# Patient Record
Sex: FEMALE | Race: WHITE | Employment: FULL TIME | URBAN - METROPOLITAN AREA
[De-identification: names, ages, dates, MRNs, and addresses within clinical notes are randomized per-mention and may not be internally consistent; named-entity substitution may affect disease eponyms.]

---

## 2019-03-27 ENCOUNTER — HOSPITAL ENCOUNTER (EMERGENCY)
Age: 54
Discharge: HOME | End: 2019-03-27
Attending: EMERGENCY MEDICINE
Payer: COMMERCIAL

## 2019-03-27 VITALS
OXYGEN SATURATION: 98 % | SYSTOLIC BLOOD PRESSURE: 130 MMHG | TEMPERATURE: 98 F | DIASTOLIC BLOOD PRESSURE: 84 MMHG | RESPIRATION RATE: 16 BRPM | HEART RATE: 92 BPM

## 2019-03-27 DIAGNOSIS — T15.92XA ACUTE FOREIGN BODY OF LEFT EYE, INITIAL ENCOUNTER: Primary | ICD-10-CM

## 2019-03-27 PROCEDURE — 6370000000 HC RX 637 (ALT 250 FOR IP)

## 2019-03-27 PROCEDURE — 99283 EMERGENCY DEPT VISIT LOW MDM: CPT

## 2019-03-27 PROCEDURE — 65205 REMOVE FOREIGN BODY FROM EYE: CPT

## 2019-03-27 RX ORDER — TETRACAINE HYDROCHLORIDE 5 MG/ML
SOLUTION OPHTHALMIC
Status: COMPLETED
Start: 2019-03-27 | End: 2019-03-27

## 2019-03-27 RX ADMIN — FLUORESCEIN SODIUM 1 MG: 1 STRIP OPHTHALMIC at 10:46

## 2019-03-27 RX ADMIN — TETRACAINE HYDROCHLORIDE 2 DROP: 5 SOLUTION OPHTHALMIC at 10:47

## 2021-04-08 ENCOUNTER — HOSPITAL ENCOUNTER (EMERGENCY)
Age: 56
Discharge: HOME | End: 2021-04-08
Payer: COMMERCIAL

## 2021-04-08 VITALS
DIASTOLIC BLOOD PRESSURE: 78 MMHG | SYSTOLIC BLOOD PRESSURE: 133 MMHG | TEMPERATURE: 98.4 F | RESPIRATION RATE: 20 BRPM | HEIGHT: 63 IN | BODY MASS INDEX: 35.44 KG/M2 | OXYGEN SATURATION: 95 % | HEART RATE: 88 BPM | WEIGHT: 200 LBS

## 2021-04-08 DIAGNOSIS — L25.9 CONTACT DERMATITIS, UNSPECIFIED CONTACT DERMATITIS TYPE, UNSPECIFIED TRIGGER: ICD-10-CM

## 2021-04-08 DIAGNOSIS — R21 RASH: Primary | ICD-10-CM

## 2021-04-08 PROCEDURE — 99212 OFFICE O/P EST SF 10 MIN: CPT

## 2021-04-08 RX ORDER — PREDNISONE 10 MG/1
TABLET ORAL
Qty: 20 TABLET | Refills: 0 | Status: SHIPPED | OUTPATIENT
Start: 2021-04-08 | End: 2021-12-06

## 2021-04-08 RX ORDER — CETIRIZINE HYDROCHLORIDE 10 MG/1
10 TABLET ORAL DAILY PRN
Qty: 12 TABLET | Refills: 0 | Status: SHIPPED | OUTPATIENT
Start: 2021-04-08

## 2021-04-08 NOTE — ED PROVIDER NOTES
[address] [facility]  Department of Emergency Medicine   Encounter Note  21   1:06 PM EDT      NAME: Debra Mcduffie  :  1965  MRN:  [id number]    Chief Complaint: Rash (started  2 days ago red rash on right leg neck  back   went to  yesterday  was given shot  zyrtec and cream  not any better)      This is a 57-year-old female the presents to urgent care complaining of a red blotchy itchy type rash to the right leg and upper extremities and also on her trunk as well. She states this started 2 days ago. But denies any difficulty breathing or swallowing. She was given a steroid injection yesterday at another urgent care. She did take a Benadryl last night however she states the symptoms have continued and has been using steroid cream that was given to her as well. On first contact patient she appears to be in no acute distress. Review of Systems  Pertinent positives and negatives are stated within HPI, all other systems reviewed and are negative. Physical Exam  Vitals signs and nursing note reviewed. Constitutional:       Appearance: She is well-developed. HENT:      Head: Normocephalic and atraumatic. Right Ear: Hearing and external ear normal.      Left Ear: Hearing and external ear normal.      Nose: Nose normal.      Mouth/Throat:      Pharynx: Uvula midline. Eyes:      General: Lids are normal.      Conjunctiva/sclera: Conjunctivae normal.      Pupils: Pupils are equal, round, and reactive to light. Neck:      Musculoskeletal: Normal range of motion and neck supple. Cardiovascular:      Rate and Rhythm: Normal rate and regular rhythm. Heart sounds: Normal heart sounds. No murmur. Pulmonary:      Effort: Pulmonary effort is normal.      Breath sounds: Normal breath sounds. Abdominal:      General: Bowel sounds are normal.      Palpations: Abdomen is soft. Abdomen is not rigid. Tenderness: There is no abdominal tenderness.  There is no guarding or rebound. Skin:     General: Skin is warm and dry. Findings: Rash present. No abrasion. Rash is macular. Rash is not purpuric or vesicular. Comments: Blanchable type rash to the arms and legs and trunk. Neurological:      Mental Status: She is alert and oriented to person, place, and time. GCS: GCS eye subscore is 4. GCS verbal subscore is 5. GCS motor subscore is 6. Cranial Nerves: No cranial nerve deficit. Sensory: No sensory deficit. Coordination: Coordination normal.      Gait: Gait normal.         Procedures    MDM  Number of Diagnoses or Management Options  Contact dermatitis, unspecified contact dermatitis type, unspecified trigger  Rash  Diagnosis management comments: Placed on an oral steroid have and given Zyrtec so she can take that during the day. Continue the steroid cream or use calamine lotion to help with the itching. Return if symptoms worsen. --------------------------------------------- PAST HISTORY ---------------------------------------------  Past Medical History:  has a past medical history of Arthritis, Asthma, and Thyroid disease. Past Surgical History:  has a past surgical history that includes  section and bladder suspension. Social History:  reports that she has been smoking. She has never used smokeless tobacco. She reports that she does not drink alcohol or use drugs. Family History: family history is not on file. The patients home medications have been reviewed. Allergies: Eggs [egg white]    -------------------------------------------------- RESULTS -------------------------------------------------  No results found for this visit on 21. No orders to display       ------------------------- NURSING NOTES AND VITALS REVIEWED ---------------------------   The nursing notes within the ED encounter and vital signs as below have been reviewed.    /78   Pulse 88   Temp 98.4 °F (36.9 °C)

## 2021-12-06 ENCOUNTER — HOSPITAL ENCOUNTER (EMERGENCY)
Age: 56
Discharge: HOME | End: 2021-12-06
Payer: COMMERCIAL

## 2021-12-06 ENCOUNTER — APPOINTMENT (OUTPATIENT)
Dept: GENERAL RADIOLOGY | Age: 56
End: 2021-12-06
Payer: COMMERCIAL

## 2021-12-06 VITALS
DIASTOLIC BLOOD PRESSURE: 85 MMHG | TEMPERATURE: 97.5 F | HEIGHT: 63 IN | HEART RATE: 86 BPM | OXYGEN SATURATION: 96 % | WEIGHT: 200 LBS | BODY MASS INDEX: 35.44 KG/M2 | SYSTOLIC BLOOD PRESSURE: 145 MMHG | RESPIRATION RATE: 20 BRPM

## 2021-12-06 DIAGNOSIS — J06.9 ACUTE UPPER RESPIRATORY INFECTION: ICD-10-CM

## 2021-12-06 DIAGNOSIS — J20.9 ACUTE BRONCHITIS, UNSPECIFIED ORGANISM: Primary | ICD-10-CM

## 2021-12-06 PROCEDURE — 71046 X-RAY EXAM CHEST 2 VIEWS: CPT

## 2021-12-06 PROCEDURE — 99211 OFF/OP EST MAY X REQ PHY/QHP: CPT

## 2021-12-06 RX ORDER — BROMPHENIRAMINE MALEATE, PSEUDOEPHEDRINE HYDROCHLORIDE, AND DEXTROMETHORPHAN HYDROBROMIDE 2; 30; 10 MG/5ML; MG/5ML; MG/5ML
10 SYRUP ORAL 3 TIMES DAILY PRN
Qty: 120 ML | Refills: 0 | Status: SHIPPED | OUTPATIENT
Start: 2021-12-06

## 2021-12-06 RX ORDER — PREDNISONE 20 MG/1
TABLET ORAL
Qty: 8 TABLET | Refills: 0 | Status: SHIPPED | OUTPATIENT
Start: 2021-12-06

## 2021-12-06 RX ORDER — ALBUTEROL SULFATE 90 UG/1
2 AEROSOL, METERED RESPIRATORY (INHALATION) 4 TIMES DAILY PRN
Qty: 1 EACH | Refills: 0 | Status: SHIPPED | OUTPATIENT
Start: 2021-12-06

## 2021-12-06 NOTE — ED PROVIDER NOTES
[address] [facility]  Department of Emergency Medicine  UC Encounter Note  21   2:46 PM EST      NAME: Radha Sexton  :  1965  MRN:  [id number]    Chief Complaint: Sinusitis (started  week ago with sinus pressure  chest congestion  went to dr  was on  z pac) and Chest Congestion      This is a 57-year-old female the presents to urgent care complaining of chest congestion and a cough and some sinus pain and pressure and congestion and runny nose for the past week. She states about 6 days ago she was placed on Zithromax and she had taken the 5-day course of the antibiotic and felt a little bit better but still has symptoms. She has not take any other medications to help with the symptoms specifically. On first contact patient she is in no acute distress. Patient is a smoker. Review of Systems  Pertinent positives and negatives are stated within HPI, all other systems reviewed and are negative. Physical Exam  Vitals and nursing note reviewed. Constitutional:       Appearance: She is well-developed. HENT:      Head: Normocephalic and atraumatic. Jaw: There is normal jaw occlusion. Right Ear: Hearing, ear canal and external ear normal. Tympanic membrane is bulging. Left Ear: Hearing, ear canal and external ear normal. Tympanic membrane is bulging. Nose: Congestion and rhinorrhea present. Right Sinus: Frontal sinus tenderness present. Left Sinus: Frontal sinus tenderness present. Mouth/Throat:      Lips: Pink. Mouth: Mucous membranes are moist. No angioedema. Pharynx: Oropharynx is clear. Uvula midline. Comments: Oropharynx is patent. Eyes:      General: Lids are normal.      Conjunctiva/sclera: Conjunctivae normal.      Pupils: Pupils are equal, round, and reactive to light. Cardiovascular:      Rate and Rhythm: Normal rate and regular rhythm. Heart sounds: Normal heart sounds.  No murmur heard.      Pulmonary:      Effort: Pulmonary effort is normal. No respiratory distress. Breath sounds: Normal breath sounds. No wheezing or rales. Abdominal:      General: Bowel sounds are normal.      Palpations: Abdomen is soft. Abdomen is not rigid. Tenderness: There is no abdominal tenderness. There is no guarding or rebound. Musculoskeletal:      Cervical back: Normal range of motion and neck supple. Skin:     General: Skin is warm and dry. Findings: No abrasion or rash. Neurological:      General: No focal deficit present. Mental Status: She is alert and oriented to person, place, and time. GCS: GCS eye subscore is 4. GCS verbal subscore is 5. GCS motor subscore is 6. Cranial Nerves: No cranial nerve deficit. Sensory: No sensory deficit. Coordination: Coordination normal.      Gait: Gait normal.         Procedures    MDM  Number of Diagnoses or Management Options  Acute bronchitis, unspecified organism  Acute upper respiratory infection  Diagnosis management comments: Patient is in no acute distress here. X-ray was reviewed. Patient had just recently finished Zithromax. Recommend she be placed on medications to help with her symptoms. Note for work given.           --------------------------------------------- PAST HISTORY ---------------------------------------------  Past Medical History:  has a past medical history of Arthritis, Asthma, and Thyroid disease. Past Surgical History:  has a past surgical history that includes  section and bladder suspension. Social History:  reports that she has been smoking. She has never used smokeless tobacco. She reports that she does not drink alcohol and does not use drugs. Family History: family history is not on file. The patients home medications have been reviewed.     Allergies: Eggs [egg white]    -------------------------------------------------- RESULTS -------------------------------------------------  No results found for this visit on 12/06/21. XR CHEST (2 VW)   Final Result   No acute process. ------------------------- NURSING NOTES AND VITALS REVIEWED ---------------------------   The nursing notes within the ED encounter and vital signs as below have been reviewed. BP (!) 145/85   Pulse 86   Temp 97.5 °F (36.4 °C) (Infrared)   Resp 20   Ht 5' 3\" (1.6 m)   Wt 200 lb (90.7 kg)   SpO2 96%   BMI 35.43 kg/m²   Oxygen Saturation Interpretation: Normal      ------------------------------------------ PROGRESS NOTES ------------------------------------------   I have spoken with the patient and discussed todays results, in addition to providing specific details for the plan of care and counseling regarding the diagnosis and prognosis. Their questions are answered at this time and they are agreeable with the plan.      --------------------------------- ADDITIONAL PROVIDER NOTES ---------------------------------     This patient is stable for discharge. I have shared the specific conditions for return, as well as the importance of follow-up. * NOTE: This report was transcribed using voice recognition software. Every effort was made to ensure accuracy; however, inadvertent computerized transcription errors may be present.    --------------------------------- IMPRESSION AND DISPOSITION ---------------------------------    IMPRESSION  1. Acute bronchitis, unspecified organism    2.  Acute upper respiratory infection        DISPOSITION  Disposition: Discharge to home  Patient condition is good       Cassandra Raymond, PA-C  12/06/21 8874

## 2023-10-24 ENCOUNTER — APPOINTMENT (OUTPATIENT)
Dept: CT IMAGING | Age: 58
End: 2023-10-24
Payer: COMMERCIAL

## 2023-10-24 ENCOUNTER — HOSPITAL ENCOUNTER (EMERGENCY)
Age: 58
Discharge: HOME | End: 2023-10-24
Attending: EMERGENCY MEDICINE
Payer: COMMERCIAL

## 2023-10-24 VITALS
TEMPERATURE: 97.9 F | WEIGHT: 227 LBS | HEART RATE: 92 BPM | DIASTOLIC BLOOD PRESSURE: 97 MMHG | BODY MASS INDEX: 40.22 KG/M2 | SYSTOLIC BLOOD PRESSURE: 154 MMHG | HEIGHT: 63 IN | RESPIRATION RATE: 14 BRPM | OXYGEN SATURATION: 95 %

## 2023-10-24 DIAGNOSIS — R40.4 TRANSIENT ALTERATION OF AWARENESS: Primary | ICD-10-CM

## 2023-10-24 DIAGNOSIS — R73.9 HYPERGLYCEMIA: ICD-10-CM

## 2023-10-24 DIAGNOSIS — R42 DIZZINESS: ICD-10-CM

## 2023-10-24 LAB
ALBUMIN SERPL-MCNC: 4.1 G/DL (ref 3.5–5.2)
ALP SERPL-CCNC: 107 U/L (ref 35–104)
ALT SERPL-CCNC: 31 U/L (ref 0–32)
AMPHET UR QL SCN: POSITIVE
ANION GAP SERPL CALCULATED.3IONS-SCNC: 9 MMOL/L (ref 7–16)
APAP SERPL-MCNC: <5 UG/ML (ref 10–30)
AST SERPL-CCNC: 17 U/L (ref 0–31)
BARBITURATES UR QL SCN: NEGATIVE
BASOPHILS # BLD: 0.07 K/UL (ref 0–0.2)
BASOPHILS NFR BLD: 1 % (ref 0–2)
BENZODIAZ UR QL: NEGATIVE
BILIRUB DIRECT SERPL-MCNC: <0.2 MG/DL (ref 0–0.3)
BILIRUB INDIRECT SERPL-MCNC: ABNORMAL MG/DL (ref 0–1)
BILIRUB SERPL-MCNC: 0.3 MG/DL (ref 0–1.2)
BILIRUB UR QL STRIP: NEGATIVE
BUN SERPL-MCNC: 17 MG/DL (ref 6–20)
BUPRENORPHINE UR QL: NEGATIVE
CALCIUM SERPL-MCNC: 8.9 MG/DL (ref 8.6–10.2)
CANNABINOIDS UR QL SCN: NEGATIVE
CHLORIDE SERPL-SCNC: 100 MMOL/L (ref 98–107)
CLARITY UR: CLEAR
CO2 SERPL-SCNC: 24 MMOL/L (ref 22–29)
COCAINE UR QL SCN: NEGATIVE
COLOR UR: YELLOW
COMMENT: ABNORMAL
CREAT SERPL-MCNC: 0.8 MG/DL (ref 0.5–1)
EOSINOPHIL # BLD: 0.33 K/UL (ref 0.05–0.5)
EOSINOPHILS RELATIVE PERCENT: 3 % (ref 0–6)
ERYTHROCYTE [DISTWIDTH] IN BLOOD BY AUTOMATED COUNT: 15 % (ref 11.5–15)
ETHANOLAMINE SERPL-MCNC: <10 MG/DL
FENTANYL UR QL: NEGATIVE
GFR SERPL CREATININE-BSD FRML MDRD: >60 ML/MIN/1.73M2
GLUCOSE SERPL-MCNC: 327 MG/DL (ref 74–99)
GLUCOSE UR STRIP-MCNC: >=1000 MG/DL
HCT VFR BLD AUTO: 39.8 % (ref 34–48)
HGB BLD-MCNC: 12.7 G/DL (ref 11.5–15.5)
HGB UR QL STRIP.AUTO: NEGATIVE
IMM GRANULOCYTES # BLD AUTO: 0.04 K/UL (ref 0–0.58)
IMM GRANULOCYTES NFR BLD: 0 % (ref 0–5)
KETONES UR STRIP-MCNC: NEGATIVE MG/DL
LEUKOCYTE ESTERASE UR QL STRIP: NEGATIVE
LYMPHOCYTES NFR BLD: 3.59 K/UL (ref 1.5–4)
LYMPHOCYTES RELATIVE PERCENT: 32 % (ref 20–42)
MAGNESIUM SERPL-MCNC: 2.1 MG/DL (ref 1.6–2.6)
MCH RBC QN AUTO: 27.9 PG (ref 26–35)
MCHC RBC AUTO-ENTMCNC: 31.9 G/DL (ref 32–34.5)
MCV RBC AUTO: 87.3 FL (ref 80–99.9)
METHADONE UR QL: NEGATIVE
MONOCYTES NFR BLD: 0.61 K/UL (ref 0.1–0.95)
MONOCYTES NFR BLD: 6 % (ref 2–12)
NEUTROPHILS NFR BLD: 58 % (ref 43–80)
NEUTS SEG NFR BLD: 6.48 K/UL (ref 1.8–7.3)
NITRITE UR QL STRIP: NEGATIVE
OPIATES UR QL SCN: NEGATIVE
OXYCODONE UR QL SCN: NEGATIVE
PCP UR QL SCN: NEGATIVE
PH UR STRIP: 5.5 [PH] (ref 5–9)
PLATELET # BLD AUTO: 369 K/UL (ref 130–450)
PMV BLD AUTO: 9.1 FL (ref 7–12)
POTASSIUM SERPL-SCNC: 4.2 MMOL/L (ref 3.5–5)
PROT SERPL-MCNC: 6.3 G/DL (ref 6.4–8.3)
PROT UR STRIP-MCNC: NEGATIVE MG/DL
RBC # BLD AUTO: 4.56 M/UL (ref 3.5–5.5)
SALICYLATES SERPL-MCNC: <0.3 MG/DL (ref 0–30)
SODIUM SERPL-SCNC: 133 MMOL/L (ref 132–146)
SP GR UR STRIP: 1.01 (ref 1–1.03)
TEST INFORMATION: ABNORMAL
TOXIC TRICYCLIC SC,BLOOD: NEGATIVE
TROPONIN I SERPL HS-MCNC: 10 NG/L (ref 0–9)
TROPONIN I SERPL HS-MCNC: 10 NG/L (ref 0–9)
TSH SERPL DL<=0.05 MIU/L-ACNC: 16.41 UIU/ML (ref 0.27–4.2)
UROBILINOGEN UR STRIP-ACNC: 0.2 EU/DL (ref 0–1)
WBC OTHER # BLD: 11.1 K/UL (ref 4.5–11.5)

## 2023-10-24 PROCEDURE — 85025 COMPLETE CBC W/AUTO DIFF WBC: CPT

## 2023-10-24 PROCEDURE — 6360000004 HC RX CONTRAST MEDICATION: Performed by: RADIOLOGY

## 2023-10-24 PROCEDURE — 80179 DRUG ASSAY SALICYLATE: CPT

## 2023-10-24 PROCEDURE — 96360 HYDRATION IV INFUSION INIT: CPT

## 2023-10-24 PROCEDURE — 2580000003 HC RX 258: Performed by: EMERGENCY MEDICINE

## 2023-10-24 PROCEDURE — 80053 COMPREHEN METABOLIC PANEL: CPT

## 2023-10-24 PROCEDURE — 82248 BILIRUBIN DIRECT: CPT

## 2023-10-24 PROCEDURE — 81003 URINALYSIS AUTO W/O SCOPE: CPT

## 2023-10-24 PROCEDURE — 99285 EMERGENCY DEPT VISIT HI MDM: CPT

## 2023-10-24 PROCEDURE — 93005 ELECTROCARDIOGRAM TRACING: CPT | Performed by: EMERGENCY MEDICINE

## 2023-10-24 PROCEDURE — 83735 ASSAY OF MAGNESIUM: CPT

## 2023-10-24 PROCEDURE — 80143 DRUG ASSAY ACETAMINOPHEN: CPT

## 2023-10-24 PROCEDURE — G0480 DRUG TEST DEF 1-7 CLASSES: HCPCS

## 2023-10-24 PROCEDURE — 96361 HYDRATE IV INFUSION ADD-ON: CPT

## 2023-10-24 PROCEDURE — 70496 CT ANGIOGRAPHY HEAD: CPT

## 2023-10-24 PROCEDURE — 84484 ASSAY OF TROPONIN QUANT: CPT

## 2023-10-24 PROCEDURE — 84443 ASSAY THYROID STIM HORMONE: CPT

## 2023-10-24 PROCEDURE — 80307 DRUG TEST PRSMV CHEM ANLYZR: CPT

## 2023-10-24 PROCEDURE — 70498 CT ANGIOGRAPHY NECK: CPT

## 2023-10-24 RX ORDER — 0.9 % SODIUM CHLORIDE 0.9 %
1000 INTRAVENOUS SOLUTION INTRAVENOUS ONCE
Status: COMPLETED | OUTPATIENT
Start: 2023-10-24 | End: 2023-10-24

## 2023-10-24 RX ADMIN — SODIUM CHLORIDE 1000 ML: 9 INJECTION, SOLUTION INTRAVENOUS at 14:33

## 2023-10-24 RX ADMIN — IOPAMIDOL 75 ML: 755 INJECTION, SOLUTION INTRAVENOUS at 14:46

## 2023-10-24 ASSESSMENT — ENCOUNTER SYMPTOMS
ABDOMINAL PAIN: 0
BACK PAIN: 0
COLOR CHANGE: 0
CHOKING: 0
SHORTNESS OF BREATH: 0

## 2023-10-24 ASSESSMENT — LIFESTYLE VARIABLES
HOW MANY STANDARD DRINKS CONTAINING ALCOHOL DO YOU HAVE ON A TYPICAL DAY: 1 OR 2
HOW OFTEN DO YOU HAVE A DRINK CONTAINING ALCOHOL: MONTHLY OR LESS

## 2023-10-24 NOTE — ED NOTES
Orthostatic blood pressures documented.  Patient stated she felt \"a little dizzy\"     Bhanu Hartmann, [address]  10/24/23 6806

## 2023-10-26 LAB
EKG ATRIAL RATE: 87 BPM
EKG P AXIS: 60 DEGREES
EKG P-R INTERVAL: 164 MS
EKG Q-T INTERVAL: 370 MS
EKG QRS DURATION: 88 MS
EKG QTC CALCULATION (BAZETT): 445 MS
EKG R AXIS: -30 DEGREES
EKG T AXIS: 50 DEGREES
EKG VENTRICULAR RATE: 87 BPM

## 2023-10-26 PROCEDURE — 93010 ELECTROCARDIOGRAM REPORT: CPT | Performed by: INTERNAL MEDICINE

## 2024-07-12 ENCOUNTER — HOSPITAL ENCOUNTER (OUTPATIENT)
Age: 59
Discharge: HOME | End: 2024-07-12
Payer: COMMERCIAL

## 2024-07-12 LAB
ALBUMIN SERPL-MCNC: 4.2 G/DL (ref 3.5–5.2)
ALP SERPL-CCNC: 97 U/L (ref 35–104)
ALT SERPL-CCNC: 17 U/L (ref 0–32)
AST SERPL-CCNC: 13 U/L (ref 0–31)
BASOPHILS # BLD: 0.08 K/UL (ref 0–0.2)
BASOPHILS NFR BLD: 1 % (ref 0–2)
BILIRUB DIRECT SERPL-MCNC: <0.2 MG/DL (ref 0–0.3)
BILIRUB INDIRECT SERPL-MCNC: NORMAL MG/DL (ref 0–1)
BILIRUB SERPL-MCNC: 0.2 MG/DL (ref 0–1.2)
EOSINOPHIL # BLD: 0.08 K/UL (ref 0.05–0.5)
EOSINOPHILS RELATIVE PERCENT: 1 % (ref 0–6)
ERYTHROCYTE [DISTWIDTH] IN BLOOD BY AUTOMATED COUNT: 17.8 % (ref 11.5–15)
GGT SERPL-CCNC: 24 U/L (ref 6–42)
HCT VFR BLD AUTO: 43.5 % (ref 34–48)
HGB BLD-MCNC: 13.4 G/DL (ref 11.5–15.5)
IMM GRANULOCYTES # BLD AUTO: 0.06 K/UL (ref 0–0.58)
IMM GRANULOCYTES NFR BLD: 1 % (ref 0–5)
LYMPHOCYTES NFR BLD: 1.87 K/UL (ref 1.5–4)
LYMPHOCYTES RELATIVE PERCENT: 15 % (ref 20–42)
MCH RBC QN AUTO: 26.7 PG (ref 26–35)
MCHC RBC AUTO-ENTMCNC: 30.8 G/DL (ref 32–34.5)
MCV RBC AUTO: 86.7 FL (ref 80–99.9)
MONOCYTES NFR BLD: 0.58 K/UL (ref 0.1–0.95)
MONOCYTES NFR BLD: 5 % (ref 2–12)
NEUTROPHILS NFR BLD: 79 % (ref 43–80)
NEUTS SEG NFR BLD: 10.07 K/UL (ref 1.8–7.3)
PLATELET # BLD AUTO: 373 K/UL (ref 130–450)
PMV BLD AUTO: 8.9 FL (ref 7–12)
PROT SERPL-MCNC: 7 G/DL (ref 6.4–8.3)
RBC # BLD AUTO: 5.02 M/UL (ref 3.5–5.5)
WBC OTHER # BLD: 12.7 K/UL (ref 4.5–11.5)

## 2024-07-12 PROCEDURE — 85025 COMPLETE CBC W/AUTO DIFF WBC: CPT

## 2024-07-12 PROCEDURE — 86481 TB AG RESPONSE T-CELL SUSP: CPT

## 2024-07-12 PROCEDURE — 80076 HEPATIC FUNCTION PANEL: CPT

## 2024-07-12 PROCEDURE — 82977 ASSAY OF GGT: CPT

## 2024-07-12 PROCEDURE — 36415 COLL VENOUS BLD VENIPUNCTURE: CPT

## 2024-07-15 LAB — T SPOT TB TEST: NORMAL

## 2024-07-24 ENCOUNTER — HOSPITAL ENCOUNTER (OUTPATIENT)
Age: 59
Discharge: HOME | End: 2024-07-24
Payer: COMMERCIAL

## 2024-07-24 LAB
HAV IGM SERPL QL IA: NONREACTIVE
HBV CORE IGM SERPL QL IA: NONREACTIVE
HBV SURFACE AG SERPL QL IA: NONREACTIVE
HCV AB SERPL QL IA: NONREACTIVE

## 2024-07-24 PROCEDURE — 80074 ACUTE HEPATITIS PANEL: CPT

## 2024-07-24 PROCEDURE — 36415 COLL VENOUS BLD VENIPUNCTURE: CPT

## 2024-08-31 ENCOUNTER — HOSPITAL ENCOUNTER (INPATIENT)
Age: 59
LOS: 3 days | Discharge: HOME HEALTH | DRG: 244 | End: 2024-09-04
Attending: EMERGENCY MEDICINE | Admitting: INTERNAL MEDICINE
Payer: COMMERCIAL

## 2024-08-31 ENCOUNTER — APPOINTMENT (OUTPATIENT)
Dept: CT IMAGING | Age: 59
DRG: 244 | End: 2024-08-31
Payer: COMMERCIAL

## 2024-08-31 ENCOUNTER — APPOINTMENT (OUTPATIENT)
Dept: GENERAL RADIOLOGY | Age: 59
DRG: 244 | End: 2024-08-31
Payer: COMMERCIAL

## 2024-08-31 DIAGNOSIS — K57.20 COLONIC DIVERTICULAR ABSCESS: ICD-10-CM

## 2024-08-31 DIAGNOSIS — L02.91 ABSCESS: ICD-10-CM

## 2024-08-31 DIAGNOSIS — K57.32 DIVERTICULITIS OF COLON: Primary | ICD-10-CM

## 2024-08-31 DIAGNOSIS — R10.31 RIGHT LOWER QUADRANT ABDOMINAL PAIN: ICD-10-CM

## 2024-08-31 LAB
ALBUMIN SERPL-MCNC: 3.1 G/DL (ref 3.5–5.2)
ALP SERPL-CCNC: 90 U/L (ref 35–104)
ALT SERPL-CCNC: 19 U/L (ref 0–32)
AMYLASE SERPL-CCNC: 17 U/L (ref 20–100)
ANION GAP SERPL CALCULATED.3IONS-SCNC: 13 MMOL/L (ref 7–16)
AST SERPL-CCNC: 16 U/L (ref 0–31)
BILIRUB SERPL-MCNC: 0.3 MG/DL (ref 0–1.2)
BUN SERPL-MCNC: 10 MG/DL (ref 6–23)
CALCIUM SERPL-MCNC: 9.1 MG/DL (ref 8.6–10.2)
CHLORIDE SERPL-SCNC: 100 MMOL/L (ref 98–107)
CO2 SERPL-SCNC: 23 MMOL/L (ref 22–29)
CREAT SERPL-MCNC: 0.8 MG/DL (ref 0.5–1)
ERYTHROCYTE [DISTWIDTH] IN BLOOD BY AUTOMATED COUNT: 16.5 % (ref 11.5–15)
GFR, ESTIMATED: 81 ML/MIN/1.73M2
GLUCOSE SERPL-MCNC: 152 MG/DL (ref 74–99)
HCT VFR BLD AUTO: 40.4 % (ref 34–48)
HGB BLD-MCNC: 12.8 G/DL (ref 11.5–15.5)
LACTATE BLDV-SCNC: 1.2 MMOL/L (ref 0.5–2.2)
LIPASE SERPL-CCNC: 14 U/L (ref 13–60)
MAGNESIUM SERPL-MCNC: 2.4 MG/DL (ref 1.6–2.6)
MCH RBC QN AUTO: 27.6 PG (ref 26–35)
MCHC RBC AUTO-ENTMCNC: 31.7 G/DL (ref 32–34.5)
MCV RBC AUTO: 87.1 FL (ref 80–99.9)
PLATELET # BLD AUTO: 305 K/UL (ref 130–450)
PMV BLD AUTO: 8.9 FL (ref 7–12)
POTASSIUM SERPL-SCNC: 3.8 MMOL/L (ref 3.5–5)
PROT SERPL-MCNC: 6.5 G/DL (ref 6.4–8.3)
RBC # BLD AUTO: 4.64 M/UL (ref 3.5–5.5)
SODIUM SERPL-SCNC: 136 MMOL/L (ref 132–146)
WBC OTHER # BLD: 7.8 K/UL (ref 4.5–11.5)

## 2024-08-31 PROCEDURE — 81001 URINALYSIS AUTO W/SCOPE: CPT

## 2024-08-31 PROCEDURE — 2580000003 HC RX 258: Performed by: EMERGENCY MEDICINE

## 2024-08-31 PROCEDURE — 83735 ASSAY OF MAGNESIUM: CPT

## 2024-08-31 PROCEDURE — 96365 THER/PROPH/DIAG IV INF INIT: CPT

## 2024-08-31 PROCEDURE — 80053 COMPREHEN METABOLIC PANEL: CPT

## 2024-08-31 PROCEDURE — 85027 COMPLETE CBC AUTOMATED: CPT

## 2024-08-31 PROCEDURE — 74176 CT ABD & PELVIS W/O CONTRAST: CPT

## 2024-08-31 PROCEDURE — 99285 EMERGENCY DEPT VISIT HI MDM: CPT

## 2024-08-31 PROCEDURE — 71045 X-RAY EXAM CHEST 1 VIEW: CPT

## 2024-08-31 PROCEDURE — 96375 TX/PRO/DX INJ NEW DRUG ADDON: CPT

## 2024-08-31 PROCEDURE — 83605 ASSAY OF LACTIC ACID: CPT

## 2024-08-31 PROCEDURE — 6360000002 HC RX W HCPCS: Performed by: EMERGENCY MEDICINE

## 2024-08-31 PROCEDURE — 83690 ASSAY OF LIPASE: CPT

## 2024-08-31 PROCEDURE — 82150 ASSAY OF AMYLASE: CPT

## 2024-08-31 RX ORDER — FLUTICASONE PROPIONATE AND SALMETEROL XINAFOATE 45; 21 UG/1; UG/1
2 AEROSOL, METERED RESPIRATORY (INHALATION) 2 TIMES DAILY
COMMUNITY
End: 2024-09-01

## 2024-08-31 RX ORDER — KETOROLAC TROMETHAMINE 30 MG/ML
15 INJECTION, SOLUTION INTRAMUSCULAR; INTRAVENOUS ONCE
Status: COMPLETED | OUTPATIENT
Start: 2024-08-31 | End: 2024-08-31

## 2024-08-31 RX ORDER — ONDANSETRON 2 MG/ML
4 INJECTION INTRAMUSCULAR; INTRAVENOUS ONCE
Status: COMPLETED | OUTPATIENT
Start: 2024-08-31 | End: 2024-08-31

## 2024-08-31 RX ORDER — MORPHINE SULFATE 2 MG/ML
2 INJECTION, SOLUTION INTRAMUSCULAR; INTRAVENOUS ONCE
Status: COMPLETED | OUTPATIENT
Start: 2024-08-31 | End: 2024-08-31

## 2024-08-31 RX ORDER — 0.9 % SODIUM CHLORIDE 0.9 %
500 INTRAVENOUS SOLUTION INTRAVENOUS ONCE
Status: COMPLETED | OUTPATIENT
Start: 2024-08-31 | End: 2024-08-31

## 2024-08-31 RX ADMIN — PIPERACILLIN AND TAZOBACTAM 3375 MG: 3; .375 INJECTION, POWDER, LYOPHILIZED, FOR SOLUTION INTRAVENOUS at 23:42

## 2024-08-31 RX ADMIN — ONDANSETRON 4 MG: 2 INJECTION INTRAMUSCULAR; INTRAVENOUS at 22:44

## 2024-08-31 RX ADMIN — KETOROLAC TROMETHAMINE 15 MG: 30 INJECTION, SOLUTION INTRAMUSCULAR at 23:39

## 2024-08-31 RX ADMIN — HYDROMORPHONE HYDROCHLORIDE 0.5 MG: 1 INJECTION, SOLUTION INTRAMUSCULAR; INTRAVENOUS; SUBCUTANEOUS at 23:40

## 2024-08-31 RX ADMIN — MORPHINE SULFATE 2 MG: 2 INJECTION, SOLUTION INTRAMUSCULAR; INTRAVENOUS at 23:01

## 2024-08-31 RX ADMIN — SODIUM CHLORIDE 500 ML: 9 INJECTION, SOLUTION INTRAVENOUS at 22:11

## 2024-08-31 ASSESSMENT — LIFESTYLE VARIABLES
HOW MANY STANDARD DRINKS CONTAINING ALCOHOL DO YOU HAVE ON A TYPICAL DAY: PATIENT DOES NOT DRINK
HOW OFTEN DO YOU HAVE A DRINK CONTAINING ALCOHOL: NEVER

## 2024-09-01 ENCOUNTER — APPOINTMENT (OUTPATIENT)
Dept: CT IMAGING | Age: 59
DRG: 244 | End: 2024-09-01
Payer: COMMERCIAL

## 2024-09-01 PROBLEM — K57.20 COLONIC DIVERTICULAR ABSCESS: Status: ACTIVE | Noted: 2024-09-01

## 2024-09-01 LAB
BACTERIA URNS QL MICRO: ABNORMAL
BILIRUB UR QL STRIP: NEGATIVE
CLARITY UR: CLEAR
COLOR UR: YELLOW
EPI CELLS #/AREA URNS HPF: ABNORMAL /HPF
GLUCOSE BLD-MCNC: 113 MG/DL (ref 74–99)
GLUCOSE BLD-MCNC: 122 MG/DL (ref 74–99)
GLUCOSE UR STRIP-MCNC: >=1000 MG/DL
HGB UR QL STRIP.AUTO: NEGATIVE
INR PPP: 1.3
KETONES UR STRIP-MCNC: NEGATIVE MG/DL
LEUKOCYTE ESTERASE UR QL STRIP: NEGATIVE
NITRITE UR QL STRIP: NEGATIVE
PH UR STRIP: 6.5 [PH] (ref 5–9)
PROT UR STRIP-MCNC: NEGATIVE MG/DL
PROTHROMBIN TIME: 14.7 SEC (ref 9.3–12.4)
RBC #/AREA URNS HPF: ABNORMAL /HPF
SP GR UR STRIP: 1.02 (ref 1–1.03)
UROBILINOGEN UR STRIP-ACNC: 0.2 EU/DL (ref 0–1)
WBC #/AREA URNS HPF: ABNORMAL /HPF

## 2024-09-01 PROCEDURE — 85610 PROTHROMBIN TIME: CPT

## 2024-09-01 PROCEDURE — 2700000000 HC OXYGEN THERAPY PER DAY

## 2024-09-01 PROCEDURE — 99222 1ST HOSP IP/OBS MODERATE 55: CPT | Performed by: STUDENT IN AN ORGANIZED HEALTH CARE EDUCATION/TRAINING PROGRAM

## 2024-09-01 PROCEDURE — 2580000003 HC RX 258: Performed by: STUDENT IN AN ORGANIZED HEALTH CARE EDUCATION/TRAINING PROGRAM

## 2024-09-01 PROCEDURE — 94664 DEMO&/EVAL PT USE INHALER: CPT

## 2024-09-01 PROCEDURE — 36415 COLL VENOUS BLD VENIPUNCTURE: CPT

## 2024-09-01 PROCEDURE — C1729 CATH, DRAINAGE: HCPCS

## 2024-09-01 PROCEDURE — 2580000003 HC RX 258: Performed by: INTERNAL MEDICINE

## 2024-09-01 PROCEDURE — 6370000000 HC RX 637 (ALT 250 FOR IP): Performed by: INTERNAL MEDICINE

## 2024-09-01 PROCEDURE — 87070 CULTURE OTHR SPECIMN AEROBIC: CPT

## 2024-09-01 PROCEDURE — 82962 GLUCOSE BLOOD TEST: CPT

## 2024-09-01 PROCEDURE — 96375 TX/PRO/DX INJ NEW DRUG ADDON: CPT

## 2024-09-01 PROCEDURE — 87077 CULTURE AEROBIC IDENTIFY: CPT

## 2024-09-01 PROCEDURE — 94640 AIRWAY INHALATION TREATMENT: CPT

## 2024-09-01 PROCEDURE — 87102 FUNGUS ISOLATION CULTURE: CPT

## 2024-09-01 PROCEDURE — 6360000002 HC RX W HCPCS: Performed by: EMERGENCY MEDICINE

## 2024-09-01 PROCEDURE — 6360000002 HC RX W HCPCS: Performed by: INTERNAL MEDICINE

## 2024-09-01 PROCEDURE — 6360000002 HC RX W HCPCS: Performed by: STUDENT IN AN ORGANIZED HEALTH CARE EDUCATION/TRAINING PROGRAM

## 2024-09-01 PROCEDURE — 2580000003 HC RX 258: Performed by: RADIOLOGY

## 2024-09-01 PROCEDURE — 87075 CULTR BACTERIA EXCEPT BLOOD: CPT

## 2024-09-01 PROCEDURE — 75989 ABSCESS DRAINAGE UNDER X-RAY: CPT

## 2024-09-01 PROCEDURE — 0W9J30Z DRAINAGE OF PELVIC CAVITY WITH DRAINAGE DEVICE, PERCUTANEOUS APPROACH: ICD-10-PCS | Performed by: STUDENT IN AN ORGANIZED HEALTH CARE EDUCATION/TRAINING PROGRAM

## 2024-09-01 PROCEDURE — 2060000000 HC ICU INTERMEDIATE R&B

## 2024-09-01 PROCEDURE — 2580000003 HC RX 258: Performed by: EMERGENCY MEDICINE

## 2024-09-01 PROCEDURE — 87040 BLOOD CULTURE FOR BACTERIA: CPT

## 2024-09-01 PROCEDURE — 87205 SMEAR GRAM STAIN: CPT

## 2024-09-01 PROCEDURE — 6360000002 HC RX W HCPCS: Performed by: RADIOLOGY

## 2024-09-01 RX ORDER — ACETAMINOPHEN 650 MG/1
650 SUPPOSITORY RECTAL EVERY 6 HOURS PRN
Status: DISCONTINUED | OUTPATIENT
Start: 2024-09-01 | End: 2024-09-04 | Stop reason: HOSPADM

## 2024-09-01 RX ORDER — INSULIN LISPRO 100 [IU]/ML
0-4 INJECTION, SOLUTION INTRAVENOUS; SUBCUTANEOUS NIGHTLY
Status: DISCONTINUED | OUTPATIENT
Start: 2024-09-01 | End: 2024-09-04 | Stop reason: HOSPADM

## 2024-09-01 RX ORDER — DULOXETIN HYDROCHLORIDE 30 MG/1
60 CAPSULE, DELAYED RELEASE ORAL DAILY
Status: DISCONTINUED | OUTPATIENT
Start: 2024-09-01 | End: 2024-09-04 | Stop reason: HOSPADM

## 2024-09-01 RX ORDER — SODIUM CHLORIDE 0.9 % (FLUSH) 0.9 %
5-40 SYRINGE (ML) INJECTION PRN
Status: DISCONTINUED | OUTPATIENT
Start: 2024-09-01 | End: 2024-09-04 | Stop reason: HOSPADM

## 2024-09-01 RX ORDER — ONDANSETRON 2 MG/ML
4 INJECTION INTRAMUSCULAR; INTRAVENOUS EVERY 6 HOURS PRN
Status: DISCONTINUED | OUTPATIENT
Start: 2024-09-01 | End: 2024-09-04 | Stop reason: HOSPADM

## 2024-09-01 RX ORDER — INSULIN LISPRO 100 [IU]/ML
0-8 INJECTION, SOLUTION INTRAVENOUS; SUBCUTANEOUS
Status: DISCONTINUED | OUTPATIENT
Start: 2024-09-01 | End: 2024-09-04 | Stop reason: HOSPADM

## 2024-09-01 RX ORDER — ONDANSETRON 4 MG/1
4 TABLET, ORALLY DISINTEGRATING ORAL EVERY 8 HOURS PRN
Status: DISCONTINUED | OUTPATIENT
Start: 2024-09-01 | End: 2024-09-04 | Stop reason: HOSPADM

## 2024-09-01 RX ORDER — SODIUM CHLORIDE 0.9 % (FLUSH) 0.9 %
5-40 SYRINGE (ML) INJECTION EVERY 12 HOURS SCHEDULED
Status: DISCONTINUED | OUTPATIENT
Start: 2024-09-01 | End: 2024-09-04 | Stop reason: HOSPADM

## 2024-09-01 RX ORDER — ACETAMINOPHEN 325 MG/1
650 TABLET ORAL EVERY 6 HOURS PRN
Status: DISCONTINUED | OUTPATIENT
Start: 2024-09-01 | End: 2024-09-04 | Stop reason: HOSPADM

## 2024-09-01 RX ORDER — LEVOTHYROXINE SODIUM 88 UG/1
88 TABLET ORAL DAILY
Status: DISCONTINUED | OUTPATIENT
Start: 2024-09-02 | End: 2024-09-04 | Stop reason: HOSPADM

## 2024-09-01 RX ORDER — SODIUM CHLORIDE 9 MG/ML
INJECTION, SOLUTION INTRAVENOUS PRN
Status: DISCONTINUED | OUTPATIENT
Start: 2024-09-01 | End: 2024-09-04 | Stop reason: HOSPADM

## 2024-09-01 RX ORDER — CLINDAMYCIN PHOSPHATE 10 UG/ML
LOTION TOPICAL 2 TIMES DAILY
COMMUNITY

## 2024-09-01 RX ORDER — PREDNISONE 20 MG/1
40 TABLET ORAL DAILY PRN
Status: ON HOLD | COMMUNITY
End: 2024-09-03 | Stop reason: HOSPADM

## 2024-09-01 RX ORDER — MONTELUKAST SODIUM 10 MG/1
10 TABLET ORAL NIGHTLY
Status: DISCONTINUED | OUTPATIENT
Start: 2024-09-01 | End: 2024-09-04 | Stop reason: HOSPADM

## 2024-09-01 RX ORDER — FENTANYL CITRATE 50 UG/ML
INJECTION, SOLUTION INTRAMUSCULAR; INTRAVENOUS PRN
Status: COMPLETED | OUTPATIENT
Start: 2024-09-01 | End: 2024-09-01

## 2024-09-01 RX ORDER — DIPHENHYDRAMINE HYDROCHLORIDE 50 MG/ML
INJECTION INTRAMUSCULAR; INTRAVENOUS PRN
Status: COMPLETED | OUTPATIENT
Start: 2024-09-01 | End: 2024-09-01

## 2024-09-01 RX ORDER — MORPHINE SULFATE 2 MG/ML
2 INJECTION, SOLUTION INTRAMUSCULAR; INTRAVENOUS EVERY 4 HOURS PRN
Status: DISCONTINUED | OUTPATIENT
Start: 2024-09-01 | End: 2024-09-04 | Stop reason: HOSPADM

## 2024-09-01 RX ORDER — FENTANYL CITRATE 50 UG/ML
25 INJECTION, SOLUTION INTRAMUSCULAR; INTRAVENOUS ONCE
Status: COMPLETED | OUTPATIENT
Start: 2024-09-01 | End: 2024-09-01

## 2024-09-01 RX ORDER — OMEGA-3 FATTY ACIDS/FISH OIL 300-1000MG
1 CAPSULE ORAL 2 TIMES DAILY
COMMUNITY

## 2024-09-01 RX ORDER — FLUOCINONIDE 0.5 MG/G
CREAM TOPICAL 2 TIMES DAILY PRN
COMMUNITY

## 2024-09-01 RX ORDER — DESONIDE 0.5 MG/G
CREAM TOPICAL 2 TIMES DAILY
COMMUNITY

## 2024-09-01 RX ORDER — SODIUM CHLORIDE 9 MG/ML
INJECTION, SOLUTION INTRAVENOUS CONTINUOUS
Status: ACTIVE | OUTPATIENT
Start: 2024-09-01 | End: 2024-09-03

## 2024-09-01 RX ORDER — ALBUTEROL SULFATE 0.83 MG/ML
2.5 SOLUTION RESPIRATORY (INHALATION) EVERY 6 HOURS PRN
Status: DISCONTINUED | OUTPATIENT
Start: 2024-09-01 | End: 2024-09-04 | Stop reason: HOSPADM

## 2024-09-01 RX ORDER — LORAZEPAM 1 MG/1
1 TABLET ORAL NIGHTLY PRN
COMMUNITY

## 2024-09-01 RX ORDER — 0.9 % SODIUM CHLORIDE 0.9 %
1000 INTRAVENOUS SOLUTION INTRAVENOUS ONCE
Status: COMPLETED | OUTPATIENT
Start: 2024-09-01 | End: 2024-09-01

## 2024-09-01 RX ORDER — MAGNESIUM SULFATE IN WATER 40 MG/ML
2000 INJECTION, SOLUTION INTRAVENOUS PRN
Status: DISCONTINUED | OUTPATIENT
Start: 2024-09-01 | End: 2024-09-04 | Stop reason: HOSPADM

## 2024-09-01 RX ORDER — POTASSIUM CHLORIDE 1500 MG/1
40 TABLET, EXTENDED RELEASE ORAL PRN
Status: DISCONTINUED | OUTPATIENT
Start: 2024-09-01 | End: 2024-09-04 | Stop reason: HOSPADM

## 2024-09-01 RX ORDER — POTASSIUM CHLORIDE 7.45 MG/ML
10 INJECTION INTRAVENOUS PRN
Status: DISCONTINUED | OUTPATIENT
Start: 2024-09-01 | End: 2024-09-04 | Stop reason: HOSPADM

## 2024-09-01 RX ORDER — MIDAZOLAM HYDROCHLORIDE 2 MG/2ML
INJECTION, SOLUTION INTRAMUSCULAR; INTRAVENOUS PRN
Status: COMPLETED | OUTPATIENT
Start: 2024-09-01 | End: 2024-09-01

## 2024-09-01 RX ORDER — FLUTICASONE PROPIONATE AND SALMETEROL 250; 50 UG/1; UG/1
1 POWDER RESPIRATORY (INHALATION) EVERY 12 HOURS
COMMUNITY

## 2024-09-01 RX ORDER — IBUPROFEN 800 MG/1
800 TABLET, FILM COATED ORAL EVERY 6 HOURS PRN
COMMUNITY

## 2024-09-01 RX ORDER — POLYETHYLENE GLYCOL 3350 17 G/17G
17 POWDER, FOR SOLUTION ORAL DAILY PRN
Status: DISCONTINUED | OUTPATIENT
Start: 2024-09-01 | End: 2024-09-02

## 2024-09-01 RX ADMIN — SODIUM CHLORIDE, PRESERVATIVE FREE 10 ML: 5 INJECTION INTRAVENOUS at 21:41

## 2024-09-01 RX ADMIN — DIPHENHYDRAMINE HYDROCHLORIDE 25 MG: 50 INJECTION, SOLUTION INTRAMUSCULAR; INTRAVENOUS at 14:46

## 2024-09-01 RX ADMIN — PIPERACILLIN AND TAZOBACTAM 3375 MG: 3; .375 INJECTION, POWDER, LYOPHILIZED, FOR SOLUTION INTRAVENOUS at 11:04

## 2024-09-01 RX ADMIN — ACETAMINOPHEN 650 MG: 325 TABLET ORAL at 16:05

## 2024-09-01 RX ADMIN — ARFORMOTEROL TARTRATE: 15 SOLUTION RESPIRATORY (INHALATION) at 10:23

## 2024-09-01 RX ADMIN — FENTANYL CITRATE 25 MCG: 50 INJECTION INTRAMUSCULAR; INTRAVENOUS at 01:33

## 2024-09-01 RX ADMIN — ARFORMOTEROL TARTRATE: 15 SOLUTION RESPIRATORY (INHALATION) at 19:03

## 2024-09-01 RX ADMIN — SODIUM CHLORIDE: 9 INJECTION, SOLUTION INTRAVENOUS at 11:04

## 2024-09-01 RX ADMIN — FENTANYL CITRATE 25 MCG: 50 INJECTION, SOLUTION INTRAMUSCULAR; INTRAVENOUS at 14:46

## 2024-09-01 RX ADMIN — MORPHINE SULFATE 2 MG: 2 INJECTION, SOLUTION INTRAMUSCULAR; INTRAVENOUS at 08:59

## 2024-09-01 RX ADMIN — MIDAZOLAM HYDROCHLORIDE 1 MG: 1 INJECTION, SOLUTION INTRAMUSCULAR; INTRAVENOUS at 14:46

## 2024-09-01 RX ADMIN — SODIUM CHLORIDE 1000 ML: 9 INJECTION, SOLUTION INTRAVENOUS at 01:37

## 2024-09-01 RX ADMIN — MORPHINE SULFATE 2 MG: 2 INJECTION, SOLUTION INTRAMUSCULAR; INTRAVENOUS at 23:21

## 2024-09-01 RX ADMIN — MONTELUKAST 10 MG: 10 TABLET, FILM COATED ORAL at 21:41

## 2024-09-01 RX ADMIN — MORPHINE SULFATE 2 MG: 2 INJECTION, SOLUTION INTRAMUSCULAR; INTRAVENOUS at 13:54

## 2024-09-01 RX ADMIN — PIPERACILLIN AND TAZOBACTAM 3375 MG: 3; .375 INJECTION, POWDER, LYOPHILIZED, FOR SOLUTION INTRAVENOUS at 17:34

## 2024-09-01 RX ADMIN — CEFAZOLIN 2000 MG: 2 INJECTION, POWDER, FOR SOLUTION INTRAMUSCULAR; INTRAVENOUS at 14:41

## 2024-09-01 RX ADMIN — DULOXETINE HYDROCHLORIDE 60 MG: 60 CAPSULE, DELAYED RELEASE ORAL at 11:07

## 2024-09-01 ASSESSMENT — PAIN DESCRIPTION - PAIN TYPE
TYPE: ACUTE PAIN;SURGICAL PAIN
TYPE: ACUTE PAIN

## 2024-09-01 ASSESSMENT — PAIN - FUNCTIONAL ASSESSMENT
PAIN_FUNCTIONAL_ASSESSMENT: 0-10
PAIN_FUNCTIONAL_ASSESSMENT: ACTIVITIES ARE NOT PREVENTED
PAIN_FUNCTIONAL_ASSESSMENT: 0-10

## 2024-09-01 ASSESSMENT — PAIN SCALES - GENERAL
PAINLEVEL_OUTOF10: 0
PAINLEVEL_OUTOF10: 3
PAINLEVEL_OUTOF10: 8
PAINLEVEL_OUTOF10: 7
PAINLEVEL_OUTOF10: 8
PAINLEVEL_OUTOF10: 10
PAINLEVEL_OUTOF10: 5
PAINLEVEL_OUTOF10: 5

## 2024-09-01 ASSESSMENT — PAIN DESCRIPTION - ORIENTATION
ORIENTATION: OTHER (COMMENT)
ORIENTATION: RIGHT
ORIENTATION: RIGHT;LOWER

## 2024-09-01 ASSESSMENT — PAIN DESCRIPTION - DESCRIPTORS
DESCRIPTORS: ACHING
DESCRIPTORS: ACHING;DISCOMFORT
DESCRIPTORS: CRAMPING;SHARP;SHOOTING

## 2024-09-01 ASSESSMENT — PAIN DESCRIPTION - LOCATION
LOCATION: ABDOMEN
LOCATION: BACK;BUTTOCKS
LOCATION: ABDOMEN

## 2024-09-01 ASSESSMENT — PAIN DESCRIPTION - FREQUENCY
FREQUENCY: CONTINUOUS
FREQUENCY: CONTINUOUS

## 2024-09-01 ASSESSMENT — PAIN DESCRIPTION - ONSET: ONSET: ON-GOING

## 2024-09-01 NOTE — PRE SEDATION
Sedation Pre-Procedure Note    Patient Name: Velma Mazariegos   YOB: 1965  Room/Bed: MILE/MILE  Medical Record Number: [id number]  Date: 2024   Time: 3:04 PM       Indication:  Deep pelvic abscess    Consent: I have discussed with the patient and/or the patient representative the indication, alternatives, and the possible risks and/or complications of the planned procedure and the anesthesia methods. The patient and/or patient representative appear to understand and agree to proceed.    Vital Signs:   Vitals:    24 1458   BP: 132/64   Pulse: (!) 139   Resp: 20   Temp:    SpO2: 98%       Past Medical History:   has a past medical history of Arthritis, Asthma, and Thyroid disease.    Past Surgical History:   has a past surgical history that includes  section and bladder suspension.    Medications:   Scheduled Meds:    DULoxetine  60 mg Oral Daily    arformoterol 15 mcg-budesonide 0.25 mg neb solution   Nebulization BID RT    [START ON 2024] levothyroxine  88 mcg Oral Daily    montelukast  10 mg Oral Nightly    sodium chloride flush  5-40 mL IntraVENous 2 times per day    insulin lispro  0-8 Units SubCUTAneous TID WC    insulin lispro  0-4 Units SubCUTAneous Nightly    piperacillin-tazobactam  3,375 mg IntraVENous Q8H     Continuous Infusions:    sodium chloride      sodium chloride 75 mL/hr at 24 1104     PRN Meds: albuterol, sodium chloride flush, sodium chloride, potassium chloride **OR** potassium alternative oral replacement **OR** potassium chloride, magnesium sulfate, ondansetron **OR** ondansetron, polyethylene glycol, acetaminophen **OR** acetaminophen, morphine, ceFAZolin, fentanNYL, midazolam, diphenhydrAMINE  Home Meds:   Prior to Admission medications    Medication Sig Start Date End Date Taking? Authorizing Provider   linagliptin (TRADJENTA) 5 MG tablet Take 1 tablet by mouth daily   Yes Provider, MD Chong   empagliflozin (JARDIANCE) 25 MG tablet Take 1 tablet

## 2024-09-01 NOTE — ED PROVIDER NOTES
HPI:  24,   Time: 11:12 PM EDT         Velma Mazariegos is a 58 y.o. female presenting to the ED for right sided flank pain and right lower quadrant abdominal pain, beginning days ago.  The complaint has been persistent, moderate in severity, and worsened by nothing.  Patient is a diabetic she is only had C-sections before she was found to have possible perforated diverticulitis and an abscess we started her on Zosyn her vital signs are stable I am giving her pain medication patient is a diabetic she is only had C-sections before    ROS:   Pertinent positives and negatives are stated within HPI, all other systems reviewed and are negative.  --------------------------------------------- PAST HISTORY ---------------------------------------------  Past Medical History:  has a past medical history of Arthritis, Asthma, and Thyroid disease.    Past Surgical History:  has a past surgical history that includes  section and bladder suspension.    Social History:  reports that she has been smoking. She has never used smokeless tobacco. She reports that she does not drink alcohol and does not use drugs.    Family History: family history is not on file.     The patient’s home medications have been reviewed.    Allergies: Eggs [egg white]    -------------------------------------------------- RESULTS -------------------------------------------------  All laboratory and radiology results have been personally reviewed by myself   LABS:  Results for orders placed or performed during the hospital encounter of 24   CBC   Result Value Ref Range    WBC 7.8 4.5 - 11.5 k/uL    RBC 4.64 3.50 - 5.50 m/uL    Hemoglobin 12.8 11.5 - 15.5 g/dL    Hematocrit 40.4 34.0 - 48.0 %    MCV 87.1 80.0 - 99.9 fL    MCH 27.6 26.0 - 35.0 pg    MCHC 31.7 (L) 32.0 - 34.5 g/dL    RDW 16.5 (H) 11.5 - 15.0 %    Platelets 305 130 - 450 k/uL    MPV 8.9 7.0 - 12.0 fL   Comprehensive Metabolic Panel   Result Value Ref Range    Sodium 136 132 - 
report was transcribed using voice recognition software. Every effort was made to ensure accuracy; however, inadvertent computerized transcription errors may be present          Seun Manzano MD  09/01/24 0246

## 2024-09-01 NOTE — H&P
[facility]              [address] [place] [address]                           HISTORY & PHYSICAL      PATIENT NAME: MARSHALL FIERRO            : 1965  MED REC NO: [id number]                        ROOM: 21  ACCOUNT NO: [id number]                       ADMIT DATE: 2024  PROVIDER: Chidi Granados DO      PRIMARY CARE PHYSICIAN:  Dr. Jose M Oconnor    CHIEF COMPLAINT:  Abdominal pain.    HISTORY OF PRESENT ILLNESS:  The patient is a 58-year-old  female who presented initially to [facility] emergency room for complaints of abdominal pain.  CT of her abdomen revealed findings concerning for perforated diverticular abscess.  She was transferred to [facility] for further evaluation and admission.    PAST MEDICAL HISTORY:  Eczema; hypothyroidism; asthma; osteoarthritis; type 2 diabetes mellitus; depression; obstructive sleep apnea, on CPAP.    MEDICATIONS:  Prior to admission; albuterol nebulizer p.r.n., albuterol inhaler, Cymbalta, Jardiance, Tradjenta, Advair, Synthroid, Mobic, Singulair.    PAST SURGICAL HISTORY:  , bladder suspension, left ankle surgery, wisdom teeth extraction.    SOCIAL HISTORY:  The patient smokes half-pack of cigarettes a day.  Rare alcohol.    REVIEW OF SYSTEMS:  Remarkable for above-stated chief complaint.    ALLERGIES:  TO EGGS.      PHYSICAL EXAMINATION:  GENERAL APPEARANCE:  Reveals a 58-year-old  female, who is alert and oriented x3, cooperative, and a good historian.  VITAL SIGNS:  On admission, temperature 98.4, pulse 99, respirations 18, blood pressure 112/60.  HEAD:  Normocephalic, atraumatic.  EYES:  Pupils equal and reactive to light.  Extraocular muscles intact.  Fundi not well visualized.  NOSE:  No obstruction, polyp, or discharge noted.  MOUTH:  Mucosa without lesion.  PHARYNX:  Noninjected without exudate.  NECK:  Supple.  No JVD.  No thyromegaly.  No carotid

## 2024-09-01 NOTE — DISCHARGE INSTRUCTIONS
Perineal Abscess: Care Instructions  Overview  A perineal abscess is an infection that causes a painful lump in the perineum. The perineum is the area between the scrotum and the anus in a man. In a woman, it's the area between the vulva and the anus. The area may look red and feel painful and be swollen. The abscess may form after surgery or after delivery of a baby. It can also be caused by an infection of the prostate gland. The prostate gland is an organ found inside a man's body, just below the bladder.  Your doctor may have done minor surgery to open and drain the abscess.  You may have had a sedative to help you relax. You may be unsteady after having sedation. It can take a few hours for the medicine's effects to wear off. Common side effects include nausea, vomiting, and feeling sleepy or tired.  The doctor has checked you carefully, but problems can develop later. If you notice any problems or new symptoms, get medical treatment right away.  Follow-up care is a key part of your treatment and safety. Be sure to make and go to all appointments, and call your doctor if you are having problems. It's also a good idea to know your test results and keep a list of the medicines you take.  How can you care for yourself at home?  If your doctor gave you a sedative:  For 24 hours, don't do anything that requires attention to detail. This includes going to work, making important decisions, or signing any legal documents. It takes time for the medicine's effects to completely wear off.  For your safety, do not drive or operate any machinery that could be dangerous. Wait until the medicine wears off. You need to be able to think clearly and react easily.  Be safe with medicines. Read and follow all instructions on the label.  If the doctor gave you a prescription medicine for pain, take it as prescribed.  If you are not taking a prescription pain medicine, ask your doctor if you can take an over-the-counter

## 2024-09-01 NOTE — PROCEDURES
PROCEDURE NOTE  Date: 9/1/2024   Name: Velma Mazariegos  YOB: 1965    Procedures: Abscess Drain  Report given to charge RN regarding procedures. All questions answered at this time. Patient placed in transport and specimen walked down to the lab.           Pt requesting refills of cymbalta, methotrexate and vitamin D.    PCP gone and on vacation for the week.  Wanted 90mg cymbalta instead of 60mg +30mg tabs.  Do not think there is a 90mg tab,so would just need 60mg tab as should have enough 30mg tabs until November.  Methotrexate last filled 6/5/17  Vitamin D last filled 8/19/16  Last seen 6/19/17 - preop.  Cannot fill without M.D. authorization.

## 2024-09-01 NOTE — BRIEF OP NOTE
Brief Postoperative Note      Patient: Velma Mazariegos  YOB: 1965  MRN: [id number]    Date of Procedure: 9/1/2024  Large Pelvic abscess    Post-Op Diagnosis: Same       CT guided pelvic abscess drainage and catheter placement    MAGDA Petty    Assistant:  * No surgical staff found *    Anesthesia: * No anesthesia type entered *    Estimated Blood Loss (mL): Minimal    Complications: None    Specimens:   Pus    Implants:  10 Fr Drain      Drains:   Closed/Suction Drain Right Buttock Bulb (Active)   Output (ml) 250 ml 09/01/24 1500       Findings:  Infection Present At Time Of Surgery (PATOS) (choose all levels that have infection present):  - Organ Space infection (below fascia) present as evidenced by pus  Other Findings: 80 ccs of pus was removed 10 Fr Drain placed    Electronically signed by Suraj Petty MD on 9/1/2024 at 3:05 PM

## 2024-09-01 NOTE — CONSULTS
GENERAL SURGERY  CONSULT NOTE    Patient's Name/Date of Birth: Velma Mazariegos / 1965    Date: 2024     PCP: Niall Oconnor MD     Chief Complaint:   Chief Complaint   Patient presents with    Flank Pain     Right side flank across mid ab pain; since . Perforated diverticulitis transfer from [facility]        Physician Consulted: Dr. Stephens  Reason for Consult: Perforated diverticulitis  Referring Physician: Dr. Natacha FRAZIER  Velma Mazariegos is a 58 y.o. female who presents for evaluation of right-sided, constant, sharp abdominal pain for 1 week, moderate in severity.  Decreased appetite.  Tolerates liquids only.  Reports chills and nausea with dry heaving but denies emesis.  Patient has had 3 prior episodes of similar abdominal pain.  Most recent episode before this one  was 6 months ago.  First episode of similar pain that patient remembers was in 2023.  Patient has known history of sigmoid colon diverticulitis, chronic constipation.  Labs are unremarkable, patient is vitally stable and afebrile, WBC count of 7.8, no electrolyte abnormalities. Pt received Zosyn in ED. Large 10 X 5cm walled off  diverticular abscess noted adjacent to middle of sigmoid colon.    Past Medical History:   Diagnosis Date    Arthritis     Asthma     Thyroid disease        Past Surgical History:   Procedure Laterality Date    BLADDER SUSPENSION       SECTION         Medications Prior to Admission:    Prior to Admission medications    Medication Sig Start Date End Date Taking? Authorizing Provider   Empagliflozin (JARDIANCE PO) Take by mouth   Yes ProviderChong MD   fluticasone-salmeterol (ADVAIR HFA) 45-21 MCG/ACT inhaler Inhale 2 puffs into the lungs 2 times daily   Yes ProviderChong MD   brompheniramine-pseudoephedrine-DM 2-30-10 MG/5ML syrup Take 10 mLs by mouth 3 times daily as needed for Congestion or Cough  Patient not taking: Reported on 2024   
cetirizine (ZYRTEC ALLERGY) 10 MG tablet Take 1 tablet by mouth daily as needed for Allergies 12 tablet 0    Fluticasone Furoate-Vilanterol (BREO ELLIPTA IN) Inhale into the lungs (Patient not taking: Reported on 2024)      nystatin-triamcinolone (MYCOLOG II) 829808-9.1 UNIT/GM-% cream Apply topically 4 times daily. (Patient not taking: Reported on 2024) 30 g 0    meloxicam (MOBIC) 15 MG tablet Take 15 mg by mouth daily.      duloxetine (CYMBALTA) 60 MG capsule Take 60 mg by mouth daily.        albuterol (PROVENTIL) (2.5 MG/3ML) 0.083% nebulizer solution Take 2.5 mg by nebulization 4 times daily.      levothyroxine (SYNTHROID) 150 MCG tablet Take 88 mcg by mouth daily       montelukast (SINGULAIR) 10 MG tablet Take 10 mg by mouth nightly.           Allergies:  Eggs [egg white]    Social History:    Social History     Socioeconomic History    Marital status:      Spouse name: Not on file    Number of children: Not on file    Years of education: Not on file    Highest education level: Not on file   Occupational History    Not on file   Tobacco Use    Smoking status: Light Smoker     Current packs/day: 0.00     Types: Cigarettes     Last attempt to quit: 2012     Years since quittin.5    Smokeless tobacco: Never   Substance and Sexual Activity    Alcohol use: No    Drug use: No    Sexual activity: Not on file   Other Topics Concern    Not on file   Social History Narrative    Not on file     Social Determinants of Health     Financial Resource Strain: Not on file   Food Insecurity: Not on file   Transportation Needs: Not on file   Physical Activity: Not on file   Stress: Not on file   Social Connections: Not on file   Intimate Partner Violence: Not on file   Housing Stability: Not on file         Family History:     History reviewed. No pertinent family history.    REVIEW OF SYSTEMS:    CONSTITUTIONAL: Chills   HEENT: denies blurring of vision or double vision, denies hearing

## 2024-09-02 LAB
ALBUMIN SERPL-MCNC: 2.6 G/DL (ref 3.5–5.2)
ALP SERPL-CCNC: 83 U/L (ref 35–104)
ALT SERPL-CCNC: 16 U/L (ref 0–32)
ANION GAP SERPL CALCULATED.3IONS-SCNC: 12 MMOL/L (ref 7–16)
AST SERPL-CCNC: 16 U/L (ref 0–31)
BILIRUB SERPL-MCNC: 0.3 MG/DL (ref 0–1.2)
BUN SERPL-MCNC: 10 MG/DL (ref 6–23)
CALCIUM SERPL-MCNC: 8.3 MG/DL (ref 8.6–10.2)
CHLORIDE SERPL-SCNC: 103 MMOL/L (ref 98–107)
CO2 SERPL-SCNC: 20 MMOL/L (ref 22–29)
CREAT SERPL-MCNC: 0.8 MG/DL (ref 0.5–1)
ERYTHROCYTE [DISTWIDTH] IN BLOOD BY AUTOMATED COUNT: 16.3 % (ref 11.5–15)
GFR, ESTIMATED: 83 ML/MIN/1.73M2
GLUCOSE BLD-MCNC: 112 MG/DL (ref 74–99)
GLUCOSE BLD-MCNC: 127 MG/DL (ref 74–99)
GLUCOSE BLD-MCNC: 130 MG/DL (ref 74–99)
GLUCOSE BLD-MCNC: 194 MG/DL (ref 74–99)
GLUCOSE SERPL-MCNC: 95 MG/DL (ref 74–99)
HCT VFR BLD AUTO: 34.3 % (ref 34–48)
HGB BLD-MCNC: 10.9 G/DL (ref 11.5–15.5)
MCH RBC QN AUTO: 29 PG (ref 26–35)
MCHC RBC AUTO-ENTMCNC: 31.8 G/DL (ref 32–34.5)
MCV RBC AUTO: 91.2 FL (ref 80–99.9)
PLATELET # BLD AUTO: 335 K/UL (ref 130–450)
PMV BLD AUTO: 9 FL (ref 7–12)
POTASSIUM SERPL-SCNC: 3.8 MMOL/L (ref 3.5–5)
PROT SERPL-MCNC: 5.6 G/DL (ref 6.4–8.3)
RBC # BLD AUTO: 3.76 M/UL (ref 3.5–5.5)
SODIUM SERPL-SCNC: 135 MMOL/L (ref 132–146)
WBC OTHER # BLD: 8.5 K/UL (ref 4.5–11.5)

## 2024-09-02 PROCEDURE — 6370000000 HC RX 637 (ALT 250 FOR IP): Performed by: INTERNAL MEDICINE

## 2024-09-02 PROCEDURE — 99232 SBSQ HOSP IP/OBS MODERATE 35: CPT | Performed by: SURGERY

## 2024-09-02 PROCEDURE — 82962 GLUCOSE BLOOD TEST: CPT

## 2024-09-02 PROCEDURE — 6370000000 HC RX 637 (ALT 250 FOR IP)

## 2024-09-02 PROCEDURE — 6360000002 HC RX W HCPCS: Performed by: INTERNAL MEDICINE

## 2024-09-02 PROCEDURE — 2060000000 HC ICU INTERMEDIATE R&B

## 2024-09-02 PROCEDURE — 6360000002 HC RX W HCPCS: Performed by: STUDENT IN AN ORGANIZED HEALTH CARE EDUCATION/TRAINING PROGRAM

## 2024-09-02 PROCEDURE — 80053 COMPREHEN METABOLIC PANEL: CPT

## 2024-09-02 PROCEDURE — 2580000003 HC RX 258: Performed by: INTERNAL MEDICINE

## 2024-09-02 PROCEDURE — 94640 AIRWAY INHALATION TREATMENT: CPT

## 2024-09-02 PROCEDURE — 36415 COLL VENOUS BLD VENIPUNCTURE: CPT

## 2024-09-02 PROCEDURE — 85027 COMPLETE CBC AUTOMATED: CPT

## 2024-09-02 PROCEDURE — 2580000003 HC RX 258: Performed by: STUDENT IN AN ORGANIZED HEALTH CARE EDUCATION/TRAINING PROGRAM

## 2024-09-02 RX ORDER — GLUCAGON 1 MG/ML
1 KIT INJECTION PRN
Status: DISCONTINUED | OUTPATIENT
Start: 2024-09-02 | End: 2024-09-04 | Stop reason: HOSPADM

## 2024-09-02 RX ORDER — DEXTROSE MONOHYDRATE 100 MG/ML
INJECTION, SOLUTION INTRAVENOUS CONTINUOUS PRN
Status: DISCONTINUED | OUTPATIENT
Start: 2024-09-02 | End: 2024-09-04 | Stop reason: HOSPADM

## 2024-09-02 RX ORDER — POLYETHYLENE GLYCOL 3350 17 G/17G
17 POWDER, FOR SOLUTION ORAL DAILY
Status: DISCONTINUED | OUTPATIENT
Start: 2024-09-02 | End: 2024-09-04 | Stop reason: HOSPADM

## 2024-09-02 RX ORDER — SODIUM CHLORIDE 0.9 % (FLUSH) 0.9 %
5-40 SYRINGE (ML) INJECTION EVERY 12 HOURS SCHEDULED
Status: DISCONTINUED | OUTPATIENT
Start: 2024-09-02 | End: 2024-09-04 | Stop reason: HOSPADM

## 2024-09-02 RX ORDER — SODIUM CHLORIDE 9 MG/ML
INJECTION, SOLUTION INTRAVENOUS PRN
Status: DISCONTINUED | OUTPATIENT
Start: 2024-09-02 | End: 2024-09-04 | Stop reason: HOSPADM

## 2024-09-02 RX ORDER — SODIUM CHLORIDE 0.9 % (FLUSH) 0.9 %
5-40 SYRINGE (ML) INJECTION PRN
Status: DISCONTINUED | OUTPATIENT
Start: 2024-09-02 | End: 2024-09-04 | Stop reason: HOSPADM

## 2024-09-02 RX ORDER — LIDOCAINE HYDROCHLORIDE 10 MG/ML
50 INJECTION, SOLUTION INFILTRATION; PERINEURAL ONCE
Status: DISCONTINUED | OUTPATIENT
Start: 2024-09-02 | End: 2024-09-04 | Stop reason: HOSPADM

## 2024-09-02 RX ADMIN — PIPERACILLIN AND TAZOBACTAM 3375 MG: 3; .375 INJECTION, POWDER, LYOPHILIZED, FOR SOLUTION INTRAVENOUS at 02:56

## 2024-09-02 RX ADMIN — ARFORMOTEROL TARTRATE: 15 SOLUTION RESPIRATORY (INHALATION) at 09:04

## 2024-09-02 RX ADMIN — SODIUM CHLORIDE, PRESERVATIVE FREE 10 ML: 5 INJECTION INTRAVENOUS at 21:15

## 2024-09-02 RX ADMIN — SODIUM CHLORIDE, PRESERVATIVE FREE 10 ML: 5 INJECTION INTRAVENOUS at 08:11

## 2024-09-02 RX ADMIN — MORPHINE SULFATE 2 MG: 2 INJECTION, SOLUTION INTRAMUSCULAR; INTRAVENOUS at 03:35

## 2024-09-02 RX ADMIN — MORPHINE SULFATE 2 MG: 2 INJECTION, SOLUTION INTRAMUSCULAR; INTRAVENOUS at 21:15

## 2024-09-02 RX ADMIN — LEVOTHYROXINE SODIUM 88 MCG: 0.09 TABLET ORAL at 08:11

## 2024-09-02 RX ADMIN — SODIUM CHLORIDE: 9 INJECTION, SOLUTION INTRAVENOUS at 18:24

## 2024-09-02 RX ADMIN — ARFORMOTEROL TARTRATE: 15 SOLUTION RESPIRATORY (INHALATION) at 18:51

## 2024-09-02 RX ADMIN — MORPHINE SULFATE 2 MG: 2 INJECTION, SOLUTION INTRAMUSCULAR; INTRAVENOUS at 15:14

## 2024-09-02 RX ADMIN — MORPHINE SULFATE 2 MG: 2 INJECTION, SOLUTION INTRAMUSCULAR; INTRAVENOUS at 08:10

## 2024-09-02 RX ADMIN — MONTELUKAST 10 MG: 10 TABLET, FILM COATED ORAL at 21:16

## 2024-09-02 RX ADMIN — SODIUM CHLORIDE: 9 INJECTION, SOLUTION INTRAVENOUS at 05:42

## 2024-09-02 RX ADMIN — DULOXETINE HYDROCHLORIDE 60 MG: 60 CAPSULE, DELAYED RELEASE ORAL at 08:11

## 2024-09-02 RX ADMIN — PIPERACILLIN AND TAZOBACTAM 3375 MG: 3; .375 INJECTION, POWDER, LYOPHILIZED, FOR SOLUTION INTRAVENOUS at 10:31

## 2024-09-02 RX ADMIN — PIPERACILLIN AND TAZOBACTAM 3375 MG: 3; .375 INJECTION, POWDER, LYOPHILIZED, FOR SOLUTION INTRAVENOUS at 18:21

## 2024-09-02 RX ADMIN — POLYETHYLENE GLYCOL 3350 17 G: 17 POWDER, FOR SOLUTION ORAL at 08:11

## 2024-09-02 ASSESSMENT — PAIN SCALES - GENERAL
PAINLEVEL_OUTOF10: 10
PAINLEVEL_OUTOF10: 3
PAINLEVEL_OUTOF10: 8
PAINLEVEL_OUTOF10: 0
PAINLEVEL_OUTOF10: 3
PAINLEVEL_OUTOF10: 8
PAINLEVEL_OUTOF10: 2
PAINLEVEL_OUTOF10: 0
PAINLEVEL_OUTOF10: 8

## 2024-09-02 ASSESSMENT — PAIN DESCRIPTION - DESCRIPTORS
DESCRIPTORS: ACHING
DESCRIPTORS: ACHING;DISCOMFORT;SORE
DESCRIPTORS: DISCOMFORT

## 2024-09-02 ASSESSMENT — PAIN DESCRIPTION - ORIENTATION
ORIENTATION: RIGHT

## 2024-09-02 ASSESSMENT — PAIN DESCRIPTION - LOCATION
LOCATION: BUTTOCKS;BACK
LOCATION: BUTTOCKS;BACK
LOCATION: BACK;BUTTOCKS
LOCATION: BUTTOCKS
LOCATION: BUTTOCKS;BACK

## 2024-09-02 ASSESSMENT — PAIN DESCRIPTION - PAIN TYPE
TYPE: ACUTE PAIN
TYPE: ACUTE PAIN

## 2024-09-02 ASSESSMENT — PAIN SCALES - WONG BAKER: WONGBAKER_NUMERICALRESPONSE: NO HURT

## 2024-09-02 NOTE — PLAN OF CARE
Problem: Discharge Planning  Goal: Discharge to home or other facility with appropriate resources  9/2/2024 1115 by Nimco Baugh RN  Outcome: Progressing  9/2/2024 0236 by Hilaria Willis RN  Outcome: Progressing  Flowsheets (Taken 9/1/2024 1600 by Jone Kenney RN)  Discharge to home or other facility with appropriate resources:   Identify barriers to discharge with patient and caregiver   Identify discharge learning needs (meds, wound care, etc)   Refer to discharge planning if patient needs post-hospital services based on physician order or complex needs related to functional status, cognitive ability or social support system   Arrange for needed discharge resources and transportation as appropriate   Arrange for interpreters to assist at discharge as needed     Problem: Pain  Goal: Verbalizes/displays adequate comfort level or baseline comfort level  9/2/2024 1115 by Nimco Baugh, RN  Outcome: Progressing  9/2/2024 0236 by Hilaria Willis, RN  Outcome: Progressing

## 2024-09-02 NOTE — PLAN OF CARE
Problem: Discharge Planning  Goal: Discharge to home or other facility with appropriate resources  Outcome: Progressing  Flowsheets (Taken 9/1/2024 1600 by Jone Kenney RN)  Discharge to home or other facility with appropriate resources:   Identify barriers to discharge with patient and caregiver   Identify discharge learning needs (meds, wound care, etc)   Refer to discharge planning if patient needs post-hospital services based on physician order or complex needs related to functional status, cognitive ability or social support system   Arrange for needed discharge resources and transportation as appropriate   Arrange for interpreters to assist at discharge as needed     Problem: Pain  Goal: Verbalizes/displays adequate comfort level or baseline comfort level  Outcome: Progressing     Problem: Safety - Adult  Goal: Free from fall injury  Outcome: Progressing

## 2024-09-03 LAB
ANION GAP SERPL CALCULATED.3IONS-SCNC: 11 MMOL/L (ref 7–16)
BASOPHILS # BLD: 0 K/UL (ref 0–0.2)
BASOPHILS NFR BLD: 0 % (ref 0–2)
BUN SERPL-MCNC: 10 MG/DL (ref 6–23)
CALCIUM SERPL-MCNC: 8.7 MG/DL (ref 8.6–10.2)
CHLORIDE SERPL-SCNC: 103 MMOL/L (ref 98–107)
CO2 SERPL-SCNC: 23 MMOL/L (ref 22–29)
CREAT SERPL-MCNC: 0.8 MG/DL (ref 0.5–1)
EOSINOPHIL # BLD: 0.23 K/UL (ref 0.05–0.5)
EOSINOPHILS RELATIVE PERCENT: 3 % (ref 0–6)
ERYTHROCYTE [DISTWIDTH] IN BLOOD BY AUTOMATED COUNT: 15.7 % (ref 11.5–15)
GFR, ESTIMATED: 84 ML/MIN/1.73M2
GLUCOSE BLD-MCNC: 115 MG/DL (ref 74–99)
GLUCOSE BLD-MCNC: 140 MG/DL (ref 74–99)
GLUCOSE BLD-MCNC: 155 MG/DL (ref 74–99)
GLUCOSE BLD-MCNC: 178 MG/DL (ref 74–99)
GLUCOSE SERPL-MCNC: 172 MG/DL (ref 74–99)
HCT VFR BLD AUTO: 35.7 % (ref 34–48)
HGB BLD-MCNC: 11.3 G/DL (ref 11.5–15.5)
LYMPHOCYTES NFR BLD: 1.38 K/UL (ref 1.5–4)
LYMPHOCYTES RELATIVE PERCENT: 16 % (ref 20–42)
MCH RBC QN AUTO: 28.8 PG (ref 26–35)
MCHC RBC AUTO-ENTMCNC: 31.7 G/DL (ref 32–34.5)
MCV RBC AUTO: 90.8 FL (ref 80–99.9)
MICROORGANISM SPEC CULT: NORMAL
MONOCYTES NFR BLD: 0.38 K/UL (ref 0.1–0.95)
MONOCYTES NFR BLD: 4 % (ref 2–12)
NEUTROPHILS NFR BLD: 77 % (ref 43–80)
NEUTS SEG NFR BLD: 6.81 K/UL (ref 1.8–7.3)
PLATELET # BLD AUTO: 398 K/UL (ref 130–450)
PMV BLD AUTO: 8.7 FL (ref 7–12)
POTASSIUM SERPL-SCNC: 4 MMOL/L (ref 3.5–5)
RBC # BLD AUTO: 3.93 M/UL (ref 3.5–5.5)
RBC # BLD: ABNORMAL 10*6/UL
SODIUM SERPL-SCNC: 137 MMOL/L (ref 132–146)
SPECIMEN DESCRIPTION: NORMAL
WBC OTHER # BLD: 8.8 K/UL (ref 4.5–11.5)

## 2024-09-03 PROCEDURE — 6360000002 HC RX W HCPCS: Performed by: INTERNAL MEDICINE

## 2024-09-03 PROCEDURE — 2580000003 HC RX 258: Performed by: INTERNAL MEDICINE

## 2024-09-03 PROCEDURE — 36569 INSJ PICC 5 YR+ W/O IMAGING: CPT

## 2024-09-03 PROCEDURE — 6360000002 HC RX W HCPCS: Performed by: STUDENT IN AN ORGANIZED HEALTH CARE EDUCATION/TRAINING PROGRAM

## 2024-09-03 PROCEDURE — C1751 CATH, INF, PER/CENT/MIDLINE: HCPCS

## 2024-09-03 PROCEDURE — 82962 GLUCOSE BLOOD TEST: CPT

## 2024-09-03 PROCEDURE — 94640 AIRWAY INHALATION TREATMENT: CPT

## 2024-09-03 PROCEDURE — 85025 COMPLETE CBC W/AUTO DIFF WBC: CPT

## 2024-09-03 PROCEDURE — 6370000000 HC RX 637 (ALT 250 FOR IP)

## 2024-09-03 PROCEDURE — 02HV33Z INSERTION OF INFUSION DEVICE INTO SUPERIOR VENA CAVA, PERCUTANEOUS APPROACH: ICD-10-PCS | Performed by: STUDENT IN AN ORGANIZED HEALTH CARE EDUCATION/TRAINING PROGRAM

## 2024-09-03 PROCEDURE — 2580000003 HC RX 258: Performed by: STUDENT IN AN ORGANIZED HEALTH CARE EDUCATION/TRAINING PROGRAM

## 2024-09-03 PROCEDURE — 76937 US GUIDE VASCULAR ACCESS: CPT

## 2024-09-03 PROCEDURE — 36415 COLL VENOUS BLD VENIPUNCTURE: CPT

## 2024-09-03 PROCEDURE — 80048 BASIC METABOLIC PNL TOTAL CA: CPT

## 2024-09-03 PROCEDURE — 2060000000 HC ICU INTERMEDIATE R&B

## 2024-09-03 PROCEDURE — 6370000000 HC RX 637 (ALT 250 FOR IP): Performed by: INTERNAL MEDICINE

## 2024-09-03 PROCEDURE — 99232 SBSQ HOSP IP/OBS MODERATE 35: CPT | Performed by: SURGERY

## 2024-09-03 RX ORDER — TAZOBACTAM SODIUM AND PIPERACILLIN SODIUM 375; 3 MG/50ML; G/50ML
3375 INJECTION, SOLUTION INTRAVENOUS EVERY 8 HOURS
Qty: 2100 ML | Refills: 0 | Status: SHIPPED | OUTPATIENT
Start: 2024-09-03 | End: 2024-09-17

## 2024-09-03 RX ORDER — TAZOBACTAM SODIUM AND PIPERACILLIN SODIUM 375; 3 MG/50ML; G/50ML
3375 INJECTION, SOLUTION INTRAVENOUS EVERY 8 HOURS
Qty: 2100 ML | Refills: 0 | Status: SHIPPED | OUTPATIENT
Start: 2024-09-03 | End: 2024-09-03

## 2024-09-03 RX ADMIN — MORPHINE SULFATE 2 MG: 2 INJECTION, SOLUTION INTRAMUSCULAR; INTRAVENOUS at 22:43

## 2024-09-03 RX ADMIN — PIPERACILLIN AND TAZOBACTAM 3375 MG: 3; .375 INJECTION, POWDER, LYOPHILIZED, FOR SOLUTION INTRAVENOUS at 18:07

## 2024-09-03 RX ADMIN — MORPHINE SULFATE 2 MG: 2 INJECTION, SOLUTION INTRAMUSCULAR; INTRAVENOUS at 08:06

## 2024-09-03 RX ADMIN — LEVOTHYROXINE SODIUM 88 MCG: 0.09 TABLET ORAL at 08:07

## 2024-09-03 RX ADMIN — ARFORMOTEROL TARTRATE: 15 SOLUTION RESPIRATORY (INHALATION) at 18:06

## 2024-09-03 RX ADMIN — POLYETHYLENE GLYCOL 3350 17 G: 17 POWDER, FOR SOLUTION ORAL at 08:06

## 2024-09-03 RX ADMIN — SODIUM CHLORIDE, PRESERVATIVE FREE 10 ML: 5 INJECTION INTRAVENOUS at 08:07

## 2024-09-03 RX ADMIN — ARFORMOTEROL TARTRATE: 15 SOLUTION RESPIRATORY (INHALATION) at 09:01

## 2024-09-03 RX ADMIN — MORPHINE SULFATE 2 MG: 2 INJECTION, SOLUTION INTRAMUSCULAR; INTRAVENOUS at 17:41

## 2024-09-03 RX ADMIN — DULOXETINE HYDROCHLORIDE 60 MG: 60 CAPSULE, DELAYED RELEASE ORAL at 08:07

## 2024-09-03 RX ADMIN — SODIUM CHLORIDE, PRESERVATIVE FREE 10 ML: 5 INJECTION INTRAVENOUS at 08:08

## 2024-09-03 RX ADMIN — MONTELUKAST 10 MG: 10 TABLET, FILM COATED ORAL at 19:54

## 2024-09-03 RX ADMIN — SODIUM CHLORIDE, PRESERVATIVE FREE 10 ML: 5 INJECTION INTRAVENOUS at 19:55

## 2024-09-03 RX ADMIN — MORPHINE SULFATE 2 MG: 2 INJECTION, SOLUTION INTRAMUSCULAR; INTRAVENOUS at 02:21

## 2024-09-03 RX ADMIN — PIPERACILLIN AND TAZOBACTAM 3375 MG: 3; .375 INJECTION, POWDER, LYOPHILIZED, FOR SOLUTION INTRAVENOUS at 10:25

## 2024-09-03 RX ADMIN — PIPERACILLIN AND TAZOBACTAM 3375 MG: 3; .375 INJECTION, POWDER, LYOPHILIZED, FOR SOLUTION INTRAVENOUS at 02:17

## 2024-09-03 ASSESSMENT — PAIN SCALES - WONG BAKER: WONGBAKER_NUMERICALRESPONSE: NO HURT

## 2024-09-03 ASSESSMENT — PAIN SCALES - GENERAL
PAINLEVEL_OUTOF10: 4
PAINLEVEL_OUTOF10: 3
PAINLEVEL_OUTOF10: 3
PAINLEVEL_OUTOF10: 8
PAINLEVEL_OUTOF10: 8
PAINLEVEL_OUTOF10: 0
PAINLEVEL_OUTOF10: 8
PAINLEVEL_OUTOF10: 2
PAINLEVEL_OUTOF10: 9
PAINLEVEL_OUTOF10: 0

## 2024-09-03 ASSESSMENT — PAIN DESCRIPTION - ORIENTATION
ORIENTATION: RIGHT

## 2024-09-03 ASSESSMENT — PULMONARY FUNCTION TESTS: PEFR_L/MIN: 16

## 2024-09-03 ASSESSMENT — PAIN DESCRIPTION - PAIN TYPE: TYPE: ACUTE PAIN

## 2024-09-03 ASSESSMENT — PAIN DESCRIPTION - LOCATION
LOCATION: BACK;BUTTOCKS
LOCATION: FLANK
LOCATION: ABDOMEN
LOCATION: BACK;FLANK
LOCATION: BACK;BUTTOCKS

## 2024-09-03 ASSESSMENT — PAIN DESCRIPTION - DESCRIPTORS
DESCRIPTORS: ACHING
DESCRIPTORS: ACHING;DISCOMFORT;SORE
DESCRIPTORS: ACHING;DISCOMFORT
DESCRIPTORS: ACHING;DISCOMFORT;SORE
DESCRIPTORS: ACHING;DISCOMFORT

## 2024-09-03 NOTE — PLAN OF CARE
Problem: Discharge Planning  Goal: Discharge to home or other facility with appropriate resources  9/3/2024 0047 by Tahmina Asher, RN  Outcome: Progressing  9/2/2024 1115 by Nimco Baugh RN  Outcome: Progressing  Flowsheets (Taken 9/2/2024 0808)  Discharge to home or other facility with appropriate resources: Identify barriers to discharge with patient and caregiver     Problem: Pain  Goal: Verbalizes/displays adequate comfort level or baseline comfort level  9/3/2024 0047 by Tahmina Asher RN  Outcome: Progressing  Flowsheets (Taken 9/2/2024 1513 by Nimco Baugh, RN)  Verbalizes/displays adequate comfort level or baseline comfort level: Encourage patient to monitor pain and request assistance  9/2/2024 1115 by Nimco Baugh RN  Outcome: Progressing     Problem: Safety - Adult  Goal: Free from fall injury  Outcome: Progressing

## 2024-09-03 NOTE — HOME CARE
MARSHALL PROVERBS MAY BE RESPONSIBLE FOR A $3 CO PAY PER DRUG DISPENSE.      Referral received for home IV infusion.  Spoke with patient's son, Everardo, demographics verified.  IV coverage verified. Explained IV benefit. He is agreeable to home care. Will need [facility] order, IV ATB script and pharmacy consent form faxed to [phone].  Kathy Soria LPN [facility].

## 2024-09-03 NOTE — PLAN OF CARE
Problem: Discharge Planning  Goal: Discharge to home or other facility with appropriate resources  9/3/2024 0958 by Nimco Baugh RN  Outcome: Progressing  9/3/2024 0047 by Tahmina Asher RN  Outcome: Progressing     Problem: Pain  Goal: Verbalizes/displays adequate comfort level or baseline comfort level  9/3/2024 0958 by Nimco Baugh RN  Outcome: Progressing  Flowsheets  Taken 9/3/2024 0805  Verbalizes/displays adequate comfort level or baseline comfort level: Encourage patient to monitor pain and request assistance  Taken 9/3/2024 0745  Verbalizes/displays adequate comfort level or baseline comfort level: Encourage patient to monitor pain and request assistance  9/3/2024 0047 by Tahmina Asher RN  Outcome: Progressing  Flowsheets (Taken 9/2/2024 1513 by Nimco Baugh RN)  Verbalizes/displays adequate comfort level or baseline comfort level: Encourage patient to monitor pain and request assistance     Problem: Safety - Adult  Goal: Free from fall injury  9/3/2024 0958 by Nimco Baugh RN  Outcome: Progressing  Flowsheets (Taken 9/3/2024 0805)  Free From Fall Injury: Instruct family/caregiver on patient safety  9/3/2024 0047 by Tahmina Asher RN  Outcome: Progressing

## 2024-09-03 NOTE — CARE COORDINATION
Pt lives at home alone and still works full time as a . She has 3 grown children with all local and work. Pt is independent and drives. No hhc pta. Pt has a fww but not using it. She also has a cpap machine that she uses every hs, and shower chair. Pt is diabetic but not on insulin pta and has a working glucometer. Her pcp is dr. Niall Oconnor whom she saw about 2 months ago. Pt has a 2 story home with 1 st floor bed and bath and uses the back door with 1 step in then 4 up to the main floor. Her daughter in law is a rn and we talked about need for iv abx's with a picc line to be placed after lunch today. Provided hhc list and she [facility] no preference[facility] and will see tonight for her 6:30 p.m. dose, they cannot take pt tomorrow. Pt is in agreement. Waiting for ID to round for iv abx script and dosing. Will need hhc orders. CÉSAR Nagy  9/3/2024  Pt has a $3 a dose copay. Notified pt and she is signing the [facility] iv abx agreement form. CÉSAR Nagy  ,9/3/2024  ID is ok with discharge. Script faxed to [facility] and that pt will go home today. Rn to get hhc orders from pcp. CÉSAR Nagy  9/3/2024  General surgery is holding pt due to bowel function. I called [facility] and they will plan on seeing pt tomorrow for her 6:30 p.m. dose. Hhc orders are in epic. .CÉSAR Nagy  9/3/2024        The Plan for Transition of Care is related to the following treatment goals: hhc   The Patient and/or patient representative  was provided with a choice of provider and agrees   with the discharge plan. [x] Yes [] No  Freedom of choice list was provided with basic dialogue that supports the patient's individualized plan of care/goals, treatment preferences and shares the quality data associated with the providers. [x] Yes [] No    Case Management Assessment  Initial Evaluation    Date/Time of Evaluation: 9/3/2024 12:23 PM  Assessment Completed by: Susy King

## 2024-09-04 VITALS
HEART RATE: 86 BPM | TEMPERATURE: 98.4 F | OXYGEN SATURATION: 99 % | RESPIRATION RATE: 17 BRPM | SYSTOLIC BLOOD PRESSURE: 128 MMHG | DIASTOLIC BLOOD PRESSURE: 75 MMHG

## 2024-09-04 LAB
ANION GAP SERPL CALCULATED.3IONS-SCNC: 13 MMOL/L (ref 7–16)
BASOPHILS # BLD: 0.05 K/UL (ref 0–0.2)
BASOPHILS NFR BLD: 1 % (ref 0–2)
BUN SERPL-MCNC: 7 MG/DL (ref 6–23)
CALCIUM SERPL-MCNC: 8.9 MG/DL (ref 8.6–10.2)
CHLORIDE SERPL-SCNC: 104 MMOL/L (ref 98–107)
CO2 SERPL-SCNC: 23 MMOL/L (ref 22–29)
CREAT SERPL-MCNC: 0.7 MG/DL (ref 0.5–1)
EKG ATRIAL RATE: 97 BPM
EKG P AXIS: 58 DEGREES
EKG P-R INTERVAL: 160 MS
EKG Q-T INTERVAL: 338 MS
EKG QRS DURATION: 84 MS
EKG QTC CALCULATION (BAZETT): 429 MS
EKG R AXIS: -26 DEGREES
EKG T AXIS: 49 DEGREES
EKG VENTRICULAR RATE: 97 BPM
EOSINOPHIL # BLD: 0.15 K/UL (ref 0.05–0.5)
EOSINOPHILS RELATIVE PERCENT: 2 % (ref 0–6)
ERYTHROCYTE [DISTWIDTH] IN BLOOD BY AUTOMATED COUNT: 15.8 % (ref 11.5–15)
GFR, ESTIMATED: >90 ML/MIN/1.73M2
GLUCOSE BLD-MCNC: 104 MG/DL (ref 74–99)
GLUCOSE BLD-MCNC: 126 MG/DL (ref 74–99)
GLUCOSE SERPL-MCNC: 103 MG/DL (ref 74–99)
HCT VFR BLD AUTO: 35.2 % (ref 34–48)
HGB BLD-MCNC: 11.1 G/DL (ref 11.5–15.5)
IMM GRANULOCYTES # BLD AUTO: 0.08 K/UL (ref 0–0.58)
IMM GRANULOCYTES NFR BLD: 1 % (ref 0–5)
LYMPHOCYTES NFR BLD: 1.76 K/UL (ref 1.5–4)
LYMPHOCYTES RELATIVE PERCENT: 24 % (ref 20–42)
MCH RBC QN AUTO: 28.5 PG (ref 26–35)
MCHC RBC AUTO-ENTMCNC: 31.5 G/DL (ref 32–34.5)
MCV RBC AUTO: 90.3 FL (ref 80–99.9)
MONOCYTES NFR BLD: 0.41 K/UL (ref 0.1–0.95)
MONOCYTES NFR BLD: 6 % (ref 2–12)
NEUTROPHILS NFR BLD: 66 % (ref 43–80)
NEUTS SEG NFR BLD: 4.78 K/UL (ref 1.8–7.3)
PLATELET # BLD AUTO: 401 K/UL (ref 130–450)
PMV BLD AUTO: 8.6 FL (ref 7–12)
POTASSIUM SERPL-SCNC: 3.8 MMOL/L (ref 3.5–5)
RBC # BLD AUTO: 3.9 M/UL (ref 3.5–5.5)
RBC # BLD: ABNORMAL 10*6/UL
SODIUM SERPL-SCNC: 140 MMOL/L (ref 132–146)
WBC OTHER # BLD: 7.2 K/UL (ref 4.5–11.5)

## 2024-09-04 PROCEDURE — 6360000002 HC RX W HCPCS: Performed by: INTERNAL MEDICINE

## 2024-09-04 PROCEDURE — 99232 SBSQ HOSP IP/OBS MODERATE 35: CPT | Performed by: SURGERY

## 2024-09-04 PROCEDURE — 2580000003 HC RX 258: Performed by: STUDENT IN AN ORGANIZED HEALTH CARE EDUCATION/TRAINING PROGRAM

## 2024-09-04 PROCEDURE — 2580000003 HC RX 258: Performed by: INTERNAL MEDICINE

## 2024-09-04 PROCEDURE — 85025 COMPLETE CBC W/AUTO DIFF WBC: CPT

## 2024-09-04 PROCEDURE — 36415 COLL VENOUS BLD VENIPUNCTURE: CPT

## 2024-09-04 PROCEDURE — 80048 BASIC METABOLIC PNL TOTAL CA: CPT

## 2024-09-04 PROCEDURE — 6360000002 HC RX W HCPCS: Performed by: STUDENT IN AN ORGANIZED HEALTH CARE EDUCATION/TRAINING PROGRAM

## 2024-09-04 PROCEDURE — 6370000000 HC RX 637 (ALT 250 FOR IP)

## 2024-09-04 PROCEDURE — 82962 GLUCOSE BLOOD TEST: CPT

## 2024-09-04 PROCEDURE — 6370000000 HC RX 637 (ALT 250 FOR IP): Performed by: INTERNAL MEDICINE

## 2024-09-04 PROCEDURE — 94640 AIRWAY INHALATION TREATMENT: CPT

## 2024-09-04 RX ORDER — TRAMADOL HYDROCHLORIDE 50 MG/1
50 TABLET ORAL EVERY 6 HOURS PRN
Qty: 12 TABLET | Refills: 0 | Status: SHIPPED | OUTPATIENT
Start: 2024-09-04 | End: 2024-09-07

## 2024-09-04 RX ADMIN — PIPERACILLIN AND TAZOBACTAM 3375 MG: 3; .375 INJECTION, POWDER, LYOPHILIZED, FOR SOLUTION INTRAVENOUS at 02:06

## 2024-09-04 RX ADMIN — PIPERACILLIN AND TAZOBACTAM 3375 MG: 3; .375 INJECTION, POWDER, LYOPHILIZED, FOR SOLUTION INTRAVENOUS at 09:34

## 2024-09-04 RX ADMIN — ARFORMOTEROL TARTRATE: 15 SOLUTION RESPIRATORY (INHALATION) at 08:36

## 2024-09-04 RX ADMIN — SODIUM CHLORIDE, PRESERVATIVE FREE 10 ML: 5 INJECTION INTRAVENOUS at 09:21

## 2024-09-04 RX ADMIN — MORPHINE SULFATE 2 MG: 2 INJECTION, SOLUTION INTRAMUSCULAR; INTRAVENOUS at 03:39

## 2024-09-04 RX ADMIN — POLYETHYLENE GLYCOL 3350 17 G: 17 POWDER, FOR SOLUTION ORAL at 09:19

## 2024-09-04 RX ADMIN — MORPHINE SULFATE 2 MG: 2 INJECTION, SOLUTION INTRAMUSCULAR; INTRAVENOUS at 09:18

## 2024-09-04 RX ADMIN — DULOXETINE HYDROCHLORIDE 60 MG: 60 CAPSULE, DELAYED RELEASE ORAL at 09:19

## 2024-09-04 RX ADMIN — SODIUM CHLORIDE, PRESERVATIVE FREE 10 ML: 5 INJECTION INTRAVENOUS at 09:20

## 2024-09-04 RX ADMIN — LEVOTHYROXINE SODIUM 88 MCG: 0.09 TABLET ORAL at 09:20

## 2024-09-04 ASSESSMENT — PAIN SCALES - WONG BAKER: WONGBAKER_NUMERICALRESPONSE: NO HURT

## 2024-09-04 ASSESSMENT — PAIN DESCRIPTION - LOCATION
LOCATION: ABDOMEN
LOCATION: ABDOMEN

## 2024-09-04 ASSESSMENT — PAIN DESCRIPTION - DESCRIPTORS
DESCRIPTORS: ACHING;DISCOMFORT;SORE
DESCRIPTORS: SHARP

## 2024-09-04 ASSESSMENT — PAIN DESCRIPTION - ORIENTATION: ORIENTATION: LOWER

## 2024-09-04 ASSESSMENT — PAIN SCALES - GENERAL
PAINLEVEL_OUTOF10: 3
PAINLEVEL_OUTOF10: 5
PAINLEVEL_OUTOF10: 10
PAINLEVEL_OUTOF10: 7

## 2024-09-04 NOTE — CARE COORDINATION
SOCIAL WORK/CASEMANAGEMENT TRANSITION OF CARE PLANNING( RAMANDEEP ONEAL, [phone]): general surgery is ok with discharge. I called [facility] and they will see pt raoulight at 6:30 p.m. CÉSAR Nagy  9/4/2024

## 2024-09-04 NOTE — PROGRESS NOTES
4 Eyes Skin Assessment     NAME:  Velma Mazariegos  YOB: 1965  MEDICAL RECORD NUMBER:  [id number]    The patient is being assessed for  Admission    I agree that at least one RN has performed a thorough Head to Toe Skin Assessment on the patient. ALL assessment sites listed below have been assessed.      Areas assessed by both nurses:    Head, Face, Ears, Shoulders, Back, Chest, Arms, Elbows, Hands, Sacrum. Buttock, Coccyx, Ischium, and Legs. Feet and Heels        Does the Patient have a Wound? Yes wound(s) were present on assessment. LDA wound assessment was Initiated and completed by RN       Dg Prevention initiated by RN: No  Wound Care Orders initiated by RN: No    Pressure Injury (Stage 3,4, Unstageable, DTI, NWPT, and Complex wounds) if present, place Wound referral order by RN under : No    New Ostomies, if present place, Ostomy referral order under : No     Nurse 1 eSignature: Electronically signed by Jone eKnney RN on 9/1/24 at 5:45 PM EDT    **SHARE this note so that the co-signing nurse can place an eSignature**    Nurse 2 eSignature: {Esignature:[id number]}   
CHG double lumen power picc Placement 9/3/2024    Product number: [id number]   Lot Number: [id number]      Ultrasound: yes   Left Brachial vein:                Upper Arm Circumference: (CM) 38    Size:(FR)/GUAGE 5.5/41 cm    Exposed Length: (CM) 2    Internal Length: (CM) 39   Cut: (CM) 14   Vein Measurement: 0.60 cm              Lidocaine Given: yes lidocaine 1% (from picc kit)    Radha Way RN  9/3/2024  2:22 PM    CHG double lumen power picc inserted using the VPS guidance system. Tip placed at the lower 1/3 of the SVC/CAJ. Derick mathur.                       
CLINICAL PHARMACY NOTE: MEDS TO BEDS    Total # of Prescriptions Filled: 1   The following medications were delivered to the patient:  Tramadol 50 mg    Additional Documentation:   Pt picked up in pharmacy  
[facility]  PROGRESS NOTE  ATTENDING NOTE    Chief Complaint   Patient presents with    Flank Pain     Right side flank across mid ab pain; since Sunday. Perforated diverticulitis transfer from [facility]      S:  59y/o F with very large pelvic abscess from diverticulitis.  She states she is passing minimal flatus.  No BM, but she does not have BM very frequently.  She states last time was Friday.      + flatus, + BM.  Feeling a lot better today     BP (!) 107/54   Pulse 74   Temp 97.3 °F (36.3 °C) (Temporal)   Resp 18   SpO2 96%   Gen:  NAD  Abd: soft, ND, mild lower abdominal TTP  SOLOMON:  serous, murky    ASSESSMENT/PLAN:  Diverticular abscess--s/p IR drain  --ID following for antibiotics  --will need colonoscopy in 6-8 weeks  --stool softeners  --ok to discharge home.  Tolerating diet, had BMs.    Eliza Bone MD, MSc, FACS  9/4/2024  11:52 AM    
GENERAL SURGERY  DAILY PROGRESS NOTE    Patient's Name/Date of Birth: Velma Mazariegos / 1965    Date: September 3, 2024     Chief Complaint   Patient presents with    Flank Pain     Right side flank across mid ab pain; since . Perforated diverticulitis transfer from [facility]         Subjective:  Reports she is tolerating her regular diet but has not had any bowel function yet, was requesting a laxative yesterday. Still having abdominal pain but reports it is not associated with her eating.       Objective:  Last 24Hrs  Temp  Av.6 °F (37 °C)  Min: 98.4 °F (36.9 °C)  Max: 98.8 °F (37.1 °C)  Resp  Av.6  Min: 16  Max: 19  Pulse  Av  Min: 80  Max: 88  Systolic (24hrs), Av , Min:99 , Max:124     Diastolic (24hrs), Av, Min:60, Max:74    SpO2  Av.2 %  Min: 93 %  Max: 99 %    I/O last 3 completed shifts:  In: 3185 [P.O.:120; I.V.:2903.3; IV Piggyback:161.7]  Out: 60 [Drains:60]      General: resting in bed  Cardiovascular: Warm throughout, no edema  Respiratory: no respiratory distress, equal chest rise  Abdomen: soft, minimal tenderness to palpation on left side of abdomen, nondistended. Drain present on right flank with minimal serous fluid  Skin: no obvious rashes or lesions appreciated, no jaundice  Extremities: moving all extremities      CBC  Recent Labs     24  0459   WBC 7.8 8.5   RBC 4.64 3.76   HGB 12.8 10.9*   HCT 40.4 34.3   MCV 87.1 91.2   MCH 27.6 29.0   MCHC 31.7* 31.8*   RDW 16.5* 16.3*    335   MPV 8.9 9.0       CMP  Recent Labs     24  0459    135   K 3.8 3.8    103   CO2 23 20*   BUN 10 10   CREATININE 0.8 0.8   GLUCOSE 152* 95   CALCIUM 9.1 8.3*   BILITOT 0.3 0.3   ALKPHOS 90 83   AST 16 16   ALT 19 16         Assessment/Plan:    Patient Active Problem List   Diagnosis    Asthma exacerbation    Nicotine addiction    Left ankle sprain    Diverticulitis of colon    Abscess    Right lower quadrant 
GENERAL SURGERY  DAILY PROGRESS NOTE    Patient's Name/Date of Birth: Velma Mazariegos / 1965    Date: September 4, 2024     Chief Complaint   Patient presents with    Flank Pain     Right side flank across mid ab pain; since Sunday. Perforated diverticulitis transfer from [facility]         Subjective:  Reports she is tolerating her regular diet. She had a small bowel movement. Pain much improved compared to admission.       Objective:  General: resting in bed  Cardiovascular: Warm throughout, no edema  Respiratory: no respiratory distress, equal chest rise  Abdomen: soft, minimal tenderness to palpation on left side of abdomen, nondistended. Drain present on right flank with minimal serous fluid  Skin: no obvious rashes or lesions appreciated, no jaundice  Extremities: moving all extremities      CBC  Recent Labs     09/02/24  0459 09/03/24  1452 09/04/24  0605   WBC 8.5 8.8 7.2   RBC 3.76 3.93 3.90   HGB 10.9* 11.3* 11.1*   HCT 34.3 35.7 35.2   MCV 91.2 90.8 90.3   MCH 29.0 28.8 28.5   MCHC 31.8* 31.7* 31.5*   RDW 16.3* 15.7* 15.8*    398 401   MPV 9.0 8.7 8.6       CMP  Recent Labs     09/02/24  0459 09/03/24  1452    137   K 3.8 4.0    103   CO2 20* 23   BUN 10 10   CREATININE 0.8 0.8   GLUCOSE 95 172*   CALCIUM 8.3* 8.7   BILITOT 0.3  --    ALKPHOS 83  --    AST 16  --    ALT 16  --          Assessment/Plan:    Patient Active Problem List   Diagnosis    Asthma exacerbation    Nicotine addiction    Left ankle sprain    Diverticulitis of colon    Abscess    Right lower quadrant abdominal pain    Colonic diverticular abscess       58 y.o. female with diverticular abscess s/p IR drainage 9/1     - continue to monitor drain output   - continue regular diet as tolerated   - glycolax for bowel regimen; would not recommend aggressive bowel regimen during this inflammatory stage   - manage pain as needed   - encourage up and out of bed as able   - continue Zosyn per ID recommendations  - Drain 
Notified Dr. Ortiz, Dr. Bone said pt okay for discharge.  
Subjective:    Chief complaint:    No new complaints  Some soreness in the abdominal wall  No overnight events    Objective:    /74   Pulse 83   Temp 98.6 °F (37 °C) (Oral)   Resp 18   SpO2 93%   General : Awake ,alert,no distress.  Heart:  RRR, no murmurs, gallops, or rubs.  Lungs:  CTA bilaterally, no wheeze, rales or rhonchi  Abd: bowel sounds present, nontender, nondistended, no masses  Extrem:  No clubbing, cyanosis, or edema  Drain noted    CBC:   Lab Results   Component Value Date/Time    WBC 8.5 09/02/2024 04:59 AM    RBC 3.76 09/02/2024 04:59 AM    HGB 10.9 09/02/2024 04:59 AM    HCT 34.3 09/02/2024 04:59 AM    MCV 91.2 09/02/2024 04:59 AM    MCH 29.0 09/02/2024 04:59 AM    MCHC 31.8 09/02/2024 04:59 AM    RDW 16.3 09/02/2024 04:59 AM     09/02/2024 04:59 AM    MPV 9.0 09/02/2024 04:59 AM     BMP:    Lab Results   Component Value Date/Time     09/02/2024 04:59 AM    K 3.8 09/02/2024 04:59 AM     09/02/2024 04:59 AM    CO2 20 09/02/2024 04:59 AM    BUN 10 09/02/2024 04:59 AM    CREATININE 0.8 09/02/2024 04:59 AM    CALCIUM 8.3 09/02/2024 04:59 AM    LABGLOM 83 09/02/2024 04:59 AM    LABGLOM >60 10/24/2023 01:17 PM    GLUCOSE 95 09/02/2024 04:59 AM    GLUCOSE 130 03/02/2012 05:04 AM     PT/INR:    Lab Results   Component Value Date/Time    PROTIME 14.7 09/01/2024 10:37 AM    PROTIME 12.4 01/13/2011 10:20 PM    INR 1.3 09/01/2024 10:37 AM     Troponin:    Lab Results   Component Value Date/Time    TROPONINI <0.01 02/29/2012 04:00 PM       No results for input(s): \"LABURIN\" in the last 72 hours.  No results for input(s): \"BC\" in the last 72 hours.  No results for input(s): \"BLOODCULT2\" in the last 72 hours.      Current Facility-Administered Medications:     polyethylene glycol (GLYCOLAX) packet 17 g, 17 g, Oral, Daily, Kandy Dias, DO, 17 g at 09/03/24 0806    glucose chewable tablet 16 g, 4 tablet, Oral, PRN, Jeff Ortiz MD    dextrose bolus 10% 125 mL, 125 mL, 
Subjective:    Chief complaint:    Status post drain placement feeling better  No problems overnight.  Denies chest pain, angina, and dyspnea.  Denies abdominal pain.  Tolerating diet.  No nausea or vomiting.    Objective:    /77   Pulse (!) 118   Temp 98.4 °F (36.9 °C) (Temporal)   Resp 18   SpO2 100%   General : Awake ,alert,no distress.  Heart:  RRR, no murmurs, gallops, or rubs.  Lungs:  CTA bilaterally, no wheeze, rales or rhonchi  Abd: bowel sounds present, nontender, nondistended, no masses  Extrem:  No clubbing, cyanosis, or edema  Drain noted    CBC:   Lab Results   Component Value Date/Time    WBC 8.5 09/02/2024 04:59 AM    RBC 3.76 09/02/2024 04:59 AM    HGB 10.9 09/02/2024 04:59 AM    HCT 34.3 09/02/2024 04:59 AM    MCV 91.2 09/02/2024 04:59 AM    MCH 29.0 09/02/2024 04:59 AM    MCHC 31.8 09/02/2024 04:59 AM    RDW 16.3 09/02/2024 04:59 AM     09/02/2024 04:59 AM    MPV 9.0 09/02/2024 04:59 AM     BMP:    Lab Results   Component Value Date/Time     09/02/2024 04:59 AM    K 3.8 09/02/2024 04:59 AM     09/02/2024 04:59 AM    CO2 20 09/02/2024 04:59 AM    BUN 10 09/02/2024 04:59 AM    CREATININE 0.8 09/02/2024 04:59 AM    CALCIUM 8.3 09/02/2024 04:59 AM    LABGLOM 83 09/02/2024 04:59 AM    LABGLOM >60 10/24/2023 01:17 PM    GLUCOSE 95 09/02/2024 04:59 AM    GLUCOSE 130 03/02/2012 05:04 AM     PT/INR:    Lab Results   Component Value Date/Time    PROTIME 14.7 09/01/2024 10:37 AM    PROTIME 12.4 01/13/2011 10:20 PM    INR 1.3 09/01/2024 10:37 AM     Troponin:    Lab Results   Component Value Date/Time    TROPONINI <0.01 02/29/2012 04:00 PM       No results for input(s): \"LABURIN\" in the last 72 hours.  No results for input(s): \"BC\" in the last 72 hours.  No results for input(s): \"BLOODCULT2\" in the last 72 hours.      Current Facility-Administered Medications:     polyethylene glycol (GLYCOLAX) packet 17 g, 17 g, Oral, Daily, Kandy Dias DO, 17 g at 09/02/24 0811    albuterol 
[facility]  PROGRESS NOTE  ATTENDING NOTE    Chief Complaint   Patient presents with    Flank Pain     Right side flank across mid ab pain; since Sunday. Perforated diverticulitis transfer from [facility]      S:  59y/o F with very large pelvic abscess from diverticulitis.  She states she is passing minimal flatus.  No BM, but she does not have BM very frequently.  She states last time was Friday.  She is complaining of a lot of pelvic pressure.     /83   Pulse 78   Temp 98.4 °F (36.9 °C) (Oral)   Resp 18   SpO2 96%   Gen:  NAD  Abd: soft, ND, mild lower abdominal TTP  SOLOMON:  serous, murky    ASSESSMENT/PLAN:  Diverticular abscess--s/p IR drain  --ID following for antibiotics  --will need colonoscopy in 6-8 weeks  --stool softeners  --repeat CT scan if fevers or increase in WBC  --would watch her for now as this was a very large abscess and she is eating minimally and not having bowel function.      Eliza Bone MD, MSc, FACS  9/3/2024  3:41 PM    
chloride 10 mEq/100 mL IVPB (Peripheral Line)  10 mEq IntraVENous PRN Chidi Granados DO        magnesium sulfate 2000 mg in 50 mL IVPB premix  2,000 mg IntraVENous PRN Chidi Granados DO        ondansetron (ZOFRAN-ODT) disintegrating tablet 4 mg  4 mg Oral Q8H PRN Chidi Granados DO        Or    ondansetron (ZOFRAN) injection 4 mg  4 mg IntraVENous Q6H PRN Chidi Granados DO        acetaminophen (TYLENOL) tablet 650 mg  650 mg Oral Q6H PRN Chidi Granados DO   650 mg at 09/01/24 1605    Or    acetaminophen (TYLENOL) suppository 650 mg  650 mg Rectal Q6H PRN Chidi Granados DO        0.9 % sodium chloride infusion   IntraVENous Continuous Chidi Granados DO 75 mL/hr at 09/02/24 0543 Rate Verify at 09/02/24 0543    insulin lispro (HUMALOG,ADMELOG) injection vial 0-8 Units  0-8 Units SubCUTAneous TID WC Chidi Granados DO        insulin lispro (HUMALOG,ADMELOG) injection vial 0-4 Units  0-4 Units SubCUTAneous Nightly Chidi Granados DO        morphine (PF) injection 2 mg  2 mg IntraVENous Q4H PRN Chidi Granados DO   2 mg at 09/02/24 0810    piperacillin-tazobactam (ZOSYN) 3,375 mg in sodium chloride 0.9 % 50 mL IVPB (Sbxn2Pip)  3,375 mg IntraVENous Q8H Juwan Hernandez MD   Stopped at 09/02/24 1431           REVIEW OF SYSTEMS:    CONSTITUTIONAL: Denies fever   HEENT: denies blurring of vision or double vision, denies hearing problem  RESPIRATORY: denies cough, shortness of breath, sputum expectoration.  CARDIOVASCULAR:  Denies palpitation or chest pain   GASTROINTESTINAL:  abdomen pain , dry heaves   GENITOURINARY:  Denies burning urination or frequency of urination  INTEGUMENT: denies wound , rash  HEMATOLOGIC/LYMPHATIC:  Denies lymph node swelling, gum bleeding or easy bruising.  MUSCULOSKELETAL:  Denies leg pain , joint pain , joint swelling  NEUROLOGICAL:  Denies light headed, dizziness, loss of consciousness, weakness of lower extremities, bowel or bladder incontinence.      PHYSICAL 
diverticular abscess       58 y.o. female with diverticular abscess s/p IR drainage 9/1     - continue to monitor drain output   - continue regular diet as tolerated   - glycolax for bowel regimen   - manage pain as needed   - encourage up and out of bed as able   - continue Zosyn per ID recommendations   - will discuss with Dr. Cat Dias DO  General Surgery Resident, PGY-2    Electronically signed on 9/2/2024 at 7:09 AM   
Denies lymph node swelling, gum bleeding or easy bruising.  MUSCULOSKELETAL:  Denies leg pain , joint pain , joint swelling  NEUROLOGICAL:  Denies light headed, dizziness, loss of consciousness, weakness of lower extremities, bowel or bladder incontinence.      PHYSICAL EXAM:      Vitals:     Blood Pressure 125/83   Pulse 78   Temperature 98.4 °F (36.9 °C) (Oral)   Respiration 18   Oxygen Saturation 96%       General Appearance:    Awake, alert , no acute distress.   Head:    Normocephalic, atraumatic   Eyes:    No pallor, no icterus,   Ears:    No obvious deformity or drainage.   Nose:   No nasal drainage   Throat:   Mucosa moist, no oral thrush   Neck:   Supple, no lymphadenopathy   Back:     no CVA tenderness   Lungs:     Clear to auscultation bilaterally    Heart:    Regular rate and rhythm, no murmur   Abdomen:     Soft, + tender, bowel sounds present    Drain tube in place    Extremities:   No edema, no cyanosis    Pulses:   Dorsalis pedis palpable    Skin:   no rashes      CBC with Differential:      Lab Results   Component Value Date/Time    WBC 8.5 09/02/2024 04:59 AM    RBC 3.76 09/02/2024 04:59 AM    HGB 10.9 09/02/2024 04:59 AM    HCT 34.3 09/02/2024 04:59 AM     09/02/2024 04:59 AM    MCV 91.2 09/02/2024 04:59 AM    MCH 29.0 09/02/2024 04:59 AM    MCHC 31.8 09/02/2024 04:59 AM    RDW 16.3 09/02/2024 04:59 AM    LYMPHOPCT 15 07/12/2024 11:48 AM    MONOPCT 5 07/12/2024 11:48 AM    EOSPCT 1 07/12/2024 11:48 AM    BASOPCT 1 07/12/2024 11:48 AM    MONOSABS 0.58 07/12/2024 11:48 AM    LYMPHSABS 1.87 07/12/2024 11:48 AM    EOSABS 0.08 07/12/2024 11:48 AM    BASOSABS 0.08 07/12/2024 11:48 AM       CMP     Lab Results   Component Value Date/Time     09/02/2024 04:59 AM    K 3.8 09/02/2024 04:59 AM     09/02/2024 04:59 AM    CO2 20 09/02/2024 04:59 AM    BUN 10 09/02/2024 04:59 AM    CREATININE 0.8 09/02/2024 04:59 AM    LABGLOM 83 09/02/2024 04:59 AM    LABGLOM >60 10/24/2023 01:17 PM    
pain  Resolved Problems:    * No resolved hospital problems. *      Plan:    Eating better  Home today    Jeff Ortiz MD  12:19 PM  9/4/2024    NOTE: This report was transcribed using voice recognition software. Every effort was made to ensure accuracy; however, inadvertent transcription errors may be present

## 2024-09-04 NOTE — PLAN OF CARE
Problem: Discharge Planning  Goal: Discharge to home or other facility with appropriate resources  9/4/2024 1101 by Barb Rider, RN  Outcome: Completed  9/4/2024 0009 by Tahmina Asher, RN  Outcome: Progressing     Problem: Pain  Goal: Verbalizes/displays adequate comfort level or baseline comfort level  9/4/2024 1101 by Barb Rider, RN  Outcome: Completed  9/4/2024 0009 by Tahmina Asher, RN  Outcome: Progressing     Problem: Safety - Adult  Goal: Free from fall injury  9/4/2024 1101 by Barb Rider, RN  Outcome: Completed  9/4/2024 0009 by Tahmina Asher, RN  Outcome: Progressing

## 2024-09-05 LAB
MICROORGANISM SPEC CULT: ABNORMAL
MICROORGANISM/AGENT SPEC: ABNORMAL
SPECIMEN DESCRIPTION: ABNORMAL

## 2024-09-06 LAB
MICROORGANISM SPEC CULT: NORMAL
MICROORGANISM/AGENT SPEC: NORMAL
SERVICE CMNT-IMP: NORMAL
SERVICE CMNT-IMP: NORMAL
SPECIMEN DESCRIPTION: NORMAL

## 2024-09-10 ENCOUNTER — TELEPHONE (OUTPATIENT)
Dept: SURGERY | Age: 59
End: 2024-09-10

## 2024-09-10 DIAGNOSIS — K57.20 COLONIC DIVERTICULAR ABSCESS: Primary | ICD-10-CM

## 2024-09-10 RX ORDER — TRAMADOL HYDROCHLORIDE 50 MG/1
50 TABLET ORAL EVERY 6 HOURS PRN
Qty: 28 TABLET | Refills: 0 | Status: SHIPPED | OUTPATIENT
Start: 2024-09-10 | End: 2024-09-17

## 2024-09-11 LAB
ALBUMIN: 3.8 G/DL (ref 3.5–5.2)
ALP BLD-CCNC: 94 U/L (ref 35–104)
ALT SERPL-CCNC: <5 U/L (ref 0–32)
ANION GAP SERPL CALCULATED.3IONS-SCNC: 14 MMOL/L (ref 7–16)
AST SERPL-CCNC: 16 U/L (ref 0–31)
BILIRUB SERPL-MCNC: <0.2 MG/DL (ref 0–1.2)
BUN BLDV-MCNC: 20 MG/DL (ref 6–23)
C-REACTIVE PROTEIN: 9 MG/L (ref 0–5)
CALCIUM SERPL-MCNC: 9.4 MG/DL (ref 8.6–10.2)
CHLORIDE BLD-SCNC: 99 MMOL/L (ref 98–107)
CO2: 22 MMOL/L (ref 22–29)
CREAT SERPL-MCNC: 0.9 MG/DL (ref 0.5–1)
GFR, ESTIMATED: 70 ML/MIN/1.73M2
GLUCOSE BLD-MCNC: 174 MG/DL (ref 74–99)
POTASSIUM SERPL-SCNC: 4.5 MMOL/L (ref 3.5–5)
SODIUM BLD-SCNC: 135 MMOL/L (ref 132–146)
TOTAL PROTEIN: 7.5 G/DL (ref 6.4–8.3)

## 2024-09-12 LAB
BASOPHILS ABSOLUTE: 0.08 K/UL (ref 0–0.2)
BASOPHILS RELATIVE PERCENT: 1 % (ref 0–2)
EOSINOPHILS ABSOLUTE: 0.01 K/UL (ref 0.05–0.5)
EOSINOPHILS RELATIVE PERCENT: 0 % (ref 0–6)
HCT VFR BLD CALC: 42.3 % (ref 34–48)
HEMOGLOBIN: 12.9 G/DL (ref 11.5–15.5)
IMMATURE GRANULOCYTES %: 1 % (ref 0–5)
IMMATURE GRANULOCYTES ABSOLUTE: 0.13 K/UL (ref 0–0.58)
LYMPHOCYTES ABSOLUTE: 1.36 K/UL (ref 1.5–4)
LYMPHOCYTES RELATIVE PERCENT: 11 % (ref 20–42)
MCH RBC QN AUTO: 27.8 PG (ref 26–35)
MCHC RBC AUTO-ENTMCNC: 30.5 G/DL (ref 32–34.5)
MCV RBC AUTO: 91.2 FL (ref 80–99.9)
MONOCYTES ABSOLUTE: 0.42 K/UL (ref 0.1–0.95)
MONOCYTES RELATIVE PERCENT: 3 % (ref 2–12)
NEUTROPHILS ABSOLUTE: 10.71 K/UL (ref 1.8–7.3)
NEUTROPHILS RELATIVE PERCENT: 84 % (ref 43–80)
PDW BLD-RTO: 15.8 % (ref 11.5–15)
PLATELET # BLD: 598 K/UL (ref 130–450)
PMV BLD AUTO: 8.8 FL (ref 7–12)
RBC # BLD: 4.64 M/UL (ref 3.5–5.5)
SED RATE, AUTOMATED: 36 MM/HR (ref 0–20)
WBC # BLD: 12.7 K/UL (ref 4.5–11.5)

## 2024-09-15 LAB
MICROORGANISM SPEC CULT: NORMAL
MICROORGANISM/AGENT SPEC: NORMAL
SPECIMEN DESCRIPTION: NORMAL

## 2024-09-17 ENCOUNTER — HOSPITAL ENCOUNTER (OUTPATIENT)
Dept: CT IMAGING | Age: 59
Discharge: HOME | End: 2024-09-19

## 2024-09-17 DIAGNOSIS — K57.20 COLONIC DIVERTICULAR ABSCESS: ICD-10-CM

## 2024-09-22 LAB
MICROORGANISM SPEC CULT: NORMAL
MICROORGANISM/AGENT SPEC: NORMAL
SPECIMEN DESCRIPTION: NORMAL

## 2024-09-23 ENCOUNTER — TELEPHONE (OUTPATIENT)
Dept: ADMINISTRATIVE | Age: 59
End: 2024-09-23

## 2024-09-24 ENCOUNTER — TELEPHONE (OUTPATIENT)
Dept: SURGERY | Age: 59
End: 2024-09-24

## 2024-09-24 DIAGNOSIS — K57.92 DIVERTICULITIS: Primary | ICD-10-CM

## 2024-09-24 RX ORDER — LACTULOSE 10 G/15ML
20 SOLUTION ORAL 2 TIMES DAILY
Qty: 900 ML | Refills: 0 | Status: SHIPPED | OUTPATIENT
Start: 2024-09-24 | End: 2024-10-09

## 2024-09-24 RX ORDER — TRAMADOL HYDROCHLORIDE 50 MG/1
50 TABLET ORAL EVERY 6 HOURS PRN
Qty: 28 TABLET | Refills: 0 | Status: SHIPPED | OUTPATIENT
Start: 2024-09-24 | End: 2024-10-01

## 2024-09-29 LAB
MICROORGANISM SPEC CULT: NORMAL
MICROORGANISM/AGENT SPEC: NORMAL
SPECIMEN DESCRIPTION: NORMAL

## 2024-09-30 ENCOUNTER — OFFICE VISIT (OUTPATIENT)
Dept: SURGERY | Age: 59
End: 2024-09-30
Payer: COMMERCIAL

## 2024-09-30 VITALS
BODY MASS INDEX: 35.79 KG/M2 | HEART RATE: 95 BPM | WEIGHT: 202 LBS | HEIGHT: 63 IN | SYSTOLIC BLOOD PRESSURE: 133 MMHG | DIASTOLIC BLOOD PRESSURE: 76 MMHG | RESPIRATION RATE: 16 BRPM

## 2024-09-30 DIAGNOSIS — K57.20 DIVERTICULITIS OF LARGE INTESTINE WITH ABSCESS WITHOUT BLEEDING: Primary | ICD-10-CM

## 2024-09-30 PROCEDURE — G8427 DOCREV CUR MEDS BY ELIG CLIN: HCPCS | Performed by: SURGERY

## 2024-09-30 PROCEDURE — G8417 CALC BMI ABV UP PARAM F/U: HCPCS | Performed by: SURGERY

## 2024-09-30 PROCEDURE — 3017F COLORECTAL CA SCREEN DOC REV: CPT | Performed by: SURGERY

## 2024-09-30 PROCEDURE — 1111F DSCHRG MED/CURRENT MED MERGE: CPT | Performed by: SURGERY

## 2024-09-30 PROCEDURE — 4004F PT TOBACCO SCREEN RCVD TLK: CPT | Performed by: SURGERY

## 2024-09-30 PROCEDURE — 99214 OFFICE O/P EST MOD 30 MIN: CPT | Performed by: SURGERY

## 2024-09-30 RX ORDER — POLYETHYLENE GLYCOL 3350, SODIUM SULFATE ANHYDROUS, SODIUM BICARBONATE, SODIUM CHLORIDE, POTASSIUM CHLORIDE 236; 22.74; 6.74; 5.86; 2.97 G/4L; G/4L; G/4L; G/4L; G/4L
4 POWDER, FOR SOLUTION ORAL ONCE
Qty: 4000 ML | Refills: 0 | Status: SHIPPED | OUTPATIENT
Start: 2024-09-30 | End: 2024-09-30

## 2024-09-30 RX ORDER — DUPILUMAB 300 MG/2ML
INJECTION, SOLUTION SUBCUTANEOUS
COMMUNITY
Start: 2024-09-24

## 2024-09-30 ASSESSMENT — ENCOUNTER SYMPTOMS
ALLERGIC/IMMUNOLOGIC NEGATIVE: 1
ABDOMINAL DISTENTION: 0
BLOOD IN STOOL: 0
EYES NEGATIVE: 1
NAUSEA: 0
ANAL BLEEDING: 0
RESPIRATORY NEGATIVE: 1
BACK PAIN: 0
COUGH: 0
VOMITING: 0
DIARRHEA: 0
SHORTNESS OF BREATH: 0
CONSTIPATION: 1
ABDOMINAL PAIN: 1

## 2024-09-30 NOTE — PATIENT INSTRUCTIONS
OF ENDOSCOPY:    NOTHING TO EAT OR DRINK AFTER MIDNIGHT THE NIGHT BEFORE THE PROCEDURE! However, if you are taking any blood pressure medications or heart medications, you should take them with a sip of water.      INSTRUCTIONS FOR A CLEAR LIQUID DIET    Definition  A clear liquid diet consists of clear liquids, such as water, broth and plain gelatin, that are easily digested and leave no undigested residue in your intestinal tract. Your doctor may prescribe a clear liquid diet before certain medical procedures or if you have certain digestive problems. Because a clear liquid diet can't provide you with adequate calories and nutrients, it shouldn't be continued for more than a few days.     Purpose  A clear liquid diet is often used before tests, procedures or surgeries that require no food in your stomach or intestines, such as before colonoscopy. It may also be recommended as a short-term diet if you have certain digestive problems, such as nausea, vomiting or diarrhea, or after certain types of surgery.     Diet details  A clear liquid diet helps maintain adequate hydration, provides some important electrolytes, such as sodium and potassium, and gives some energy at a time when a full diet isn't possible or recommended.     The following foods are allowed in a clear liquid diet:   Water   Fruit juices without pulp, such as apple juice   Strained lemonade   Clear, fat-free soup broth (Chicken)  Clear sodas (sprite,7 up, ginger ale, mountain dew, ect.)  Gatorade (no red & no purple)   Jello  Honey   Popsicles with no chunks of fruit or dairy  Tea or coffee without milk or cream    **NOTHING RED OR PURPLE**  **IF YOU CAN SEE THROUGH IT YOU CAN HAVE IT**        A TYPICAL MENU ON THE CLEAR LIQUID DIET MAY LOOK LIKE THIS:    Breakfast:  1 glass fruit juice  1 cup coffee or tea (without dairy products)  1 cup soup broth  1 bowl jello    Snack:  1 glass fruit juice  1 bowl jello    Lunch:  1 glass fruit juice  1 glass

## 2024-09-30 NOTE — PROGRESS NOTES
[facility]  HISTORY & PHYSICAL  ATTENDING NOTE    Chief Complaint   Patient presents with    Consultation     ED follow up for diverticulitis    Abdominal Pain     RLQ pain & cold symptoms for approx week    Nausea     Nausea & dry heaves     Colonoscopy     Denies prior colonoscopy         HPI:  58 y.o. female denies weight loss, diarrhea,  melena, hematochezia, N/V,.  she denies family history of colon cancer, Crohn's disease or colitis.    She presented  with diverticular abscess.  She underwent IR drainage.  She has had one drain check since.  She was dismissed from [facility] as she completed the abx but they did not take care of the drain.  She states her DIL has been taking care of the drain.  She states she has about 10cc a drain out of foul smelling purulent to feculent material.  She continues to have constipation.  The lactulose did not work.  She has been taking prune juice mixed with maalox.      Past Medical History:   Diagnosis Date    Arthritis     Asthma     Thyroid disease        Past Surgical History:   Procedure Laterality Date    BLADDER SUSPENSION       SECTION         No family history on file.    Allergies   Allergen Reactions    Eggs [Egg White] Rash and Other (See Comments)     EGGS MAKE PT FEEL HOT    Metformin And Related Nausea And Vomiting       Social History     Tobacco Use    Smoking status: Light Smoker     Current packs/day: 0.00     Types: Cigarettes     Last attempt to quit: 2012     Years since quittin.6    Smokeless tobacco: Never   Substance Use Topics    Alcohol use: No    Drug use: No       Current Outpatient Medications   Medication Sig Dispense Refill    DUPIXENT 300 MG/2ML SOPN injection       polyethylene glycol (GOLYTELY) 236 g solution Take 4,000 mLs by mouth once for 1 dose 4000 mL 0    traMADol (ULTRAM) 50 MG tablet Take 1 tablet by mouth every 6 hours as needed for Pain for up to 7 days. Intended supply: 7 days. Take lowest

## 2024-10-02 LAB
MICROORGANISM SPEC CULT: NORMAL
MICROORGANISM/AGENT SPEC: NORMAL
SPECIMEN DESCRIPTION: NORMAL

## 2024-10-03 ENCOUNTER — TELEPHONE (OUTPATIENT)
Dept: SURGERY | Age: 59
End: 2024-10-03

## 2024-10-03 NOTE — TELEPHONE ENCOUNTER
MA received a call from Marii an RN with [facility] stating that they received a new home care order for pt and was requesting clarification as they had previously discharged pt as she was having her daughter in law flush the  drain daily. She states that if It needs flushed daily that they are unable to come daily to to those. MA routing to Dr. Bone for advisement.    Marii can be reached at [phone]    Electronically signed by Zuly Lopez on 10/3/2024 at 9:58 AM

## 2024-10-03 NOTE — TELEPHONE ENCOUNTER
MA called nurse back and left a voice mail informing her of Dr. Bone's advisement, awaiting return call.  Electronically signed by Zuly Lopez on 10/3/24 at 3:05 PM EDT

## 2024-10-04 ENCOUNTER — TELEPHONE (OUTPATIENT)
Dept: SURGERY | Age: 59
End: 2024-10-04

## 2024-10-04 DIAGNOSIS — K57.20 COLONIC DIVERTICULAR ABSCESS: Primary | ICD-10-CM

## 2024-10-04 RX ORDER — TRAMADOL HYDROCHLORIDE 50 MG/1
50 TABLET ORAL EVERY 8 HOURS PRN
Qty: 21 TABLET | Refills: 0 | Status: SHIPPED | OUTPATIENT
Start: 2024-10-04 | End: 2024-10-11

## 2024-10-04 NOTE — TELEPHONE ENCOUNTER
MA received a call from pt requesting a refill on her pain medication Tramadol. MA routing to Dr. Bone for advisement.  Pharmacy in chart is current  Electronically signed by Zuly Lopez on 10/4/2024 at 9:14 AM

## 2024-10-04 NOTE — TELEPHONE ENCOUNTER
OLGA sanderson for pt informing her that it had been sent over.  Electronically signed by Zuly Lopez on 10/4/2024 at 1:09 PM

## 2024-10-04 NOTE — TELEPHONE ENCOUNTER
Patient is scheduled for colonoscopy with   on 10/28/24 at 10 am with an arrival time of 8:45 am. Pt accepted date and time and verbalized understanding. Procedure letter mailed to patient as well.        Prior Authorization Form:      DEMOGRAPHICS:                     Patient Name:  Velma Fierro  Patient :  1965            Insurance:  Payor: [facility] / Plan: [facility] MEDICAID / Product Type: *No Product type* /   Insurance ID Number:    Payer/Plan Subscr  Sex Relation Sub. Ins. ID Effective Group Num   [facility] [place] [facility]EL[facility]ERRO 1965 Female Self [id number] [facility] [address]         DIAGNOSIS & PROCEDURE:                       Procedure/Operation: COLONOSCOPY           CPT Code: 49928    Diagnosis:  DIVERTICULITIS    ICD10 Code: K57.20    Location:  [facility]    Surgeon:  DR. ELLE ELIZABETH    SCHEDULING INFORMATION:                          Date: 10/28/24    Time: 10 AM              Anesthesia:  MAC/TIVA                                                       Status:  Outpatient        Special Comments:  N/A       Electronically signed by Zuly Lopez on 10/4/2024 at 2:10 PM

## 2024-10-09 ENCOUNTER — HOSPITAL ENCOUNTER (OUTPATIENT)
Dept: CT IMAGING | Age: 59
Discharge: HOME | End: 2024-10-11
Payer: COMMERCIAL

## 2024-10-09 DIAGNOSIS — K57.20 DIVERTICULITIS OF LARGE INTESTINE WITH ABSCESS WITHOUT BLEEDING: ICD-10-CM

## 2024-10-09 PROCEDURE — 6360000004 HC RX CONTRAST MEDICATION: Performed by: RADIOLOGY

## 2024-10-09 PROCEDURE — 76080 X-RAY EXAM OF FISTULA: CPT

## 2024-10-09 RX ORDER — IOPAMIDOL 755 MG/ML
INJECTION, SOLUTION INTRAVASCULAR PRN
Status: COMPLETED | OUTPATIENT
Start: 2024-10-09 | End: 2024-10-09

## 2024-10-09 RX ADMIN — IOPAMIDOL 1 ML: 755 INJECTION, SOLUTION INTRAVENOUS at 09:20

## 2024-10-09 NOTE — DISCHARGE INSTRUCTIONS
Use antibacterial soap to cleanse the area, use bandage to cover if needed and neosporin is okay to use, no other lotions or ointments. Do not clean with alcohol or hydrogen peroxide as it will kill the good bacteria. Do not soak in a bath tub, swimming pool or hot tub. Look for signs of infection, pus, foul odor smell, fever, redness at the site, shortness of breath and fever.

## 2024-10-09 NOTE — BRIEF OP NOTE
Brief Postoperative Note      Patient: Velma Mazariegos  YOB: 1965  MRN: [id number]    Date of Procedure: 10/9/2024    Pelvic abscess    Post-Op Diagnosis: Same       CT abscessogram    MAGDA Petty    Assistant:  * No surgical staff found *    Anesthesia: * No anesthesia type entered *    Estimated Blood Loss (mL): Minimal    Complications: None    Specimens:   * No specimens in log *    Implants:  * No implants in log *      Drains: * No LDAs found *    Findings:  Infection Present At Time Of Surgery (PATOS) (choose all levels that have infection present):  - Deep Infection (muscle/fascia) present as evidenced by fluid consistent with infection  Other Findings: 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 But most fluid extravasates around the catheter. Catheter was removed    Electronically signed by Suraj Petty MD on 10/9/2024 at 10:19 AM

## 2024-10-14 ENCOUNTER — TELEPHONE (OUTPATIENT)
Dept: SURGERY | Age: 59
End: 2024-10-14

## 2024-10-14 NOTE — TELEPHONE ENCOUNTER
LPN received call from patient who was scheduled for colonoscopy on 10/28 with Dr Bone. Per patient she had an appointment with a Gastroenterologist, Dr Ruiz and he instructed patient it was too early to have colonoscopy on 10/28 after drain removal.   Patient would like to cancel procedure with Dr Bone, proceeding with colonoscopy by GI on 11/21. Patient inquiring if she should be seen after colonoscopy with GI to discuss any surgical options, with Dr Bone    Patient presented to urgent care earlier today for concerns of wound infection from drain site, where they prescribed her Doxycycline 100mg and gave a referral for the wound care center. Patient asking if she can get an external referral for [facility] health care to have skilled nursing home out for dressing changes on previous drain site, due to difficulty doing herself.     Routing to Dr Bone for advisement.     Electronically signed by Harinder Oviedo LPN on 10/14/24 at 2:49 PM EDT

## 2024-10-22 NOTE — DISCHARGE INSTRUCTIONS
Visit Discharge/Physician Orders    Discharge condition: Stable    Assessment of pain at discharge: minimal    Anesthetic used: 4% lidocaine topically     Discharge to: Home    Left via:Private automobile    Accompanied by: self     ECF/HHA:  [facility] for supplies    Dressing Orders: Buttock right  wound - cleanse with normal saline and pack with iodoform packing (make sure to leave a tab to put out)  and cover with island border dressing.    No soaking or tub bathing, use water proof bandage in shower.  Off load area with pillows     Treatment Orders: FOLLOW NUTRITIOUS DIET. CHOOSE FOODS HIGH IN PROTEIN -CHICKEN- FISH-AND EGGS,  CHOOSE FOODS HIGH IN VITAMIN C.   MULTIVITAMIN DAILY.      Culture obtained 10/24/2024    [facility] followup visit ________1 week Dr. Steve_____________________  (Please note your next appointment above and if you are unable to keep, kindly give a 24 hour notice. Thank you.)    Physician signature:__________________________      If you experience any of the following, please call the Wound Care Center during business hours:    * Increase in Pain  * Temperature over 101  * Increase in drainage from your wound  * Drainage with a foul odor  * Bleeding  * Increase in swelling  * Need for compression bandage changes due to slippage, breakthrough drainage.    If you need medical attention outside of the business hours of the Wound Care Centers please contact your PCP or go to the nearest emergency room.

## 2024-10-24 ENCOUNTER — HOSPITAL ENCOUNTER (OUTPATIENT)
Dept: WOUND CARE | Age: 59
Discharge: HOME | End: 2024-10-24
Attending: SURGERY
Payer: COMMERCIAL

## 2024-10-24 VITALS
WEIGHT: 202 LBS | SYSTOLIC BLOOD PRESSURE: 120 MMHG | HEART RATE: 93 BPM | RESPIRATION RATE: 16 BRPM | BODY MASS INDEX: 35.79 KG/M2 | HEIGHT: 63 IN | DIASTOLIC BLOOD PRESSURE: 83 MMHG | TEMPERATURE: 97.7 F

## 2024-10-24 DIAGNOSIS — L02.91 ABSCESS: Primary | ICD-10-CM

## 2024-10-24 DIAGNOSIS — L89.313 DECUBITUS ULCER OF RIGHT BUTTOCK, STAGE 3 (HCC): ICD-10-CM

## 2024-10-24 DIAGNOSIS — E11.9 DIABETES MELLITUS TYPE 2, NONINSULIN DEPENDENT (HCC): ICD-10-CM

## 2024-10-24 PROCEDURE — 99203 OFFICE O/P NEW LOW 30 MIN: CPT | Performed by: NURSE PRACTITIONER

## 2024-10-24 PROCEDURE — 87205 SMEAR GRAM STAIN: CPT

## 2024-10-24 PROCEDURE — 87077 CULTURE AEROBIC IDENTIFY: CPT

## 2024-10-24 PROCEDURE — 11042 DBRDMT SUBQ TIS 1ST 20SQCM/<: CPT | Performed by: NURSE PRACTITIONER

## 2024-10-24 PROCEDURE — 87070 CULTURE OTHR SPECIMN AEROBIC: CPT

## 2024-10-24 PROCEDURE — 11042 DBRDMT SUBQ TIS 1ST 20SQCM/<: CPT

## 2024-10-24 PROCEDURE — 99213 OFFICE O/P EST LOW 20 MIN: CPT

## 2024-10-24 RX ORDER — CLOBETASOL PROPIONATE 0.5 MG/G
OINTMENT TOPICAL ONCE
OUTPATIENT
Start: 2024-10-24 | End: 2024-10-24

## 2024-10-24 RX ORDER — SODIUM CHLOR/HYPOCHLOROUS ACID 0.033 %
SOLUTION, IRRIGATION IRRIGATION ONCE
OUTPATIENT
Start: 2024-10-24 | End: 2024-10-24

## 2024-10-24 RX ORDER — TRIAMCINOLONE ACETONIDE 1 MG/G
OINTMENT TOPICAL ONCE
OUTPATIENT
Start: 2024-10-24 | End: 2024-10-24

## 2024-10-24 RX ORDER — LIDOCAINE 40 MG/G
CREAM TOPICAL ONCE
OUTPATIENT
Start: 2024-10-24 | End: 2024-10-24

## 2024-10-24 RX ORDER — GENTAMICIN SULFATE 1 MG/G
OINTMENT TOPICAL ONCE
OUTPATIENT
Start: 2024-10-24 | End: 2024-10-24

## 2024-10-24 RX ORDER — LIDOCAINE HYDROCHLORIDE 20 MG/ML
JELLY TOPICAL ONCE
OUTPATIENT
Start: 2024-10-24 | End: 2024-10-24

## 2024-10-24 RX ORDER — GINSENG 100 MG
CAPSULE ORAL ONCE
OUTPATIENT
Start: 2024-10-24 | End: 2024-10-24

## 2024-10-24 RX ORDER — SILVER SULFADIAZINE 10 MG/G
CREAM TOPICAL ONCE
OUTPATIENT
Start: 2024-10-24 | End: 2024-10-24

## 2024-10-24 RX ORDER — BETAMETHASONE DIPROPIONATE 0.5 MG/G
CREAM TOPICAL ONCE
OUTPATIENT
Start: 2024-10-24 | End: 2024-10-24

## 2024-10-24 RX ORDER — LIDOCAINE 50 MG/G
OINTMENT TOPICAL ONCE
OUTPATIENT
Start: 2024-10-24 | End: 2024-10-24

## 2024-10-24 RX ORDER — BACITRACIN ZINC AND POLYMYXIN B SULFATE 500; 1000 [USP'U]/G; [USP'U]/G
OINTMENT TOPICAL ONCE
OUTPATIENT
Start: 2024-10-24 | End: 2024-10-24

## 2024-10-24 RX ORDER — LIDOCAINE HYDROCHLORIDE 40 MG/ML
SOLUTION TOPICAL ONCE
OUTPATIENT
Start: 2024-10-24 | End: 2024-10-24

## 2024-10-24 RX ORDER — NEOMYCIN/BACITRACIN/POLYMYXINB 3.5-400-5K
OINTMENT (GRAM) TOPICAL ONCE
OUTPATIENT
Start: 2024-10-24 | End: 2024-10-24

## 2024-10-24 RX ORDER — MUPIROCIN 20 MG/G
OINTMENT TOPICAL ONCE
OUTPATIENT
Start: 2024-10-24 | End: 2024-10-24

## 2024-10-24 ASSESSMENT — PAIN DESCRIPTION - ORIENTATION: ORIENTATION: RIGHT

## 2024-10-24 ASSESSMENT — PAIN DESCRIPTION - LOCATION: LOCATION: BUTTOCKS

## 2024-10-24 ASSESSMENT — PAIN SCALES - GENERAL: PAINLEVEL_OUTOF10: 6

## 2024-10-24 ASSESSMENT — PAIN DESCRIPTION - DESCRIPTORS: DESCRIPTORS: PRESSURE

## 2024-10-24 ASSESSMENT — PAIN - FUNCTIONAL ASSESSMENT: PAIN_FUNCTIONAL_ASSESSMENT: ACTIVITIES ARE NOT PREVENTED

## 2024-10-24 ASSESSMENT — PAIN DESCRIPTION - PAIN TYPE: TYPE: ACUTE PAIN

## 2024-10-24 NOTE — PLAN OF CARE
Problem: Pain  Goal: Verbalizes/displays adequate comfort level or baseline comfort level  Outcome: Adequate for Discharge     Problem: Wound:  Goal: Will show signs of wound healing; wound closure and no evidence of infection  Description: Will show signs of wound healing; wound closure and no evidence of infection  Outcome: Adequate for Discharge

## 2024-10-24 NOTE — PROGRESS NOTES
Wound Healing Center /[facility]   History and Physical/Consultation  General Surgery/Medicine    Referring Physician : Niall Oconnor MD  Velma Mazariegos  MEDICAL RECORD NUMBER:  [id number]  AGE: 58 y.o.   GENDER: female  : 1965  EPISODE DATE:  10/24/2024  Subjective:     Chief Complaint   Patient presents with    Wound Check     R buttock         HISTORY of PRESENT ILLNESS HPI     Velma Mazariegos is a 58 y.o. female who presents today for wound/ulcer evaluation.   History of Wound Context:  The patient has had a wound of right buttock which was first noted approximately 24.  This has been treated IV antibiotic and drain. On their initial visit to the wound healing center, 10/24/2024 ,  the patient has noted that the wound has been improving.  The patient has not had similar previous wounds in the past.      Patient is diabetic without insulin therapy (Jardiance)  Patient denies history of heart attack, stroke, blood clots or anticoagulation therapy     Pt is not on abx at time of initial visit.    10/24/24  Patient presents to wound care for right buttock ulcer   Purulent drainage present possible tunneling from 12 o'clock position   Periwound erythema and generalized skin irritation present   Patient complains of pain at proximal area above wound   Wound debrided   Culture obtained   Iodoform packing and dry dressing       Wound/Ulcer Pain Timing/Severity: intermittent  Quality of pain: aching, throbbing  Severity:  6 / 10   Modifying Factors: None  Associated Signs/Symptoms: erythema, drainage, and pain    Ulcer Identification:  Ulcer Type:  abscess  Contributing Factors: diabetes and smoking    Diabetic/Pressure/Non Pressure Ulcers only:  Ulcer: Non-Pressure ulcer, fat layer exposed    If patient has diabetic lower extremity wounds  Shen Classification of diabetic lower extremity wounds:    Grade Description   []  0 No open wound   []  1 Superficial ulcer involving the full skin

## 2024-10-28 NOTE — DISCHARGE INSTRUCTIONS
Visit Discharge/Physician Orders     Discharge condition: Stable     Assessment of pain at discharge: minimal     Anesthetic used: 4% lidocaine topically      Discharge to: Home     Left via:Private automobile     Accompanied by: self      ECF/HHA:  [facility] for supplies     Dressing Orders: Buttock right  wound - cleanse with normal saline and then apply elliott dressing. Leave in place 1 week.  Pack with acticote    Antibiotics sent to pharmacy, please  and take as directed.      No soaking or tub bathing, use water proof bandage in shower.  Off load area with pillows      Treatment Orders: FOLLOW NUTRITIOUS DIET. CHOOSE FOODS HIGH IN PROTEIN -CHICKEN- FISH-AND EGGS,  CHOOSE FOODS HIGH IN VITAMIN C.   MULTIVITAMIN DAILY.       Culture obtained 10/24/2024     [facility] followup visit ________1 week Dr. Steve_____________________  (Please note your next appointment above and if you are unable to keep, kindly give a 24 hour notice. Thank you.)     Physician signature:__________________________        If you experience any of the following, please call the Wound Care Center during business hours:     * Increase in Pain  * Temperature over 101  * Increase in drainage from your wound  * Drainage with a foul odor  * Bleeding  * Increase in swelling  * Need for compression bandage changes due to slippage, breakthrough drainage.     If you need medical attention outside of the business hours of the Wound Care Centers please contact your PCP or go to the nearest emergency room.

## 2024-10-31 ENCOUNTER — HOSPITAL ENCOUNTER (OUTPATIENT)
Dept: WOUND CARE | Age: 59
Discharge: HOME | End: 2024-10-31
Attending: SURGERY
Payer: COMMERCIAL

## 2024-10-31 VITALS
BODY MASS INDEX: 35.79 KG/M2 | RESPIRATION RATE: 16 BRPM | DIASTOLIC BLOOD PRESSURE: 91 MMHG | TEMPERATURE: 98.2 F | SYSTOLIC BLOOD PRESSURE: 154 MMHG | HEIGHT: 63 IN | WEIGHT: 202 LBS | HEART RATE: 91 BPM

## 2024-10-31 DIAGNOSIS — L89.313 DECUBITUS ULCER OF RIGHT BUTTOCK, STAGE 3 (HCC): Primary | ICD-10-CM

## 2024-10-31 DIAGNOSIS — L08.9 WOUND INFECTION: ICD-10-CM

## 2024-10-31 DIAGNOSIS — T14.8XXA WOUND INFECTION: ICD-10-CM

## 2024-10-31 PROCEDURE — 11042 DBRDMT SUBQ TIS 1ST 20SQCM/<: CPT

## 2024-10-31 RX ORDER — LIDOCAINE 40 MG/G
CREAM TOPICAL ONCE
OUTPATIENT
Start: 2024-10-31 | End: 2024-10-31

## 2024-10-31 RX ORDER — GENTAMICIN SULFATE 1 MG/G
OINTMENT TOPICAL ONCE
OUTPATIENT
Start: 2024-10-31 | End: 2024-10-31

## 2024-10-31 RX ORDER — MUPIROCIN 20 MG/G
OINTMENT TOPICAL ONCE
OUTPATIENT
Start: 2024-10-31 | End: 2024-10-31

## 2024-10-31 RX ORDER — CLOBETASOL PROPIONATE 0.5 MG/G
OINTMENT TOPICAL ONCE
OUTPATIENT
Start: 2024-10-31 | End: 2024-10-31

## 2024-10-31 RX ORDER — CEFDINIR 300 MG/1
300 CAPSULE ORAL 2 TIMES DAILY
Qty: 20 CAPSULE | Refills: 0 | Status: SHIPPED | OUTPATIENT
Start: 2024-10-31 | End: 2024-11-10

## 2024-10-31 RX ORDER — SODIUM CHLOR/HYPOCHLOROUS ACID 0.033 %
SOLUTION, IRRIGATION IRRIGATION ONCE
OUTPATIENT
Start: 2024-10-31 | End: 2024-10-31

## 2024-10-31 RX ORDER — TRIAMCINOLONE ACETONIDE 1 MG/G
OINTMENT TOPICAL ONCE
OUTPATIENT
Start: 2024-10-31 | End: 2024-10-31

## 2024-10-31 RX ORDER — NEOMYCIN/BACITRACIN/POLYMYXINB 3.5-400-5K
OINTMENT (GRAM) TOPICAL ONCE
OUTPATIENT
Start: 2024-10-31 | End: 2024-10-31

## 2024-10-31 RX ORDER — BACITRACIN ZINC AND POLYMYXIN B SULFATE 500; 1000 [USP'U]/G; [USP'U]/G
OINTMENT TOPICAL ONCE
OUTPATIENT
Start: 2024-10-31 | End: 2024-10-31

## 2024-10-31 RX ORDER — LIDOCAINE HYDROCHLORIDE 40 MG/ML
SOLUTION TOPICAL ONCE
OUTPATIENT
Start: 2024-10-31 | End: 2024-10-31

## 2024-10-31 RX ORDER — SILVER SULFADIAZINE 10 MG/G
CREAM TOPICAL ONCE
OUTPATIENT
Start: 2024-10-31 | End: 2024-10-31

## 2024-10-31 RX ORDER — BETAMETHASONE DIPROPIONATE 0.5 MG/G
CREAM TOPICAL ONCE
OUTPATIENT
Start: 2024-10-31 | End: 2024-10-31

## 2024-10-31 RX ORDER — LIDOCAINE 50 MG/G
OINTMENT TOPICAL ONCE
OUTPATIENT
Start: 2024-10-31 | End: 2024-10-31

## 2024-10-31 RX ORDER — LIDOCAINE HYDROCHLORIDE 20 MG/ML
JELLY TOPICAL ONCE
OUTPATIENT
Start: 2024-10-31 | End: 2024-10-31

## 2024-10-31 RX ORDER — LIDOCAINE HYDROCHLORIDE 40 MG/ML
SOLUTION TOPICAL ONCE
Status: COMPLETED | OUTPATIENT
Start: 2024-10-31 | End: 2024-10-31

## 2024-10-31 RX ORDER — GINSENG 100 MG
CAPSULE ORAL ONCE
OUTPATIENT
Start: 2024-10-31 | End: 2024-10-31

## 2024-10-31 RX ADMIN — LIDOCAINE HYDROCHLORIDE: 40 SOLUTION TOPICAL at 08:38

## 2024-10-31 ASSESSMENT — PAIN DESCRIPTION - DESCRIPTORS: DESCRIPTORS: SORE

## 2024-10-31 ASSESSMENT — PAIN DESCRIPTION - ORIENTATION: ORIENTATION: RIGHT

## 2024-10-31 ASSESSMENT — PAIN DESCRIPTION - LOCATION: LOCATION: BUTTOCKS

## 2024-10-31 ASSESSMENT — PAIN SCALES - GENERAL: PAINLEVEL_OUTOF10: 4

## 2024-10-31 NOTE — PROGRESS NOTES
Wound Healing Center Followup Visit Note    Referring Physician : Niall Oconnor MD  Velma Mazariegos  MEDICAL RECORD NUMBER:  [id number]  AGE: 58 y.o.   GENDER: female  : 1965  EPISODE DATE:  10/31/2024    Subjective:     Chief Complaint   Patient presents with    Wound Check     R buttocks      HISTORY of PRESENT ILLNESS HPI   Velma Mazariegos is a 58 y.o. female who presents today in regards to follow up evaluation and treatment of wound/ulcer.  That patient's past medical, family and social hx were reviewed and changes were made if present.    History of Wound Context: The patient has had a wound of right buttock which was first noted approximately 24.  This has been treated IV antibiotic and drain. On their initial visit to the wound healing center, 10/24/2024 ,  the patient has noted that the wound has been improving.  The patient has not had similar previous wounds in the past.      Patient is diabetic without insulin therapy (Jardiance)  Patient denies history of heart attack, stroke, blood clots or anticoagulation therapy     Pt is not on abx at time of initial visit.    10/24/24  Patient presents to wound care for right buttock ulcer   Purulent drainage present possible tunneling from 12 o'clock position   Periwound erythema and generalized skin irritation present   Patient complains of pain at proximal area above wound   Wound debrided   Culture obtained   Iodoform packing and dry dressing         10/31/2024  -depth measures are misleading, there is not a true 0.4 improvement in depth, there was friable tissue at the base easily disturbed with currette and this wound sounds 1.2cm beyond last week.  While the surface area is down, the undermining from 0900 to 1300 is 0.5cm to 1cm.  -the patient has trouble packing the wound given the location... since this is worsening and she has some standard of care since August / 2024 without resolution of the wound, it would be

## 2024-11-04 NOTE — DISCHARGE INSTRUCTIONS
Visit Discharge/Physician Orders     Discharge condition: Stable     Assessment of pain at discharge: minimal     Anesthetic used: 4% lidocaine topically      Discharge to: Home     Left via:Private automobile     Accompanied by: self      ECF/HHA:  [facility] for supplies     Dressing Orders: Buttock right  wound - cleanse with normal saline and then apply elliott dressing. Leave in place 1 week.  Pack with acticote      Antibiotics sent to pharmacy, please  and take as directed.      No soaking or tub bathing, use water proof bandage in shower.  Off load area with pillows      Treatment Orders: FOLLOW NUTRITIOUS DIET. CHOOSE FOODS HIGH IN PROTEIN -CHICKEN- FISH-AND EGGS,  CHOOSE FOODS HIGH IN VITAMIN C.   MULTIVITAMIN DAILY.          [facility] followup visit ________1 week Dr. Steve_____________________  (Please note your next appointment above and if you are unable to keep, kindly give a 24 hour notice. Thank you.)     Physician signature:__________________________        If you experience any of the following, please call the Wound Care Center during business hours:     * Increase in Pain  * Temperature over 101  * Increase in drainage from your wound  * Drainage with a foul odor  * Bleeding  * Increase in swelling  * Need for compression bandage changes due to slippage, breakthrough drainage.     If you need medical attention outside of the business hours of the Wound Care Centers please contact your PCP or go to the nearest emergency room.

## 2024-11-07 ENCOUNTER — HOSPITAL ENCOUNTER (OUTPATIENT)
Dept: WOUND CARE | Age: 59
Discharge: HOME | End: 2024-11-07
Attending: SURGERY
Payer: COMMERCIAL

## 2024-11-07 VITALS
DIASTOLIC BLOOD PRESSURE: 72 MMHG | SYSTOLIC BLOOD PRESSURE: 128 MMHG | TEMPERATURE: 97.6 F | HEART RATE: 73 BPM | RESPIRATION RATE: 18 BRPM

## 2024-11-07 DIAGNOSIS — L89.313 DECUBITUS ULCER OF RIGHT BUTTOCK, STAGE 3 (HCC): Primary | ICD-10-CM

## 2024-11-07 PROCEDURE — 97607 NEG PRS WND THR NDME<=50SQCM: CPT

## 2024-11-07 PROCEDURE — 11042 DBRDMT SUBQ TIS 1ST 20SQCM/<: CPT

## 2024-11-07 RX ORDER — LIDOCAINE 50 MG/G
OINTMENT TOPICAL ONCE
OUTPATIENT
Start: 2024-11-07 | End: 2024-11-07

## 2024-11-07 RX ORDER — SODIUM CHLOR/HYPOCHLOROUS ACID 0.033 %
SOLUTION, IRRIGATION IRRIGATION ONCE
OUTPATIENT
Start: 2024-11-07 | End: 2024-11-07

## 2024-11-07 RX ORDER — SILVER SULFADIAZINE 10 MG/G
CREAM TOPICAL ONCE
OUTPATIENT
Start: 2024-11-07 | End: 2024-11-07

## 2024-11-07 RX ORDER — MUPIROCIN 20 MG/G
OINTMENT TOPICAL ONCE
OUTPATIENT
Start: 2024-11-07 | End: 2024-11-07

## 2024-11-07 RX ORDER — LIDOCAINE HYDROCHLORIDE 20 MG/ML
JELLY TOPICAL ONCE
OUTPATIENT
Start: 2024-11-07 | End: 2024-11-07

## 2024-11-07 RX ORDER — LIDOCAINE HYDROCHLORIDE 40 MG/ML
SOLUTION TOPICAL ONCE
OUTPATIENT
Start: 2024-11-07 | End: 2024-11-07

## 2024-11-07 RX ORDER — NEOMYCIN/BACITRACIN/POLYMYXINB 3.5-400-5K
OINTMENT (GRAM) TOPICAL ONCE
OUTPATIENT
Start: 2024-11-07 | End: 2024-11-07

## 2024-11-07 RX ORDER — GINSENG 100 MG
CAPSULE ORAL ONCE
OUTPATIENT
Start: 2024-11-07 | End: 2024-11-07

## 2024-11-07 RX ORDER — BACITRACIN ZINC AND POLYMYXIN B SULFATE 500; 1000 [USP'U]/G; [USP'U]/G
OINTMENT TOPICAL ONCE
OUTPATIENT
Start: 2024-11-07 | End: 2024-11-07

## 2024-11-07 RX ORDER — LIDOCAINE 40 MG/G
CREAM TOPICAL ONCE
OUTPATIENT
Start: 2024-11-07 | End: 2024-11-07

## 2024-11-07 RX ORDER — GENTAMICIN SULFATE 1 MG/G
OINTMENT TOPICAL ONCE
OUTPATIENT
Start: 2024-11-07 | End: 2024-11-07

## 2024-11-07 RX ORDER — BETAMETHASONE DIPROPIONATE 0.5 MG/G
CREAM TOPICAL ONCE
OUTPATIENT
Start: 2024-11-07 | End: 2024-11-07

## 2024-11-07 RX ORDER — LIDOCAINE HYDROCHLORIDE 40 MG/ML
SOLUTION TOPICAL ONCE
Status: COMPLETED | OUTPATIENT
Start: 2024-11-07 | End: 2024-11-07

## 2024-11-07 RX ORDER — CLOBETASOL PROPIONATE 0.5 MG/G
OINTMENT TOPICAL ONCE
OUTPATIENT
Start: 2024-11-07 | End: 2024-11-07

## 2024-11-07 RX ORDER — TRIAMCINOLONE ACETONIDE 1 MG/G
OINTMENT TOPICAL ONCE
OUTPATIENT
Start: 2024-11-07 | End: 2024-11-07

## 2024-11-07 RX ADMIN — LIDOCAINE HYDROCHLORIDE: 40 SOLUTION TOPICAL at 08:25

## 2024-11-07 ASSESSMENT — PAIN - FUNCTIONAL ASSESSMENT: PAIN_FUNCTIONAL_ASSESSMENT: PREVENTS OR INTERFERES SOME ACTIVE ACTIVITIES AND ADLS

## 2024-11-07 ASSESSMENT — PAIN DESCRIPTION - ORIENTATION: ORIENTATION: RIGHT

## 2024-11-07 ASSESSMENT — PAIN DESCRIPTION - LOCATION: LOCATION: BUTTOCKS

## 2024-11-07 ASSESSMENT — PAIN DESCRIPTION - DESCRIPTORS: DESCRIPTORS: DISCOMFORT

## 2024-11-07 ASSESSMENT — PAIN DESCRIPTION - ONSET: ONSET: ON-GOING

## 2024-11-07 ASSESSMENT — PAIN DESCRIPTION - FREQUENCY: FREQUENCY: INTERMITTENT

## 2024-11-07 ASSESSMENT — PAIN DESCRIPTION - PAIN TYPE: TYPE: CHRONIC PAIN

## 2024-11-07 ASSESSMENT — PAIN SCALES - GENERAL: PAINLEVEL_OUTOF10: 7

## 2024-11-07 NOTE — PROGRESS NOTES
Wound Healing Center Followup Visit Note    Referring Physician : Niall Oconnor MD  Velma Mazariegos  MEDICAL RECORD NUMBER:  [id number]  AGE: 58 y.o.   GENDER: female  : 1965  EPISODE DATE:  2024    Subjective:     Chief Complaint   Patient presents with    Wound Check      HISTORY of PRESENT ILLNESS HPI   Velma Mazariegos is a 58 y.o. female who presents today in regards to follow up evaluation and treatment of wound/ulcer.  That patient's past medical, family and social hx were reviewed and changes were made if present.    History of Wound Context:  The patient has had a wound of right buttock which was first noted approximately 24.  This has been treated IV antibiotic and drain. On their initial visit to the wound healing center, 10/24/2024 ,  the patient has noted that the wound has been improving.  The patient has not had similar previous wounds in the past.      Patient is diabetic without insulin therapy (Jardiance)  Patient denies history of heart attack, stroke, blood clots or anticoagulation therapy     Pt is not on abx at time of initial visit.    10/24/24  Patient presents to wound care for right buttock ulcer   Purulent drainage present possible tunneling from 12 o'clock position   Periwound erythema and generalized skin irritation present   Patient complains of pain at proximal area above wound   Wound debrided   Culture obtained   Iodoform packing and dry dressing         10/31/2024  -depth measures are misleading, there is not a true 0.4 improvement in depth, there was friable tissue at the base easily disturbed with currette and this wound sounds 1.2cm beyond last week.  While the surface area is down, the undermining from 0900 to 1300 is 0.5cm to 1cm.  -the patient has trouble packing the wound given the location... since this is worsening and she has some standard of care since August / 2024 without resolution of the wound, it would be reasonable to use

## 2024-11-09 ENCOUNTER — HOSPITAL ENCOUNTER (OUTPATIENT)
Age: 59
Discharge: HOME | End: 2024-11-09
Payer: COMMERCIAL

## 2024-11-09 LAB
ALT SERPL-CCNC: 20 U/L (ref 0–32)
BASOPHILS # BLD: 0.01 K/UL (ref 0–0.2)
BASOPHILS NFR BLD: 0 % (ref 0–2)
EOSINOPHIL # BLD: 0.04 K/UL (ref 0.05–0.5)
EOSINOPHILS RELATIVE PERCENT: 1 % (ref 0–6)
ERYTHROCYTE [DISTWIDTH] IN BLOOD BY AUTOMATED COUNT: 16.6 % (ref 11.5–15)
GGT SERPL-CCNC: 29 U/L (ref 6–42)
HCT VFR BLD AUTO: 42.4 % (ref 34–48)
HGB BLD-MCNC: 13.5 G/DL (ref 11.5–15.5)
IMM GRANULOCYTES # BLD AUTO: 0.08 K/UL (ref 0–0.58)
IMM GRANULOCYTES NFR BLD: 1 % (ref 0–5)
LYMPHOCYTES NFR BLD: 0.94 K/UL (ref 1.5–4)
LYMPHOCYTES RELATIVE PERCENT: 11 % (ref 20–42)
MCH RBC QN AUTO: 28 PG (ref 26–35)
MCHC RBC AUTO-ENTMCNC: 31.8 G/DL (ref 32–34.5)
MCV RBC AUTO: 88 FL (ref 80–99.9)
MONOCYTES NFR BLD: 0.38 K/UL (ref 0.1–0.95)
MONOCYTES NFR BLD: 5 % (ref 2–12)
NEUTROPHILS NFR BLD: 82 % (ref 43–80)
NEUTS SEG NFR BLD: 6.78 K/UL (ref 1.8–7.3)
PLATELET # BLD AUTO: 260 K/UL (ref 130–450)
PMV BLD AUTO: 8.9 FL (ref 7–12)
RBC # BLD AUTO: 4.82 M/UL (ref 3.5–5.5)
WBC OTHER # BLD: 8.2 K/UL (ref 4.5–11.5)

## 2024-11-09 PROCEDURE — 84460 ALANINE AMINO (ALT) (SGPT): CPT

## 2024-11-09 PROCEDURE — 82977 ASSAY OF GGT: CPT

## 2024-11-09 PROCEDURE — 85025 COMPLETE CBC W/AUTO DIFF WBC: CPT

## 2024-11-09 PROCEDURE — 36415 COLL VENOUS BLD VENIPUNCTURE: CPT

## 2024-11-11 NOTE — DISCHARGE INSTRUCTIONS
Visit Discharge/Physician Orders     Discharge condition: Stable     Assessment of pain at discharge: minimal     Anesthetic used: 4% lidocaine topically      Discharge to: Home     Left via:Private automobile     Accompanied by: self      ECF/HHA:  [facility] for supplies     Dressing Orders: Buttock right  wound - cleanse with normal saline and then apply elliott dressing. Leave in place 1 week.  Pack with acticote                               If dressing fails - cleanse with normal saline and apply alginate and dry dressing, change daily.      Antibiotics sent to pharmacy, please  and take as directed.      No soaking or tub bathing, use water proof bandage in shower.  Off load area with pillows      Treatment Orders: FOLLOW NUTRITIOUS DIET. CHOOSE FOODS HIGH IN PROTEIN -CHICKEN- FISH-AND EGGS,  CHOOSE FOODS HIGH IN VITAMIN C.   MULTIVITAMIN DAILY.          [facility] followup visit ________1 week Dr. Steve_____________________  (Please note your next appointment above and if you are unable to keep, kindly give a 24 hour notice. Thank you.)     Physician signature:__________________________        If you experience any of the following, please call the Wound Care Center during business hours:     * Increase in Pain  * Temperature over 101  * Increase in drainage from your wound  * Drainage with a foul odor  * Bleeding  * Increase in swelling  * Need for compression bandage changes due to slippage, breakthrough drainage.     If you need medical attention outside of the business hours of the Wound Care Centers please contact your PCP or go to the nearest emergency room.

## 2024-11-14 ENCOUNTER — HOSPITAL ENCOUNTER (OUTPATIENT)
Dept: WOUND CARE | Age: 59
Discharge: HOME | End: 2024-11-14
Attending: SURGERY
Payer: COMMERCIAL

## 2024-11-14 VITALS
TEMPERATURE: 97.5 F | SYSTOLIC BLOOD PRESSURE: 152 MMHG | HEIGHT: 63 IN | RESPIRATION RATE: 18 BRPM | BODY MASS INDEX: 35.79 KG/M2 | WEIGHT: 202 LBS | HEART RATE: 81 BPM | DIASTOLIC BLOOD PRESSURE: 84 MMHG

## 2024-11-14 DIAGNOSIS — L89.313 DECUBITUS ULCER OF RIGHT BUTTOCK, STAGE 3 (HCC): Primary | ICD-10-CM

## 2024-11-14 PROCEDURE — 11042 DBRDMT SUBQ TIS 1ST 20SQCM/<: CPT | Performed by: SURGERY

## 2024-11-14 PROCEDURE — 97607 NEG PRS WND THR NDME<=50SQCM: CPT

## 2024-11-14 PROCEDURE — 11042 DBRDMT SUBQ TIS 1ST 20SQCM/<: CPT

## 2024-11-14 RX ORDER — LIDOCAINE HYDROCHLORIDE 40 MG/ML
SOLUTION TOPICAL ONCE
Status: COMPLETED | OUTPATIENT
Start: 2024-11-14 | End: 2024-11-14

## 2024-11-14 RX ORDER — SODIUM CHLOR/HYPOCHLOROUS ACID 0.033 %
SOLUTION, IRRIGATION IRRIGATION ONCE
OUTPATIENT
Start: 2024-11-14 | End: 2024-11-14

## 2024-11-14 RX ORDER — LIDOCAINE 50 MG/G
OINTMENT TOPICAL ONCE
OUTPATIENT
Start: 2024-11-14 | End: 2024-11-14

## 2024-11-14 RX ORDER — CLOBETASOL PROPIONATE 0.5 MG/G
OINTMENT TOPICAL ONCE
OUTPATIENT
Start: 2024-11-14 | End: 2024-11-14

## 2024-11-14 RX ORDER — GINSENG 100 MG
CAPSULE ORAL ONCE
OUTPATIENT
Start: 2024-11-14 | End: 2024-11-14

## 2024-11-14 RX ORDER — LIDOCAINE 40 MG/G
CREAM TOPICAL ONCE
OUTPATIENT
Start: 2024-11-14 | End: 2024-11-14

## 2024-11-14 RX ORDER — NEOMYCIN/BACITRACIN/POLYMYXINB 3.5-400-5K
OINTMENT (GRAM) TOPICAL ONCE
OUTPATIENT
Start: 2024-11-14 | End: 2024-11-14

## 2024-11-14 RX ORDER — LIDOCAINE HYDROCHLORIDE 40 MG/ML
SOLUTION TOPICAL ONCE
OUTPATIENT
Start: 2024-11-14 | End: 2024-11-14

## 2024-11-14 RX ORDER — GENTAMICIN SULFATE 1 MG/G
OINTMENT TOPICAL ONCE
OUTPATIENT
Start: 2024-11-14 | End: 2024-11-14

## 2024-11-14 RX ORDER — SILVER SULFADIAZINE 10 MG/G
CREAM TOPICAL ONCE
OUTPATIENT
Start: 2024-11-14 | End: 2024-11-14

## 2024-11-14 RX ORDER — MUPIROCIN 20 MG/G
OINTMENT TOPICAL ONCE
OUTPATIENT
Start: 2024-11-14 | End: 2024-11-14

## 2024-11-14 RX ORDER — LIDOCAINE HYDROCHLORIDE 20 MG/ML
JELLY TOPICAL ONCE
OUTPATIENT
Start: 2024-11-14 | End: 2024-11-14

## 2024-11-14 RX ORDER — TRIAMCINOLONE ACETONIDE 1 MG/G
OINTMENT TOPICAL ONCE
OUTPATIENT
Start: 2024-11-14 | End: 2024-11-14

## 2024-11-14 RX ORDER — BACITRACIN ZINC AND POLYMYXIN B SULFATE 500; 1000 [USP'U]/G; [USP'U]/G
OINTMENT TOPICAL ONCE
OUTPATIENT
Start: 2024-11-14 | End: 2024-11-14

## 2024-11-14 RX ORDER — BETAMETHASONE DIPROPIONATE 0.5 MG/G
CREAM TOPICAL ONCE
OUTPATIENT
Start: 2024-11-14 | End: 2024-11-14

## 2024-11-14 RX ADMIN — LIDOCAINE HYDROCHLORIDE 10 ML: 40 SOLUTION TOPICAL at 08:14

## 2024-11-14 NOTE — PROGRESS NOTES
Wound Healing Center Followup Visit Note    Referring Physician : Niall Oconnor MD  Velma Mazariegos  MEDICAL RECORD NUMBER:  [id number]  AGE: 58 y.o.   GENDER: female  : 1965  EPISODE DATE:  2024    Subjective:     Chief Complaint   Patient presents with    Wound Check     Right buttock      HISTORY of PRESENT ILLNESS HPI   Velma Mazariegos is a 58 y.o. female who presents today in regards to follow up evaluation and treatment of wound/ulcer.  That patient's past medical, family and social hx were reviewed and changes were made if present.    History of Wound Context:  The patient has had a wound of right buttock which was first noted approximately 24.  This has been treated IV antibiotic and drain. On their initial visit to the wound healing center, 10/24/2024 ,  the patient has noted that the wound has been improving.  The patient has not had similar previous wounds in the past.      Patient is diabetic without insulin therapy (Jardiance)  Patient denies history of heart attack, stroke, blood clots or anticoagulation therapy     Pt is not on abx at time of initial visit.    10/24/24  Patient presents to wound care for right buttock ulcer   Purulent drainage present possible tunneling from 12 o'clock position   Periwound erythema and generalized skin irritation present   Patient complains of pain at proximal area above wound   Wound debrided   Culture obtained   Iodoform packing and dry dressing         10/31/2024  -depth measures are misleading, there is not a true 0.4 improvement in depth, there was friable tissue at the base easily disturbed with currette and this wound sounds 1.2cm beyond last week.  While the surface area is down, the undermining from 0900 to 1300 is 0.5cm to 1cm.  -the patient has trouble packing the wound given the location... since this is worsening and she has some standard of care since August / 2024 without resolution of the wound, it would

## 2024-11-18 NOTE — DISCHARGE INSTRUCTIONS
Visit Discharge/Physician Orders     Discharge condition: Stable     Assessment of pain at discharge: minimal     Anesthetic used: 4% lidocaine topically      Discharge to: Home     Left via:Private automobile     Accompanied by: self      ECF/HHA:  [facility] for supplies     Dressing Orders: Buttock right  wound - cleanse with normal saline and then apply elliott dressing. Leave in place 1 week.  Pack with acticote                               If dressing fails - cleanse with normal saline and apply alginate and dry dressing, change daily.      Antibiotics sent to pharmacy, please  and take as directed.      No soaking or tub bathing, use water proof bandage in shower.  Off load area with pillows      Treatment Orders: FOLLOW NUTRITIOUS DIET. CHOOSE FOODS HIGH IN PROTEIN -CHICKEN- FISH-AND EGGS,  CHOOSE FOODS HIGH IN VITAMIN C.   MULTIVITAMIN DAILY.          [facility] followup visit ________1 week Dr. Steve_____________________  (Please note your next appointment above and if you are unable to keep, kindly give a 24 hour notice. Thank you.)     Physician signature:__________________________        If you experience any of the following, please call the Wound Care Center during business hours:     * Increase in Pain  * Temperature over 101  * Increase in drainage from your wound  * Drainage with a foul odor  * Bleeding  * Increase in swelling  * Need for compression bandage changes due to slippage, breakthrough drainage.     If you need medical attention outside of the business hours of the Wound Care Centers please contact your PCP or go to the nearest emergency room.

## 2024-11-19 ENCOUNTER — HOSPITAL ENCOUNTER (OUTPATIENT)
Dept: WOUND CARE | Age: 59
Discharge: HOME | End: 2024-11-19
Attending: SURGERY
Payer: COMMERCIAL

## 2024-11-19 VITALS
WEIGHT: 202 LBS | HEART RATE: 97 BPM | BODY MASS INDEX: 35.79 KG/M2 | TEMPERATURE: 97.3 F | RESPIRATION RATE: 20 BRPM | HEIGHT: 63 IN | DIASTOLIC BLOOD PRESSURE: 96 MMHG | SYSTOLIC BLOOD PRESSURE: 153 MMHG

## 2024-11-19 DIAGNOSIS — L89.313 DECUBITUS ULCER OF RIGHT BUTTOCK, STAGE 3 (HCC): Primary | ICD-10-CM

## 2024-11-19 PROCEDURE — 97607 NEG PRS WND THR NDME<=50SQCM: CPT

## 2024-11-19 ASSESSMENT — PAIN SCALES - GENERAL: PAINLEVEL_OUTOF10: 6

## 2024-11-19 ASSESSMENT — PAIN DESCRIPTION - DESCRIPTORS: DESCRIPTORS: BURNING;PRESSURE

## 2024-11-19 NOTE — PROGRESS NOTES
Wound Care Nurse Visit Note        Velma Mazariegos  Age: 58 y.o.  YOB: 1965    Today's Date:  11/19/2024    Patient presents today per physician order for a scheduled nurse visit.    Orders to follow same dressings as previous visit and to report any changes in conditions if noted.    BP (!) 153/96   Pulse 97   Temp 97.3 °F (36.3 °C) (Temporal)   Resp 20   Ht 1.6 m (5' 3\")   Wt 91.6 kg (202 lb)   BMI 35.78 kg/m²       Wound Assessment:      Wound 10/24/24 Buttocks Right #1  right buttock (Active)   Wound Image   10/24/24 1325   Wound Etiology Other 10/24/24 1325   Dressing Status New dressing applied 11/14/24 0856   Wound Cleansed Cleansed with saline 11/14/24 0856   Dressing/Treatment Negative pressure wound therapy;Other (comment) 11/14/24 0856   Wound Length (cm) 0.4 cm 11/14/24 0808   Wound Width (cm) 0.5 cm 11/14/24 0808   Wound Depth (cm) 0.5 cm 11/14/24 0808   Wound Surface Area (cm^2) 0.2 cm^2 11/14/24 0808   Change in Wound Size % (l*w) 60 11/14/24 0808   Wound Volume (cm^3) 0.1 cm^3 11/14/24 0808   Wound Healing % 0 11/14/24 0808   Post-Procedure Length (cm) 0.5 cm 11/14/24 0836   Post-Procedure Width (cm) 0.6 cm 11/14/24 0836   Post-Procedure Depth (cm) 0.6 cm 11/14/24 0836   Post-Procedure Surface Area (cm^2) 0.3 cm^2 11/14/24 0836   Post-Procedure Volume (cm^3) 0.18 cm^3 11/14/24 0836   Distance Tunneling (cm) 0.7 cm 10/24/24 1345   Tunneling Position ___ O'Clock 12 10/24/24 1345   Undermining Starts ___ O'Clock 9 11/07/24 0821   Undermining Ends___ O'Clock 12 11/07/24 0821   Undermining Maxium Distance (cm) 0.4 @ 12 11/07/24 0821   Wound Assessment Pale granulation tissue 11/14/24 0808   Drainage Amount Moderate (25-50%) 11/14/24 0808   Drainage Description Thick;Yellow;Brown 11/14/24 0808   Odor None 11/14/24 0808   Laxmi-wound Assessment Blanchable erythema 11/14/24 0808   Number of days: 25        Wound was redressed per order

## 2024-11-19 NOTE — DISCHARGE INSTRUCTIONS
Visit Discharge/Physician Orders     Discharge condition: Stable     Assessment of pain at discharge: minimal     Anesthetic used: 4% lidocaine topically      Discharge to: Home     Left via:Private automobile     Accompanied by: self      ECF/HHA:  [facility] for supplies     Dressing Orders: Buttock right  wound - cleanse with normal saline and then apply elliott dressing. Leave in place 1 week.  Pack with acticote                               If dressing fails - cleanse with normal saline and apply alginate and dry dressing, change daily.      Antibiotics sent to pharmacy, please  and take as directed.      No soaking or tub bathing, use water proof bandage in shower.  Off load area with pillows      Treatment Orders: FOLLOW NUTRITIOUS DIET. CHOOSE FOODS HIGH IN PROTEIN -CHICKEN- FISH-AND EGGS,  CHOOSE FOODS HIGH IN VITAMIN C.   MULTIVITAMIN DAILY.          [facility] followup visit ________1 week Dr. Steve_____________________  (Please note your next appointment above and if you are unable to keep, kindly give a 24 hour notice. Thank you.)     Physician signature:__________________________        If you experience any of the following, please call the Wound Care Center during business hours:     * Increase in Pain  * Temperature over 101  * Increase in drainage from your wound  * Drainage with a foul odor  * Bleeding  * Increase in swelling  * Need for compression bandage changes due to slippage, breakthrough drainage.     If you need medical attention outside of the business hours of the Wound Care Centers please contact your PCP or go to the nearest emergency room.

## 2024-11-22 ENCOUNTER — HOSPITAL ENCOUNTER (OUTPATIENT)
Dept: WOUND CARE | Age: 59
Discharge: HOME | End: 2024-11-22
Attending: SURGERY

## 2024-11-26 ENCOUNTER — HOSPITAL ENCOUNTER (OUTPATIENT)
Dept: WOUND CARE | Age: 59
Discharge: HOME | End: 2024-11-26
Attending: SURGERY
Payer: COMMERCIAL

## 2024-11-26 VITALS
DIASTOLIC BLOOD PRESSURE: 73 MMHG | TEMPERATURE: 97.6 F | RESPIRATION RATE: 18 BRPM | SYSTOLIC BLOOD PRESSURE: 126 MMHG | HEART RATE: 89 BPM

## 2024-11-26 DIAGNOSIS — L89.313 DECUBITUS ULCER OF RIGHT BUTTOCK, STAGE 3 (HCC): Primary | ICD-10-CM

## 2024-11-26 PROCEDURE — 11042 DBRDMT SUBQ TIS 1ST 20SQCM/<: CPT

## 2024-11-26 PROCEDURE — 11042 DBRDMT SUBQ TIS 1ST 20SQCM/<: CPT | Performed by: SURGERY

## 2024-11-26 RX ORDER — BACITRACIN ZINC AND POLYMYXIN B SULFATE 500; 1000 [USP'U]/G; [USP'U]/G
OINTMENT TOPICAL ONCE
OUTPATIENT
Start: 2024-11-26 | End: 2024-11-26

## 2024-11-26 RX ORDER — SODIUM CHLOR/HYPOCHLOROUS ACID 0.033 %
SOLUTION, IRRIGATION IRRIGATION ONCE
OUTPATIENT
Start: 2024-11-26 | End: 2024-11-26

## 2024-11-26 RX ORDER — GENTAMICIN SULFATE 1 MG/G
OINTMENT TOPICAL ONCE
OUTPATIENT
Start: 2024-11-26 | End: 2024-11-26

## 2024-11-26 RX ORDER — MUPIROCIN 20 MG/G
OINTMENT TOPICAL ONCE
OUTPATIENT
Start: 2024-11-26 | End: 2024-11-26

## 2024-11-26 RX ORDER — LIDOCAINE HYDROCHLORIDE 20 MG/ML
JELLY TOPICAL ONCE
OUTPATIENT
Start: 2024-11-26 | End: 2024-11-26

## 2024-11-26 RX ORDER — SILVER SULFADIAZINE 10 MG/G
CREAM TOPICAL ONCE
OUTPATIENT
Start: 2024-11-26 | End: 2024-11-26

## 2024-11-26 RX ORDER — LIDOCAINE HYDROCHLORIDE 40 MG/ML
SOLUTION TOPICAL ONCE
OUTPATIENT
Start: 2024-11-26 | End: 2024-11-26

## 2024-11-26 RX ORDER — LIDOCAINE HYDROCHLORIDE 40 MG/ML
SOLUTION TOPICAL ONCE
Status: COMPLETED | OUTPATIENT
Start: 2024-11-26 | End: 2024-11-26

## 2024-11-26 RX ORDER — LIDOCAINE 40 MG/G
CREAM TOPICAL ONCE
OUTPATIENT
Start: 2024-11-26 | End: 2024-11-26

## 2024-11-26 RX ORDER — NEOMYCIN/BACITRACIN/POLYMYXINB 3.5-400-5K
OINTMENT (GRAM) TOPICAL ONCE
OUTPATIENT
Start: 2024-11-26 | End: 2024-11-26

## 2024-11-26 RX ORDER — LIDOCAINE 50 MG/G
OINTMENT TOPICAL ONCE
OUTPATIENT
Start: 2024-11-26 | End: 2024-11-26

## 2024-11-26 RX ORDER — CLOBETASOL PROPIONATE 0.5 MG/G
OINTMENT TOPICAL ONCE
OUTPATIENT
Start: 2024-11-26 | End: 2024-11-26

## 2024-11-26 RX ORDER — BETAMETHASONE DIPROPIONATE 0.5 MG/G
CREAM TOPICAL ONCE
OUTPATIENT
Start: 2024-11-26 | End: 2024-11-26

## 2024-11-26 RX ORDER — GINSENG 100 MG
CAPSULE ORAL ONCE
OUTPATIENT
Start: 2024-11-26 | End: 2024-11-26

## 2024-11-26 RX ORDER — TRIAMCINOLONE ACETONIDE 1 MG/G
OINTMENT TOPICAL ONCE
OUTPATIENT
Start: 2024-11-26 | End: 2024-11-26

## 2024-11-26 RX ADMIN — LIDOCAINE HYDROCHLORIDE: 40 SOLUTION TOPICAL at 13:15

## 2024-11-26 NOTE — PROGRESS NOTES
Wound Healing Center Followup Visit Note    Referring Physician : Niall Oconnor MD  Velma Mazariegos  MEDICAL RECORD NUMBER:  [id number]  AGE: 58 y.o.   GENDER: female  : 1965  EPISODE DATE:  2024    Subjective:     Chief Complaint   Patient presents with    Wound Check     Buttock right      HISTORY of PRESENT ILLNESS HPI   Velma Mazariegos is a 58 y.o. female who presents today in regards to follow up evaluation and treatment of wound/ulcer.  That patient's past medical, family and social hx were reviewed and changes were made if present.    History of Wound Context:  The patient has had a wound of right buttock which was first noted approximately 24.  This has been treated IV antibiotic and drain. On their initial visit to the wound healing center, 10/24/2024 ,  the patient has noted that the wound has been improving.  The patient has not had similar previous wounds in the past.      Patient is diabetic without insulin therapy (Jardiance)  Patient denies history of heart attack, stroke, blood clots or anticoagulation therapy     Pt is not on abx at time of initial visit.    10/24/24  Patient presents to wound care for right buttock ulcer   Purulent drainage present possible tunneling from 12 o'clock position   Periwound erythema and generalized skin irritation present   Patient complains of pain at proximal area above wound   Wound debrided   Culture obtained   Iodoform packing and dry dressing         10/31/2024  -depth measures are misleading, there is not a true 0.4 improvement in depth, there was friable tissue at the base easily disturbed with currette and this wound sounds 1.2cm beyond last week.  While the surface area is down, the undermining from 0900 to 1300 is 0.5cm to 1cm.  -the patient has trouble packing the wound given the location... since this is worsening and she has some standard of care since August / 2024 without resolution of the wound, it would  WDL

## 2024-11-26 NOTE — DISCHARGE INSTRUCTIONS
Visit Discharge/Physician Orders     Discharge condition: Stable     Assessment of pain at discharge: minimal     Anesthetic used: 4% lidocaine topically      Discharge to: Home     Left via:Private automobile     Accompanied by: self      ECF/HHA:  [facility] for supplies     Dressing Orders: Buttock right  wound - cleanse with normal saline and then apply elliott dressing. Leave in place 1 week.  Pack with acticote. Apply zinc to reddened areas of skin.                              If dressing fails - cleanse with normal saline and apply alginate and dry dressing, change daily.            No soaking or tub bathing, use water proof bandage in shower.  Off load area with pillows      Treatment Orders: FOLLOW NUTRITIOUS DIET. CHOOSE FOODS HIGH IN PROTEIN -CHICKEN- FISH-AND EGGS,  CHOOSE FOODS HIGH IN VITAMIN C.   MULTIVITAMIN DAILY.          [facility] followup visit ________1 week Dr. Steve_____________________  (Please note your next appointment above and if you are unable to keep, kindly give a 24 hour notice. Thank you.)     Physician signature:__________________________        If you experience any of the following, please call the Wound Care Center during business hours:     * Increase in Pain  * Temperature over 101  * Increase in drainage from your wound  * Drainage with a foul odor  * Bleeding  * Increase in swelling  * Need for compression bandage changes due to slippage, breakthrough drainage.     If you need medical attention outside of the business hours of the Wound Care Centers please contact your PCP or go to the nearest emergency room.

## 2024-12-04 ENCOUNTER — INITIAL CONSULT (OUTPATIENT)
Dept: SURGERY | Age: 59
End: 2024-12-04
Payer: COMMERCIAL

## 2024-12-04 ENCOUNTER — HOSPITAL ENCOUNTER (OUTPATIENT)
Age: 59
Discharge: HOME | End: 2024-12-04
Payer: COMMERCIAL

## 2024-12-04 ENCOUNTER — TELEPHONE (OUTPATIENT)
Dept: SURGERY | Age: 59
End: 2024-12-04

## 2024-12-04 VITALS
RESPIRATION RATE: 18 BRPM | HEART RATE: 96 BPM | SYSTOLIC BLOOD PRESSURE: 140 MMHG | DIASTOLIC BLOOD PRESSURE: 72 MMHG | BODY MASS INDEX: 35.44 KG/M2 | TEMPERATURE: 99.7 F | OXYGEN SATURATION: 99 % | HEIGHT: 63 IN | WEIGHT: 200 LBS

## 2024-12-04 DIAGNOSIS — K57.20 DIVERTICULITIS OF LARGE INTESTINE WITH PERFORATION AND ABSCESS WITHOUT BLEEDING: Primary | ICD-10-CM

## 2024-12-04 DIAGNOSIS — K57.20 DIVERTICULITIS OF LARGE INTESTINE WITH PERFORATION AND ABSCESS WITHOUT BLEEDING: ICD-10-CM

## 2024-12-04 DIAGNOSIS — K63.2 COLOCUTANEOUS FISTULA: ICD-10-CM

## 2024-12-04 LAB
ANION GAP SERPL CALCULATED.3IONS-SCNC: 12 MMOL/L (ref 7–16)
BUN SERPL-MCNC: 26 MG/DL (ref 6–23)
CALCIUM SERPL-MCNC: 9.5 MG/DL (ref 8.6–10.2)
CHLORIDE SERPL-SCNC: 102 MMOL/L (ref 98–107)
CO2 SERPL-SCNC: 25 MMOL/L (ref 22–29)
CREAT SERPL-MCNC: 1 MG/DL (ref 0.5–1)
GFR, ESTIMATED: 63 ML/MIN/1.73M2
GLUCOSE SERPL-MCNC: 144 MG/DL (ref 74–99)
POTASSIUM SERPL-SCNC: 4.1 MMOL/L (ref 3.5–5)
SODIUM SERPL-SCNC: 139 MMOL/L (ref 132–146)

## 2024-12-04 PROCEDURE — 80048 BASIC METABOLIC PNL TOTAL CA: CPT

## 2024-12-04 PROCEDURE — 36415 COLL VENOUS BLD VENIPUNCTURE: CPT

## 2024-12-04 PROCEDURE — 99203 OFFICE O/P NEW LOW 30 MIN: CPT | Performed by: SURGERY

## 2024-12-04 PROCEDURE — G8484 FLU IMMUNIZE NO ADMIN: HCPCS | Performed by: SURGERY

## 2024-12-04 PROCEDURE — G8427 DOCREV CUR MEDS BY ELIG CLIN: HCPCS | Performed by: SURGERY

## 2024-12-04 PROCEDURE — 3017F COLORECTAL CA SCREEN DOC REV: CPT | Performed by: SURGERY

## 2024-12-04 PROCEDURE — 4004F PT TOBACCO SCREEN RCVD TLK: CPT | Performed by: SURGERY

## 2024-12-04 PROCEDURE — G8417 CALC BMI ABV UP PARAM F/U: HCPCS | Performed by: SURGERY

## 2024-12-04 RX ORDER — ERYTHROMYCIN 500 MG/1
1000 TABLET, COATED ORAL 3 TIMES DAILY
Qty: 6 TABLET | Refills: 0 | Status: SHIPPED | OUTPATIENT
Start: 2024-12-04 | End: 2024-12-05

## 2024-12-04 RX ORDER — SULFAMETHOXAZOLE AND TRIMETHOPRIM 800; 160 MG/1; MG/1
1 TABLET ORAL 2 TIMES DAILY
Qty: 20 TABLET | Refills: 0 | Status: SHIPPED | OUTPATIENT
Start: 2024-12-04 | End: 2024-12-14

## 2024-12-04 RX ORDER — METRONIDAZOLE 500 MG/1
500 TABLET ORAL 3 TIMES DAILY
Qty: 3 TABLET | Refills: 0 | Status: SHIPPED | OUTPATIENT
Start: 2024-12-04 | End: 2024-12-05

## 2024-12-04 RX ORDER — POLYETHYLENE GLYCOL 3350, SODIUM SULFATE ANHYDROUS, SODIUM BICARBONATE, SODIUM CHLORIDE, POTASSIUM CHLORIDE 236; 22.74; 6.74; 5.86; 2.97 G/4L; G/4L; G/4L; G/4L; G/4L
4 POWDER, FOR SOLUTION ORAL ONCE
Qty: 4000 ML | Refills: 0 | Status: SHIPPED | OUTPATIENT
Start: 2024-12-04 | End: 2024-12-04

## 2024-12-04 NOTE — TELEPHONE ENCOUNTER
Per the order of Dr. Carranza, patient has been scheduled for Laparoscopic robotic sigmoid colectomy on 2025.  Patient provided with procedure information during office visit and scheduled for post op follow up appointment.  Patient instructed to please contact our office with any questions.    Patient scheduled for CT Scan at [facility] to be done prior to surgery.  Patient provided with instructions and order to have labs drawn prior to imaging.    Patient will need medical clearance for surgery.  Request faxed to Dr. Oconnor, patient advised to contact their office to discuss need for medical clearance.    Patient informed that she will need to hold jardiance prior to surgery.    Steen prep and bowel prep instructions provided and reviewed in office.    Obtain final Colonoscopy report and pathology from Aniket Palma prior to surgery.    Procedure scheduled through [facility].  Dr. Carranza to enter orders.          Prior Authorization Form:      DEMOGRAPHICS:                     Patient Name:  Velma Mazariegos  Patient :  1965            Insurance:  Payor: [facility] / Plan: [facility] MEDICAID / Product Type: *No Product type* /   Insurance ID Number:    Payer/Plan Subscr  Sex Relation Sub. Ins. ID Effective Group Num   1. [facility] - * VELMA MAZARIEGOS 1965 Female Self [id number] 9/1/15 [facility] [address]         DIAGNOSIS & PROCEDURE:                       Procedure/Operation: Laparoscopic robotic assisted sigmoid colectomy           CPT Code: 78181    Diagnosis:  Diverticulitis of large intestine with perforation and abscess, Colocutaneous fistula    ICD10 Code: K57.20, K63.2    Location:  [facility]    Surgeon:  Dillon    SCHEDULING INFORMATION:                          Date: 2025    Time: TBD              Anesthesia:  General                                                       Status:  Inpatient        Special Comments:

## 2024-12-04 NOTE — PROGRESS NOTES
General Surgery History and Physical  [facility]    Patient's Name/Date of Birth: Velma Mazariegos / 1965    Date: December 4, 2024     Surgeon: Juan David Carranza M.D.    PCP: Niall Oconnor MD     Chief Complaint: diverticulitis    HPI:   Velma Mazariegos is a 59 y.o. female who presents for evaluation of diverticulitis. Timing is constant, radiation to right buttock, alleviated by none and started months ago and severity is 7/10.  Patient with a complicated history of diverticulitis that started back in August 2024.  She was admitted with pericolonic abscess and had IR drainage performed.  She had a drain placed through her right buttock at that time.  Repeat imaging through October shows resolution of the abscess and she had the drain removed near the end of October.  Since then she has had ongoing problems with the wound at the drain site in the right buttock.  She denies any abdominal pain.  No nausea or vomiting no diarrhea no bloody stools.  She was seen by GI and had a colonoscopy performed which showed some narrowing and stricture in the sigmoid colon but she did have the ability to pass.  No evidence of malignancy was found.  She has been seeing wound care for ongoing treatment of her right buttock wound.  She has been having a wound VAC placed on and off as she states she has significant amount of drainage from it at times foul in odor.  She has not been on any antibiotic treatment.  She does smoke 1 pack/day.    Patient Active Problem List   Diagnosis    Asthma exacerbation    Nicotine addiction    Left ankle sprain    Diverticulitis of colon    Abscess    Right lower quadrant abdominal pain    Colonic diverticular abscess    Decubitus ulcer of right buttock, stage 3 (HCC)    Diabetes mellitus type 2, noninsulin dependent (HCC)       Past Medical History:   Diagnosis Date    Arthritis     Asthma     Diabetes mellitus (HCC)     Thyroid disease        Past Surgical History:

## 2024-12-06 ENCOUNTER — HOSPITAL ENCOUNTER (OUTPATIENT)
Dept: WOUND CARE | Age: 59
Discharge: HOME | End: 2024-12-06
Attending: SURGERY

## 2024-12-09 NOTE — DISCHARGE INSTRUCTIONS
Visit Discharge/Physician Orders     Discharge condition: Stable     Assessment of pain at discharge: minimal     Anesthetic used: 4% lidocaine topically      Discharge to: Home     Left via:Private automobile     Accompanied by: self      ECF/HHA:  [facility] for supplies     Dressing Orders: Buttock right  wound - cleanse with normal saline and then apply alginate AG and cover with foam dressing. Change daily.                                 No soaking or tub bathing, use water proof bandage in shower.  Off load area with pillows      Treatment Orders: FOLLOW NUTRITIOUS DIET. CHOOSE FOODS HIGH IN PROTEIN -CHICKEN- FISH-AND EGGS,  CHOOSE FOODS HIGH IN VITAMIN C.   MULTIVITAMIN DAILY.       Culture obtained 12/13/2024     [facility] followup visit ________1 week Dr. Steve_____________________  (Please note your next appointment above and if you are unable to keep, kindly give a 24 hour notice. Thank you.)     Physician signature:__________________________        If you experience any of the following, please call the Wound Care Center during business hours:     * Increase in Pain  * Temperature over 101  * Increase in drainage from your wound  * Drainage with a foul odor  * Bleeding  * Increase in swelling  * Need for compression bandage changes due to slippage, breakthrough drainage.     If you need medical attention outside of the business hours of the Wound Care Centers please contact your PCP or go to the nearest emergency room.

## 2024-12-13 ENCOUNTER — HOSPITAL ENCOUNTER (OUTPATIENT)
Dept: WOUND CARE | Age: 59
Discharge: HOME | End: 2024-12-13
Attending: SURGERY
Payer: COMMERCIAL

## 2024-12-13 VITALS
TEMPERATURE: 97.6 F | BODY MASS INDEX: 35.44 KG/M2 | HEART RATE: 103 BPM | RESPIRATION RATE: 18 BRPM | DIASTOLIC BLOOD PRESSURE: 81 MMHG | SYSTOLIC BLOOD PRESSURE: 150 MMHG | WEIGHT: 200 LBS | HEIGHT: 63 IN

## 2024-12-13 DIAGNOSIS — K63.2 COLOCUTANEOUS FISTULA: Primary | ICD-10-CM

## 2024-12-13 DIAGNOSIS — L98.492 NON-PRESSURE CHRONIC ULCER OF SKIN OF OTHER SITES WITH FAT LAYER EXPOSED (HCC): ICD-10-CM

## 2024-12-13 PROCEDURE — 11042 DBRDMT SUBQ TIS 1ST 20SQCM/<: CPT

## 2024-12-13 PROCEDURE — 87070 CULTURE OTHR SPECIMN AEROBIC: CPT

## 2024-12-13 PROCEDURE — 87205 SMEAR GRAM STAIN: CPT

## 2024-12-13 RX ORDER — GENTAMICIN SULFATE 1 MG/G
OINTMENT TOPICAL ONCE
OUTPATIENT
Start: 2024-12-13 | End: 2024-12-13

## 2024-12-13 RX ORDER — SULFAMETHOXAZOLE AND TRIMETHOPRIM 800; 160 MG/1; MG/1
1 TABLET ORAL 2 TIMES DAILY
Qty: 14 TABLET | Refills: 0 | Status: SHIPPED | OUTPATIENT
Start: 2024-12-13 | End: 2024-12-20

## 2024-12-13 RX ORDER — MUPIROCIN 20 MG/G
OINTMENT TOPICAL ONCE
OUTPATIENT
Start: 2024-12-13 | End: 2024-12-13

## 2024-12-13 RX ORDER — LIDOCAINE HYDROCHLORIDE 20 MG/ML
JELLY TOPICAL ONCE
OUTPATIENT
Start: 2024-12-13 | End: 2024-12-13

## 2024-12-13 RX ORDER — TRIAMCINOLONE ACETONIDE 1 MG/G
OINTMENT TOPICAL ONCE
OUTPATIENT
Start: 2024-12-13 | End: 2024-12-13

## 2024-12-13 RX ORDER — SILVER SULFADIAZINE 10 MG/G
CREAM TOPICAL ONCE
OUTPATIENT
Start: 2024-12-13 | End: 2024-12-13

## 2024-12-13 RX ORDER — LIDOCAINE 50 MG/G
OINTMENT TOPICAL ONCE
OUTPATIENT
Start: 2024-12-13 | End: 2024-12-13

## 2024-12-13 RX ORDER — LIDOCAINE HYDROCHLORIDE 40 MG/ML
SOLUTION TOPICAL ONCE
OUTPATIENT
Start: 2024-12-13 | End: 2024-12-13

## 2024-12-13 RX ORDER — BETAMETHASONE DIPROPIONATE 0.5 MG/G
CREAM TOPICAL ONCE
OUTPATIENT
Start: 2024-12-13 | End: 2024-12-13

## 2024-12-13 RX ORDER — BACITRACIN ZINC AND POLYMYXIN B SULFATE 500; 1000 [USP'U]/G; [USP'U]/G
OINTMENT TOPICAL ONCE
OUTPATIENT
Start: 2024-12-13 | End: 2024-12-13

## 2024-12-13 RX ORDER — LIDOCAINE HYDROCHLORIDE 40 MG/ML
SOLUTION TOPICAL ONCE
Status: COMPLETED | OUTPATIENT
Start: 2024-12-13 | End: 2024-12-13

## 2024-12-13 RX ORDER — OXYCODONE AND ACETAMINOPHEN 5; 325 MG/1; MG/1
1 TABLET ORAL EVERY 6 HOURS PRN
Qty: 12 TABLET | Refills: 0 | Status: SHIPPED | OUTPATIENT
Start: 2024-12-13 | End: 2024-12-16

## 2024-12-13 RX ORDER — CLOBETASOL PROPIONATE 0.5 MG/G
OINTMENT TOPICAL ONCE
OUTPATIENT
Start: 2024-12-13 | End: 2024-12-13

## 2024-12-13 RX ORDER — LIDOCAINE 40 MG/G
CREAM TOPICAL ONCE
OUTPATIENT
Start: 2024-12-13 | End: 2024-12-13

## 2024-12-13 RX ORDER — NEOMYCIN/BACITRACIN/POLYMYXINB 3.5-400-5K
OINTMENT (GRAM) TOPICAL ONCE
OUTPATIENT
Start: 2024-12-13 | End: 2024-12-13

## 2024-12-13 RX ORDER — GINSENG 100 MG
CAPSULE ORAL ONCE
OUTPATIENT
Start: 2024-12-13 | End: 2024-12-13

## 2024-12-13 RX ORDER — SODIUM CHLOR/HYPOCHLOROUS ACID 0.033 %
SOLUTION, IRRIGATION IRRIGATION ONCE
OUTPATIENT
Start: 2024-12-13 | End: 2024-12-13

## 2024-12-13 RX ADMIN — LIDOCAINE HYDROCHLORIDE 10 ML: 40 SOLUTION TOPICAL at 08:34

## 2024-12-13 ASSESSMENT — PAIN DESCRIPTION - DESCRIPTORS: DESCRIPTORS: BURNING;PRESSURE

## 2024-12-13 ASSESSMENT — PAIN SCALES - GENERAL: PAINLEVEL_OUTOF10: 7

## 2024-12-13 NOTE — PROGRESS NOTES
Wound Healing Center Followup Visit Note    Referring Physician : Niall Ocnonor MD  Velma Mazariegos  MEDICAL RECORD NUMBER:  [id number]  AGE: 59 y.o.   GENDER: female  : 1965  EPISODE DATE:  2024    Subjective:     No chief complaint on file.     HISTORY of PRESENT ILLNESS HPI   Velma Mazariegos is a 59 y.o. female who presents today in regards to follow up evaluation and treatment of wound/ulcer.  That patient's past medical, family and social hx were reviewed and changes were made if present.    History of Wound Context:  The patient has had a wound of right buttock which was first noted approximately 24.  This has been treated IV antibiotic and drain. On their initial visit to the wound healing center, 10/24/2024 ,  the patient has noted that the wound has been improving.  The patient has not had similar previous wounds in the past.      Patient is diabetic without insulin therapy (Jardiance)  Patient denies history of heart attack, stroke, blood clots or anticoagulation therapy     Pt is not on abx at time of initial visit.    10/24/24  Patient presents to wound care for right buttock ulcer   Purulent drainage present possible tunneling from 12 o'clock position   Periwound erythema and generalized skin irritation present   Patient complains of pain at proximal area above wound   Wound debrided   Culture obtained   Iodoform packing and dry dressing         10/31/2024  -depth measures are misleading, there is not a true 0.4 improvement in depth, there was friable tissue at the base easily disturbed with currette and this wound sounds 1.2cm beyond last week.  While the surface area is down, the undermining from 0900 to 1300 is 0.5cm to 1cm.  -the patient has trouble packing the wound given the location... since this is worsening and she has some standard of care since August / 2024 without resolution of the wound, it would be reasonable to use YOL[facility] for her, allowing us

## 2024-12-13 NOTE — PROGRESS NOTES
Wound Care Supplies      Supply Company:     [facility] [address] [place] [address]  p: [phone] f: [phone]     Ordering Center:     [facility]  [address] [place] [id number]  [phone]  WOUND CARE Dept: [phone]   FAX NUMBER [phone]    Patient Information:      Velma Fierro  [address] [facility] [address]   [phone]   : 1965  AGE: 59 y.o.     GENDER: female   EPISODE DATE: 2024    Insurance:      PRIMARY INSURANCE:  Plan: [facility] [place] MEDICAID  Coverage: [employer]  Effective Date: 2015  Group Number: [unfilled]  Subscriber Number: [id number] - (Medicaid Managed)    Payer/Plan Subscr  Sex Relation Sub. Ins. ID Effective Group Num   1. [employer] - * VELMA FIERRO 1965 Female Self [id number] 9/1/15 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)    Relevant Medications    sulfamethoxazole-trimethoprim (BACTRIM DS;SEPTRA DS) 800-160 MG per tablet    oxyCODONE-acetaminophen (PERCOCET) 5-325 MG per tablet       ICD 10 Code: L98.492    WOUNDS REQUIRING DRESSING SUPPLIES:     Wound 10/24/24 Buttocks Right #1  right buttock (Active)   Wound Image   24 1315   Wound Etiology Other 10/24/24 1325   Dressing Status New dressing applied 24   Wound Cleansed Cleansed with saline 24   Dressing/Treatment Alginate with Ag;Silicone border 24 09   Offloading for Diabetic Foot Ulcers Offloading ordered 24 1353   Wound Length (cm) 0.5 cm 24 0817   Wound Width (cm) 0.5 cm 24 0817   Wound Depth (cm) 0.2 cm 24 0817

## 2024-12-14 LAB
MICROORGANISM SPEC CULT: NO GROWTH
MICROORGANISM/AGENT SPEC: NORMAL
SPECIMEN DESCRIPTION: NORMAL

## 2024-12-16 ENCOUNTER — TELEPHONE (OUTPATIENT)
Dept: SURGERY | Age: 59
End: 2024-12-16

## 2024-12-16 NOTE — TELEPHONE ENCOUNTER
11/21/24 colonoscopy report received, see media.  No pathology.   Electronically signed by Nancy Wright RN on 12/16/2024 at 1:24 PM

## 2024-12-17 NOTE — DISCHARGE INSTRUCTIONS
Visit Discharge/Physician Orders     Discharge condition: Stable     Assessment of pain at discharge: minimal     Anesthetic used: 4% lidocaine topically      Discharge to: Home     Left via:Private automobile     Accompanied by: self      ECF/HHA:  [facility] for supplies     Dressing Orders: Buttock right  wound - cleanse with normal saline and then apply alginate AG and cover with foam dressing. Change daily.                                 No soaking or tub bathing, use water proof bandage in shower.  Off load area with pillows      Treatment Orders: FOLLOW NUTRITIOUS DIET. CHOOSE FOODS HIGH IN PROTEIN -CHICKEN- FISH-AND EGGS,  CHOOSE FOODS HIGH IN VITAMIN C.   MULTIVITAMIN DAILY.       Culture obtained 12/13/2024     [facility] followup visit ________1 week Dr. Steve_____________________  (Please note your next appointment above and if you are unable to keep, kindly give a 24 hour notice. Thank you.)     Physician signature:__________________________        If you experience any of the following, please call the Wound Care Center during business hours:     * Increase in Pain  * Temperature over 101  * Increase in drainage from your wound  * Drainage with a foul odor  * Bleeding  * Increase in swelling  * Need for compression bandage changes due to slippage, breakthrough drainage.     If you need medical attention outside of the business hours of the Wound Care Centers please contact your PCP or go to the nearest emergency room.

## 2024-12-20 ENCOUNTER — HOSPITAL ENCOUNTER (OUTPATIENT)
Dept: WOUND CARE | Age: 59
Discharge: HOME | End: 2024-12-20
Attending: SURGERY

## 2024-12-23 NOTE — DISCHARGE INSTRUCTIONS
Visit Discharge/Physician Orders     Discharge condition: Stable     Assessment of pain at discharge: minimal     Anesthetic used: 4% lidocaine topically      Discharge to: Home     Left via:Private automobile     Accompanied by: self      ECF/HHA:  [facility] for supplies     Dressing Orders: Buttock right  wound - cleanse with normal saline and then apply alginate AG and cover with foam dressing. Change daily.                                 No soaking or tub bathing, use water proof bandage in shower.  Off load area with pillows      Treatment Orders: FOLLOW NUTRITIOUS DIET. CHOOSE FOODS HIGH IN PROTEIN -CHICKEN- FISH-AND EGGS,  CHOOSE FOODS HIGH IN VITAMIN C.   MULTIVITAMIN DAILY.            [facility] followup visit ________2 weeks Dr. Steve_____________________  (Please note your next appointment above and if you are unable to keep, kindly give a 24 hour notice. Thank you.)     Physician signature:__________________________        If you experience any of the following, please call the Wound Care Center during business hours:     * Increase in Pain  * Temperature over 101  * Increase in drainage from your wound  * Drainage with a foul odor  * Bleeding  * Increase in swelling  * Need for compression bandage changes due to slippage, breakthrough drainage.     If you need medical attention outside of the business hours of the Wound Care Centers please contact your PCP or go to the nearest emergency room.

## 2024-12-26 ENCOUNTER — HOSPITAL ENCOUNTER (OUTPATIENT)
Dept: PREADMISSION TESTING | Age: 59
Discharge: HOME | End: 2024-12-26
Payer: COMMERCIAL

## 2024-12-26 VITALS
TEMPERATURE: 98.1 F | DIASTOLIC BLOOD PRESSURE: 77 MMHG | BODY MASS INDEX: 35.08 KG/M2 | RESPIRATION RATE: 20 BRPM | WEIGHT: 198 LBS | SYSTOLIC BLOOD PRESSURE: 117 MMHG | OXYGEN SATURATION: 100 % | HEART RATE: 96 BPM | HEIGHT: 63 IN

## 2024-12-26 DIAGNOSIS — Z01.818 PRE-OP TESTING: Primary | ICD-10-CM

## 2024-12-26 LAB
ANION GAP SERPL CALCULATED.3IONS-SCNC: 13 MMOL/L (ref 7–16)
BASOPHILS # BLD: 0.02 K/UL (ref 0–0.2)
BASOPHILS NFR BLD: 0 % (ref 0–2)
BUN SERPL-MCNC: 13 MG/DL (ref 6–23)
CALCIUM SERPL-MCNC: 8.8 MG/DL (ref 8.6–10.2)
CHLORIDE SERPL-SCNC: 101 MMOL/L (ref 98–107)
CO2 SERPL-SCNC: 24 MMOL/L (ref 22–29)
CREAT SERPL-MCNC: 0.8 MG/DL (ref 0.5–1)
EOSINOPHIL # BLD: 0.2 K/UL (ref 0.05–0.5)
EOSINOPHILS RELATIVE PERCENT: 3 % (ref 0–6)
ERYTHROCYTE [DISTWIDTH] IN BLOOD BY AUTOMATED COUNT: 16.8 % (ref 11.5–15)
GFR, ESTIMATED: 79 ML/MIN/1.73M2
GLUCOSE SERPL-MCNC: 259 MG/DL (ref 74–99)
HCT VFR BLD AUTO: 42.6 % (ref 34–48)
HGB BLD-MCNC: 13.6 G/DL (ref 11.5–15.5)
IMM GRANULOCYTES # BLD AUTO: 0.04 K/UL (ref 0–0.58)
IMM GRANULOCYTES NFR BLD: 1 % (ref 0–5)
LYMPHOCYTES NFR BLD: 1.91 K/UL (ref 1.5–4)
LYMPHOCYTES RELATIVE PERCENT: 27 % (ref 20–42)
MCH RBC QN AUTO: 28.4 PG (ref 26–35)
MCHC RBC AUTO-ENTMCNC: 31.9 G/DL (ref 32–34.5)
MCV RBC AUTO: 88.9 FL (ref 80–99.9)
MONOCYTES NFR BLD: 0.44 K/UL (ref 0.1–0.95)
MONOCYTES NFR BLD: 6 % (ref 2–12)
NEUTROPHILS NFR BLD: 63 % (ref 43–80)
NEUTS SEG NFR BLD: 4.53 K/UL (ref 1.8–7.3)
PLATELET # BLD AUTO: 254 K/UL (ref 130–450)
PMV BLD AUTO: 8.5 FL (ref 7–12)
POTASSIUM SERPL-SCNC: 4.2 MMOL/L (ref 3.5–5)
RBC # BLD AUTO: 4.79 M/UL (ref 3.5–5.5)
SODIUM SERPL-SCNC: 138 MMOL/L (ref 132–146)
WBC OTHER # BLD: 7.1 K/UL (ref 4.5–11.5)

## 2024-12-26 PROCEDURE — 87081 CULTURE SCREEN ONLY: CPT

## 2024-12-26 PROCEDURE — 85025 COMPLETE CBC W/AUTO DIFF WBC: CPT

## 2024-12-26 PROCEDURE — 80048 BASIC METABOLIC PNL TOTAL CA: CPT

## 2024-12-26 NOTE — PROGRESS NOTES
[facility]                                                                                                                    PRE OP INSTRUCTIONS FOR  Velma Mazariegos        Date: 12/26/2024    Date of surgery: 1/2/24   Arrival Time: Hospital will call you between 5pm and 7pm with your final arrival time for surgery    You may drink clear liquids up until 2 hours before your procedure. Clear liquids include water, black coffee, and apple juice.     Take the following medications with a small sip of water on the morning of Surgery: synthroid, ativan if needed, please use and bring your inhalers with you.      Diabetics may take 1/2 evening dose of insulin but none after midnight.  If you feel symptomatic or low blood sugar morning of surgery drink 1-2 ounces of apple juice only.    Diabetic patients- SGLT2 inhibitors (Farxiga, Jardiance) must be discontinued for 3-4 days before surgery. GLP-1 agonists (Trulicity, Ozempic, Victoza) weekly injectables must be held for one week prior to surgery.  Last day Jardiance 12/28    Aspirin, Ibuprofen, Advil, Naproxen, Vitamin E and other Anti-inflammatory products should be stopped  before surgery  as directed by your physician.  Take Tylenol only unless instructed otherwise by your surgeon. Stop all herbal supplements 5 days pre op.       Do not smoke,use illicit drugs and do not drink any alcoholic beverages 24 hours prior to surgery.    You may brush your teeth the morning of surgery.      You MUST make arrangements for a responsible adult to take you home after your surgery. You will not be allowed to leave alone or drive yourself home.  It is strongly suggested someone stay with you the first 24 hrs. Your surgery will be cancelled if you do not have a ride home.    Please wear simple, loose fitting clothing to the hospital.  Do not bring valuables (money, credit cards, checkbooks, etc.) Do not wear any makeup (including no eye makeup) or nail

## 2024-12-27 ENCOUNTER — HOSPITAL ENCOUNTER (OUTPATIENT)
Dept: WOUND CARE | Age: 59
Discharge: HOME | End: 2024-12-27
Attending: SURGERY
Payer: COMMERCIAL

## 2024-12-27 VITALS
HEART RATE: 92 BPM | HEIGHT: 63 IN | WEIGHT: 198 LBS | DIASTOLIC BLOOD PRESSURE: 68 MMHG | BODY MASS INDEX: 35.08 KG/M2 | SYSTOLIC BLOOD PRESSURE: 120 MMHG | RESPIRATION RATE: 20 BRPM | TEMPERATURE: 96.2 F

## 2024-12-27 DIAGNOSIS — L98.492 NON-PRESSURE CHRONIC ULCER OF SKIN OF OTHER SITES WITH FAT LAYER EXPOSED (HCC): Primary | ICD-10-CM

## 2024-12-27 LAB
MICROORGANISM SPEC CULT: NORMAL
SPECIMEN DESCRIPTION: NORMAL

## 2024-12-27 PROCEDURE — 11042 DBRDMT SUBQ TIS 1ST 20SQCM/<: CPT

## 2024-12-27 RX ORDER — SILVER SULFADIAZINE 10 MG/G
CREAM TOPICAL ONCE
OUTPATIENT
Start: 2024-12-27 | End: 2024-12-27

## 2024-12-27 RX ORDER — GINSENG 100 MG
CAPSULE ORAL ONCE
OUTPATIENT
Start: 2024-12-27 | End: 2024-12-27

## 2024-12-27 RX ORDER — CLOBETASOL PROPIONATE 0.5 MG/G
OINTMENT TOPICAL ONCE
OUTPATIENT
Start: 2024-12-27 | End: 2024-12-27

## 2024-12-27 RX ORDER — LIDOCAINE HYDROCHLORIDE 20 MG/ML
JELLY TOPICAL ONCE
OUTPATIENT
Start: 2024-12-27 | End: 2024-12-27

## 2024-12-27 RX ORDER — LIDOCAINE 50 MG/G
OINTMENT TOPICAL ONCE
OUTPATIENT
Start: 2024-12-27 | End: 2024-12-27

## 2024-12-27 RX ORDER — TRIAMCINOLONE ACETONIDE 1 MG/G
OINTMENT TOPICAL ONCE
OUTPATIENT
Start: 2024-12-27 | End: 2024-12-27

## 2024-12-27 RX ORDER — LIDOCAINE 40 MG/G
CREAM TOPICAL ONCE
OUTPATIENT
Start: 2024-12-27 | End: 2024-12-27

## 2024-12-27 RX ORDER — LIDOCAINE HYDROCHLORIDE 40 MG/ML
SOLUTION TOPICAL ONCE
OUTPATIENT
Start: 2024-12-27 | End: 2024-12-27

## 2024-12-27 RX ORDER — NEOMYCIN/BACITRACIN/POLYMYXINB 3.5-400-5K
OINTMENT (GRAM) TOPICAL ONCE
OUTPATIENT
Start: 2024-12-27 | End: 2024-12-27

## 2024-12-27 RX ORDER — GENTAMICIN SULFATE 1 MG/G
OINTMENT TOPICAL ONCE
OUTPATIENT
Start: 2024-12-27 | End: 2024-12-27

## 2024-12-27 RX ORDER — BETAMETHASONE DIPROPIONATE 0.5 MG/G
CREAM TOPICAL ONCE
OUTPATIENT
Start: 2024-12-27 | End: 2024-12-27

## 2024-12-27 RX ORDER — LIDOCAINE HYDROCHLORIDE 40 MG/ML
SOLUTION TOPICAL ONCE
Status: COMPLETED | OUTPATIENT
Start: 2024-12-27 | End: 2024-12-27

## 2024-12-27 RX ORDER — BACITRACIN ZINC AND POLYMYXIN B SULFATE 500; 1000 [USP'U]/G; [USP'U]/G
OINTMENT TOPICAL ONCE
OUTPATIENT
Start: 2024-12-27 | End: 2024-12-27

## 2024-12-27 RX ORDER — SODIUM CHLOR/HYPOCHLOROUS ACID 0.033 %
SOLUTION, IRRIGATION IRRIGATION ONCE
OUTPATIENT
Start: 2024-12-27 | End: 2024-12-27

## 2024-12-27 RX ORDER — MUPIROCIN 20 MG/G
OINTMENT TOPICAL ONCE
OUTPATIENT
Start: 2024-12-27 | End: 2024-12-27

## 2024-12-27 RX ADMIN — LIDOCAINE HYDROCHLORIDE 8 ML: 40 SOLUTION TOPICAL at 08:56

## 2024-12-27 NOTE — PROGRESS NOTES
Post-Procedure Width (cm) 0.7 cm 12/27/24 0931   Post-Procedure Depth (cm) 0.5 cm 12/27/24 0931   Post-Procedure Surface Area (cm^2) 0.21 cm^2 12/27/24 0931   Post-Procedure Volume (cm^3) 0.105 cm^3 12/27/24 0931   Distance Tunneling (cm) 0.7 cm 10/24/24 1345   Tunneling Position ___ O'Clock 0.5 12/27/24 0849   Undermining Starts ___ O'Clock @3 12/27/24 0849   Undermining Ends___ O'Clock 12 11/07/24 0821   Undermining Maxium Distance (cm) 0.4 @ 12 11/07/24 0821   Wound Assessment Pink/red;Fibrin 12/27/24 0849   Drainage Amount Large (50-75% saturated) 12/27/24 0849   Drainage Description Serous;Yellow 12/27/24 0849   Odor None 12/27/24 0849   Laxmi-wound Assessment Intact 12/27/24 0849   Number of days: 63          Procedure Note  Indications:  Based on my examination of this patient's wound(s)/ulcer(s) today, debridement is required to promote healing and evaluate the wound base.    Performed by: Mc Steve DO    Consent obtained:  Yes    Time out taken:  Yes    Pain Control: Anesthetic  Anesthetic: 4% Lidocaine Liquid Topical     Debridement:Excisional Debridement    Using curette the wound(s)/ulcer(s) was/were sharply debrided down through and including the removal of subcutaneous tissue.        Devitalized Tissue Debrided:  fibrin, slough, and exudate to stimulate bleeding to promote healing, post debridement good bleeding base and wound edges noted    Wound/Ulcer #: 1    Percent of Wound/Ulcer Debrided: 100%    Total Surface Area Debrided:   0.12sq cm (from 1.1sq cm (from 0.2sq cm (from 0.2sq cm (from  0.09sq cm (from  0.63 sq cm ) ) ) ) ) sq cm     Estimated Blood Loss:  Minimal  Hemostasis Achieved:  by pressure    Procedural Pain:  2  / 10   Post Procedural Pain:  0 / 10     Response to treatment:  Well tolerated by patient.       Plan:   Treatment Note please see attached Discharge Instructions    Written patient dismissal instructions given to patient and signed by patient or POA.         Discharge

## 2025-01-02 ENCOUNTER — ANESTHESIA (OUTPATIENT)
Dept: OPERATING ROOM | Age: 60
End: 2025-01-02
Payer: COMMERCIAL

## 2025-01-02 ENCOUNTER — HOSPITAL ENCOUNTER (INPATIENT)
Age: 60
LOS: 12 days | Discharge: NURSING, REHAB OR LONG-TERM CARE | DRG: 231 | End: 2025-01-14
Attending: SURGERY | Admitting: SURGERY
Payer: COMMERCIAL

## 2025-01-02 ENCOUNTER — ANESTHESIA EVENT (OUTPATIENT)
Dept: OPERATING ROOM | Age: 60
End: 2025-01-02
Payer: COMMERCIAL

## 2025-01-02 DIAGNOSIS — R52 PAIN: ICD-10-CM

## 2025-01-02 DIAGNOSIS — K63.2 COLOCUTANEOUS FISTULA: ICD-10-CM

## 2025-01-02 DIAGNOSIS — K57.20 DIVERTICULITIS OF LARGE INTESTINE WITH PERFORATION AND ABSCESS: ICD-10-CM

## 2025-01-02 DIAGNOSIS — G89.18 POSTOPERATIVE PAIN: Primary | ICD-10-CM

## 2025-01-02 PROBLEM — K57.32 DIVERTICULITIS LARGE INTESTINE W/O PERFORATION OR ABSCESS W/O BLEEDING: Status: ACTIVE | Noted: 2025-01-02

## 2025-01-02 LAB
GLUCOSE BLD-MCNC: 152 MG/DL (ref 74–99)
GLUCOSE BLD-MCNC: 225 MG/DL (ref 74–99)
GLUCOSE BLD-MCNC: 247 MG/DL (ref 74–99)

## 2025-01-02 PROCEDURE — 49020 DRAINAGE ABDOM ABSCESS OPEN: CPT | Performed by: SURGERY

## 2025-01-02 PROCEDURE — 2580000003 HC RX 258: Performed by: SURGERY

## 2025-01-02 PROCEDURE — 88307 TISSUE EXAM BY PATHOLOGIST: CPT

## 2025-01-02 PROCEDURE — 6360000002 HC RX W HCPCS

## 2025-01-02 PROCEDURE — 6370000000 HC RX 637 (ALT 250 FOR IP): Performed by: SURGERY

## 2025-01-02 PROCEDURE — 2500000003 HC RX 250 WO HCPCS: Performed by: SURGERY

## 2025-01-02 PROCEDURE — 0DBN4ZX EXCISION OF SIGMOID COLON, PERCUTANEOUS ENDOSCOPIC APPROACH, DIAGNOSTIC: ICD-10-PCS | Performed by: SURGERY

## 2025-01-02 PROCEDURE — S2900 ROBOTIC SURGICAL SYSTEM: HCPCS | Performed by: SURGERY

## 2025-01-02 PROCEDURE — 2709999900 HC NON-CHARGEABLE SUPPLY: Performed by: SURGERY

## 2025-01-02 PROCEDURE — 2500000003 HC RX 250 WO HCPCS

## 2025-01-02 PROCEDURE — 44207 L COLECTOMY/COLOPROCTOSTOMY: CPT | Performed by: SURGERY

## 2025-01-02 PROCEDURE — 6360000002 HC RX W HCPCS: Performed by: SURGERY

## 2025-01-02 PROCEDURE — 6360000002 HC RX W HCPCS: Performed by: NURSE ANESTHETIST, CERTIFIED REGISTERED

## 2025-01-02 PROCEDURE — 2580000003 HC RX 258

## 2025-01-02 PROCEDURE — 8E0W4CZ ROBOTIC ASSISTED PROCEDURE OF TRUNK REGION, PERCUTANEOUS ENDOSCOPIC APPROACH: ICD-10-PCS | Performed by: SURGERY

## 2025-01-02 PROCEDURE — C1729 CATH, DRAINAGE: HCPCS | Performed by: SURGERY

## 2025-01-02 PROCEDURE — 2720000010 HC SURG SUPPLY STERILE: Performed by: SURGERY

## 2025-01-02 PROCEDURE — 3700000001 HC ADD 15 MINUTES (ANESTHESIA): Performed by: SURGERY

## 2025-01-02 PROCEDURE — 0DNN4ZZ RELEASE SIGMOID COLON, PERCUTANEOUS ENDOSCOPIC APPROACH: ICD-10-PCS | Performed by: SURGERY

## 2025-01-02 PROCEDURE — 3600000019 HC SURGERY ROBOT ADDTL 15MIN: Performed by: SURGERY

## 2025-01-02 PROCEDURE — 0DBP4ZZ EXCISION OF RECTUM, PERCUTANEOUS ENDOSCOPIC APPROACH: ICD-10-PCS | Performed by: SURGERY

## 2025-01-02 PROCEDURE — 44213 LAP MOBIL SPLENIC FL ADD-ON: CPT | Performed by: SURGERY

## 2025-01-02 PROCEDURE — 0W9J4ZZ DRAINAGE OF PELVIC CAVITY, PERCUTANEOUS ENDOSCOPIC APPROACH: ICD-10-PCS | Performed by: SURGERY

## 2025-01-02 PROCEDURE — 6360000002 HC RX W HCPCS: Performed by: ANESTHESIOLOGY

## 2025-01-02 PROCEDURE — 3600000009 HC SURGERY ROBOT BASE: Performed by: SURGERY

## 2025-01-02 PROCEDURE — 6370000000 HC RX 637 (ALT 250 FOR IP)

## 2025-01-02 PROCEDURE — 82962 GLUCOSE BLOOD TEST: CPT

## 2025-01-02 PROCEDURE — 2500000003 HC RX 250 WO HCPCS: Performed by: NURSE ANESTHETIST, CERTIFIED REGISTERED

## 2025-01-02 PROCEDURE — 3700000000 HC ANESTHESIA ATTENDED CARE: Performed by: SURGERY

## 2025-01-02 PROCEDURE — 7100000000 HC PACU RECOVERY - FIRST 15 MIN: Performed by: SURGERY

## 2025-01-02 PROCEDURE — 7100000001 HC PACU RECOVERY - ADDTL 15 MIN: Performed by: SURGERY

## 2025-01-02 PROCEDURE — 1200000000 HC SEMI PRIVATE

## 2025-01-02 RX ORDER — HYDRALAZINE HYDROCHLORIDE 20 MG/ML
10 INJECTION INTRAMUSCULAR; INTRAVENOUS
Status: DISCONTINUED | OUTPATIENT
Start: 2025-01-02 | End: 2025-01-02 | Stop reason: HOSPADM

## 2025-01-02 RX ORDER — SODIUM CHLORIDE 0.9 % (FLUSH) 0.9 %
5-40 SYRINGE (ML) INJECTION EVERY 12 HOURS SCHEDULED
Status: DISCONTINUED | OUTPATIENT
Start: 2025-01-02 | End: 2025-01-02 | Stop reason: HOSPADM

## 2025-01-02 RX ORDER — MEPERIDINE HYDROCHLORIDE 25 MG/ML
12.5 INJECTION INTRAMUSCULAR; INTRAVENOUS; SUBCUTANEOUS EVERY 5 MIN PRN
Status: DISCONTINUED | OUTPATIENT
Start: 2025-01-02 | End: 2025-01-02 | Stop reason: HOSPADM

## 2025-01-02 RX ORDER — SODIUM CHLORIDE 0.9 % (FLUSH) 0.9 %
5-40 SYRINGE (ML) INJECTION PRN
Status: DISCONTINUED | OUTPATIENT
Start: 2025-01-02 | End: 2025-01-02 | Stop reason: HOSPADM

## 2025-01-02 RX ORDER — MIDAZOLAM HYDROCHLORIDE 1 MG/ML
INJECTION, SOLUTION INTRAMUSCULAR; INTRAVENOUS
Status: DISCONTINUED | OUTPATIENT
Start: 2025-01-02 | End: 2025-01-02 | Stop reason: SDUPTHER

## 2025-01-02 RX ORDER — NEOSTIGMINE METHYLSULFATE 1 MG/ML
INJECTION, SOLUTION INTRAVENOUS
Status: DISCONTINUED | OUTPATIENT
Start: 2025-01-02 | End: 2025-01-02 | Stop reason: SDUPTHER

## 2025-01-02 RX ORDER — ONDANSETRON 2 MG/ML
4 INJECTION INTRAMUSCULAR; INTRAVENOUS EVERY 6 HOURS PRN
Status: DISCONTINUED | OUTPATIENT
Start: 2025-01-02 | End: 2025-01-14 | Stop reason: HOSPADM

## 2025-01-02 RX ORDER — SODIUM CHLORIDE 9 MG/ML
INJECTION, SOLUTION INTRAVENOUS CONTINUOUS
Status: DISCONTINUED | OUTPATIENT
Start: 2025-01-02 | End: 2025-01-02

## 2025-01-02 RX ORDER — SODIUM CHLORIDE 9 MG/ML
INJECTION, SOLUTION INTRAVENOUS PRN
Status: DISCONTINUED | OUTPATIENT
Start: 2025-01-02 | End: 2025-01-02 | Stop reason: HOSPADM

## 2025-01-02 RX ORDER — INDOCYANINE GREEN AND WATER 25 MG
KIT INJECTION
Status: DISCONTINUED | OUTPATIENT
Start: 2025-01-02 | End: 2025-01-02 | Stop reason: SDUPTHER

## 2025-01-02 RX ORDER — SODIUM CHLORIDE 0.9 % (FLUSH) 0.9 %
5-40 SYRINGE (ML) INJECTION EVERY 12 HOURS SCHEDULED
Status: DISCONTINUED | OUTPATIENT
Start: 2025-01-02 | End: 2025-01-14 | Stop reason: HOSPADM

## 2025-01-02 RX ORDER — IPRATROPIUM BROMIDE AND ALBUTEROL SULFATE 2.5; .5 MG/3ML; MG/3ML
1 SOLUTION RESPIRATORY (INHALATION)
Status: DISCONTINUED | OUTPATIENT
Start: 2025-01-02 | End: 2025-01-02 | Stop reason: HOSPADM

## 2025-01-02 RX ORDER — GLYCOPYRROLATE 1 MG/5 ML
SYRINGE (ML) INTRAVENOUS
Status: DISCONTINUED | OUTPATIENT
Start: 2025-01-02 | End: 2025-01-02 | Stop reason: SDUPTHER

## 2025-01-02 RX ORDER — OXYCODONE HYDROCHLORIDE 5 MG/1
10 TABLET ORAL EVERY 4 HOURS PRN
Status: DISCONTINUED | OUTPATIENT
Start: 2025-01-02 | End: 2025-01-14 | Stop reason: HOSPADM

## 2025-01-02 RX ORDER — FENTANYL CITRATE 50 UG/ML
INJECTION, SOLUTION INTRAMUSCULAR; INTRAVENOUS
Status: DISCONTINUED | OUTPATIENT
Start: 2025-01-02 | End: 2025-01-02 | Stop reason: SDUPTHER

## 2025-01-02 RX ORDER — METHOCARBAMOL 100 MG/ML
1000 INJECTION, SOLUTION INTRAMUSCULAR; INTRAVENOUS EVERY 8 HOURS
Status: DISPENSED | OUTPATIENT
Start: 2025-01-02 | End: 2025-01-04

## 2025-01-02 RX ORDER — ACETAMINOPHEN 325 MG/1
650 TABLET ORAL EVERY 6 HOURS
Status: DISCONTINUED | OUTPATIENT
Start: 2025-01-02 | End: 2025-01-14 | Stop reason: HOSPADM

## 2025-01-02 RX ORDER — SODIUM CHLORIDE 9 MG/ML
INJECTION, SOLUTION INTRAVENOUS CONTINUOUS
Status: DISCONTINUED | OUTPATIENT
Start: 2025-01-02 | End: 2025-01-02 | Stop reason: HOSPADM

## 2025-01-02 RX ORDER — MIDAZOLAM HYDROCHLORIDE 1 MG/ML
2 INJECTION, SOLUTION INTRAMUSCULAR; INTRAVENOUS
Status: DISCONTINUED | OUTPATIENT
Start: 2025-01-02 | End: 2025-01-02 | Stop reason: HOSPADM

## 2025-01-02 RX ORDER — SODIUM CHLORIDE, SODIUM LACTATE, POTASSIUM CHLORIDE, CALCIUM CHLORIDE 600; 310; 30; 20 MG/100ML; MG/100ML; MG/100ML; MG/100ML
INJECTION, SOLUTION INTRAVENOUS CONTINUOUS
Status: DISCONTINUED | OUTPATIENT
Start: 2025-01-02 | End: 2025-01-03

## 2025-01-02 RX ORDER — SODIUM CHLORIDE 9 MG/ML
INJECTION, SOLUTION INTRAVENOUS PRN
Status: DISCONTINUED | OUTPATIENT
Start: 2025-01-02 | End: 2025-01-14 | Stop reason: HOSPADM

## 2025-01-02 RX ORDER — ROCURONIUM BROMIDE 10 MG/ML
INJECTION, SOLUTION INTRAVENOUS
Status: DISCONTINUED | OUTPATIENT
Start: 2025-01-02 | End: 2025-01-02 | Stop reason: SDUPTHER

## 2025-01-02 RX ORDER — BISACODYL 5 MG/1
5 TABLET, DELAYED RELEASE ORAL DAILY
Status: DISCONTINUED | OUTPATIENT
Start: 2025-01-02 | End: 2025-01-14 | Stop reason: HOSPADM

## 2025-01-02 RX ORDER — ENOXAPARIN SODIUM 100 MG/ML
40 INJECTION SUBCUTANEOUS DAILY
Status: DISCONTINUED | OUTPATIENT
Start: 2025-01-03 | End: 2025-01-14 | Stop reason: HOSPADM

## 2025-01-02 RX ORDER — OXYCODONE HYDROCHLORIDE 5 MG/1
5 TABLET ORAL EVERY 4 HOURS PRN
Status: DISCONTINUED | OUTPATIENT
Start: 2025-01-02 | End: 2025-01-14 | Stop reason: HOSPADM

## 2025-01-02 RX ORDER — LORAZEPAM 1 MG/1
1 TABLET ORAL NIGHTLY PRN
Status: DISCONTINUED | OUTPATIENT
Start: 2025-01-02 | End: 2025-01-14 | Stop reason: HOSPADM

## 2025-01-02 RX ORDER — DIPHENHYDRAMINE HYDROCHLORIDE 50 MG/ML
12.5 INJECTION INTRAMUSCULAR; INTRAVENOUS
Status: DISCONTINUED | OUTPATIENT
Start: 2025-01-02 | End: 2025-01-02 | Stop reason: HOSPADM

## 2025-01-02 RX ORDER — FENTANYL CITRATE 0.05 MG/ML
25 INJECTION, SOLUTION INTRAMUSCULAR; INTRAVENOUS EVERY 5 MIN PRN
Status: DISCONTINUED | OUTPATIENT
Start: 2025-01-02 | End: 2025-01-02 | Stop reason: HOSPADM

## 2025-01-02 RX ORDER — BUPIVACAINE HYDROCHLORIDE AND EPINEPHRINE 2.5; 5 MG/ML; UG/ML
INJECTION, SOLUTION EPIDURAL; INFILTRATION; INTRACAUDAL; PERINEURAL PRN
Status: DISCONTINUED | OUTPATIENT
Start: 2025-01-02 | End: 2025-01-02 | Stop reason: ALTCHOICE

## 2025-01-02 RX ORDER — LABETALOL HYDROCHLORIDE 5 MG/ML
10 INJECTION, SOLUTION INTRAVENOUS
Status: DISCONTINUED | OUTPATIENT
Start: 2025-01-02 | End: 2025-01-02 | Stop reason: HOSPADM

## 2025-01-02 RX ORDER — KETOROLAC TROMETHAMINE 30 MG/ML
30 INJECTION, SOLUTION INTRAMUSCULAR; INTRAVENOUS ONCE
Status: DISCONTINUED | OUTPATIENT
Start: 2025-01-02 | End: 2025-01-02 | Stop reason: HOSPADM

## 2025-01-02 RX ORDER — DEXAMETHASONE SODIUM PHOSPHATE 10 MG/ML
INJECTION, SOLUTION INTRAMUSCULAR; INTRAVENOUS
Status: DISCONTINUED | OUTPATIENT
Start: 2025-01-02 | End: 2025-01-02 | Stop reason: SDUPTHER

## 2025-01-02 RX ORDER — GLUCAGON 1 MG/ML
1 KIT INJECTION PRN
Status: DISCONTINUED | OUTPATIENT
Start: 2025-01-02 | End: 2025-01-14 | Stop reason: HOSPADM

## 2025-01-02 RX ORDER — DULOXETIN HYDROCHLORIDE 60 MG/1
60 CAPSULE, DELAYED RELEASE ORAL NIGHTLY
Status: DISCONTINUED | OUTPATIENT
Start: 2025-01-02 | End: 2025-01-14 | Stop reason: HOSPADM

## 2025-01-02 RX ORDER — METHOCARBAMOL 100 MG/ML
1000 INJECTION, SOLUTION INTRAMUSCULAR; INTRAVENOUS ONCE
Status: COMPLETED | OUTPATIENT
Start: 2025-01-02 | End: 2025-01-02

## 2025-01-02 RX ORDER — ONDANSETRON 4 MG/1
4 TABLET, ORALLY DISINTEGRATING ORAL EVERY 8 HOURS PRN
Status: DISCONTINUED | OUTPATIENT
Start: 2025-01-02 | End: 2025-01-14 | Stop reason: HOSPADM

## 2025-01-02 RX ORDER — HALOPERIDOL 5 MG/ML
1 INJECTION INTRAMUSCULAR ONCE
Status: COMPLETED | OUTPATIENT
Start: 2025-01-02 | End: 2025-01-02

## 2025-01-02 RX ORDER — PROPOFOL 10 MG/ML
INJECTION, EMULSION INTRAVENOUS
Status: DISCONTINUED | OUTPATIENT
Start: 2025-01-02 | End: 2025-01-02 | Stop reason: SDUPTHER

## 2025-01-02 RX ORDER — SODIUM CHLORIDE 0.9 % (FLUSH) 0.9 %
5-40 SYRINGE (ML) INJECTION PRN
Status: DISCONTINUED | OUTPATIENT
Start: 2025-01-02 | End: 2025-01-14 | Stop reason: HOSPADM

## 2025-01-02 RX ORDER — DROPERIDOL 2.5 MG/ML
0.62 INJECTION, SOLUTION INTRAMUSCULAR; INTRAVENOUS
Status: DISCONTINUED | OUTPATIENT
Start: 2025-01-02 | End: 2025-01-02 | Stop reason: HOSPADM

## 2025-01-02 RX ORDER — NALOXONE HYDROCHLORIDE 0.4 MG/ML
INJECTION, SOLUTION INTRAMUSCULAR; INTRAVENOUS; SUBCUTANEOUS PRN
Status: DISCONTINUED | OUTPATIENT
Start: 2025-01-02 | End: 2025-01-02 | Stop reason: HOSPADM

## 2025-01-02 RX ORDER — DEXTROSE MONOHYDRATE 100 MG/ML
INJECTION, SOLUTION INTRAVENOUS CONTINUOUS PRN
Status: DISCONTINUED | OUTPATIENT
Start: 2025-01-02 | End: 2025-01-14 | Stop reason: HOSPADM

## 2025-01-02 RX ORDER — KETOROLAC TROMETHAMINE 30 MG/ML
30 INJECTION, SOLUTION INTRAMUSCULAR; INTRAVENOUS EVERY 6 HOURS
Status: COMPLETED | OUTPATIENT
Start: 2025-01-02 | End: 2025-01-05

## 2025-01-02 RX ORDER — INSULIN LISPRO 100 [IU]/ML
0-8 INJECTION, SOLUTION INTRAVENOUS; SUBCUTANEOUS
Status: DISCONTINUED | OUTPATIENT
Start: 2025-01-02 | End: 2025-01-14 | Stop reason: HOSPADM

## 2025-01-02 RX ORDER — MONTELUKAST SODIUM 10 MG/1
10 TABLET ORAL NIGHTLY
Status: DISCONTINUED | OUTPATIENT
Start: 2025-01-02 | End: 2025-01-14 | Stop reason: HOSPADM

## 2025-01-02 RX ORDER — LEVOTHYROXINE SODIUM 88 UG/1
88 TABLET ORAL DAILY
Status: DISCONTINUED | OUTPATIENT
Start: 2025-01-02 | End: 2025-01-14 | Stop reason: HOSPADM

## 2025-01-02 RX ORDER — ONDANSETRON 2 MG/ML
INJECTION INTRAMUSCULAR; INTRAVENOUS
Status: DISCONTINUED | OUTPATIENT
Start: 2025-01-02 | End: 2025-01-02 | Stop reason: SDUPTHER

## 2025-01-02 RX ORDER — FAMOTIDINE 20 MG/1
20 TABLET, FILM COATED ORAL 2 TIMES DAILY
Status: DISCONTINUED | OUTPATIENT
Start: 2025-01-02 | End: 2025-01-14 | Stop reason: HOSPADM

## 2025-01-02 RX ORDER — PROCHLORPERAZINE EDISYLATE 5 MG/ML
5 INJECTION INTRAMUSCULAR; INTRAVENOUS
Status: DISCONTINUED | OUTPATIENT
Start: 2025-01-02 | End: 2025-01-02 | Stop reason: HOSPADM

## 2025-01-02 RX ORDER — MORPHINE SULFATE 4 MG/ML
4 INJECTION, SOLUTION INTRAMUSCULAR; INTRAVENOUS
Status: DISCONTINUED | OUTPATIENT
Start: 2025-01-02 | End: 2025-01-04

## 2025-01-02 RX ORDER — LIDOCAINE HYDROCHLORIDE 20 MG/ML
INJECTION, SOLUTION EPIDURAL; INFILTRATION; INTRACAUDAL; PERINEURAL
Status: DISCONTINUED | OUTPATIENT
Start: 2025-01-02 | End: 2025-01-02 | Stop reason: SDUPTHER

## 2025-01-02 RX ADMIN — SODIUM CHLORIDE: 9 INJECTION, SOLUTION INTRAVENOUS at 07:52

## 2025-01-02 RX ADMIN — BISACODYL 5 MG: 5 TABLET, COATED ORAL at 14:37

## 2025-01-02 RX ADMIN — HALOPERIDOL LACTATE 1 MG: 5 INJECTION, SOLUTION INTRAMUSCULAR at 11:53

## 2025-01-02 RX ADMIN — KETOROLAC TROMETHAMINE 30 MG: 30 INJECTION, SOLUTION INTRAMUSCULAR at 20:54

## 2025-01-02 RX ADMIN — MEPERIDINE HYDROCHLORIDE 12.5 MG: 25 INJECTION INTRAMUSCULAR; INTRAVENOUS; SUBCUTANEOUS at 11:41

## 2025-01-02 RX ADMIN — METHOCARBAMOL 1000 MG: 100 INJECTION, SOLUTION INTRAMUSCULAR; INTRAVENOUS at 20:54

## 2025-01-02 RX ADMIN — WATER 1000 MG: 1 INJECTION INTRAMUSCULAR; INTRAVENOUS; SUBCUTANEOUS at 08:41

## 2025-01-02 RX ADMIN — ROCURONIUM BROMIDE 20 MG: 10 INJECTION, SOLUTION INTRAVENOUS at 09:20

## 2025-01-02 RX ADMIN — PROPOFOL 180 MG: 10 INJECTION, EMULSION INTRAVENOUS at 08:45

## 2025-01-02 RX ADMIN — LIDOCAINE HYDROCHLORIDE 60 MG: 20 INJECTION, SOLUTION EPIDURAL; INFILTRATION; INTRACAUDAL; PERINEURAL at 08:45

## 2025-01-02 RX ADMIN — DULOXETINE HYDROCHLORIDE 60 MG: 60 CAPSULE, DELAYED RELEASE ORAL at 20:54

## 2025-01-02 RX ADMIN — FAMOTIDINE 20 MG: 20 TABLET, FILM COATED ORAL at 20:54

## 2025-01-02 RX ADMIN — ROCURONIUM BROMIDE 10 MG: 10 INJECTION, SOLUTION INTRAVENOUS at 10:00

## 2025-01-02 RX ADMIN — PIPERACILLIN AND TAZOBACTAM 3375 MG: 3; .375 INJECTION, POWDER, LYOPHILIZED, FOR SOLUTION INTRAVENOUS at 14:42

## 2025-01-02 RX ADMIN — INDOCYANINE GREEN AND WATER 10 MG: KIT at 10:00

## 2025-01-02 RX ADMIN — FENTANYL CITRATE 100 MCG: 50 INJECTION, SOLUTION INTRAMUSCULAR; INTRAVENOUS at 08:45

## 2025-01-02 RX ADMIN — ACETAMINOPHEN 650 MG: 325 TABLET ORAL at 14:36

## 2025-01-02 RX ADMIN — FENTANYL CITRATE 50 MCG: 50 INJECTION, SOLUTION INTRAMUSCULAR; INTRAVENOUS at 09:40

## 2025-01-02 RX ADMIN — Medication 3 MG: at 11:14

## 2025-01-02 RX ADMIN — FENTANYL CITRATE 50 MCG: 50 INJECTION, SOLUTION INTRAMUSCULAR; INTRAVENOUS at 11:10

## 2025-01-02 RX ADMIN — MIDAZOLAM 2 MG: 1 INJECTION INTRAMUSCULAR; INTRAVENOUS at 08:41

## 2025-01-02 RX ADMIN — ROCURONIUM BROMIDE 40 MG: 10 INJECTION, SOLUTION INTRAVENOUS at 08:45

## 2025-01-02 RX ADMIN — MEPERIDINE HYDROCHLORIDE 12.5 MG: 25 INJECTION INTRAMUSCULAR; INTRAVENOUS; SUBCUTANEOUS at 11:50

## 2025-01-02 RX ADMIN — SODIUM CHLORIDE: 9 INJECTION, SOLUTION INTRAVENOUS at 09:32

## 2025-01-02 RX ADMIN — Medication 0.6 MG: at 11:14

## 2025-01-02 RX ADMIN — MONTELUKAST 10 MG: 10 TABLET, FILM COATED ORAL at 20:54

## 2025-01-02 RX ADMIN — FENTANYL CITRATE 50 MCG: 50 INJECTION, SOLUTION INTRAMUSCULAR; INTRAVENOUS at 10:46

## 2025-01-02 RX ADMIN — SODIUM CHLORIDE: 9 INJECTION, SOLUTION INTRAVENOUS at 11:18

## 2025-01-02 RX ADMIN — DEXAMETHASONE SODIUM PHOSPHATE 10 MG: 10 INJECTION, SOLUTION INTRAMUSCULAR; INTRAVENOUS at 08:54

## 2025-01-02 RX ADMIN — HYDROMORPHONE HYDROCHLORIDE 0.5 MG: 1 INJECTION, SOLUTION INTRAMUSCULAR; INTRAVENOUS; SUBCUTANEOUS at 11:40

## 2025-01-02 RX ADMIN — ONDANSETRON 4 MG: 2 INJECTION INTRAMUSCULAR; INTRAVENOUS at 11:00

## 2025-01-02 RX ADMIN — FAMOTIDINE 20 MG: 20 TABLET, FILM COATED ORAL at 14:37

## 2025-01-02 RX ADMIN — INSULIN LISPRO 2 UNITS: 100 INJECTION, SOLUTION INTRAVENOUS; SUBCUTANEOUS at 20:57

## 2025-01-02 RX ADMIN — SODIUM CHLORIDE, POTASSIUM CHLORIDE, SODIUM LACTATE AND CALCIUM CHLORIDE: 600; 310; 30; 20 INJECTION, SOLUTION INTRAVENOUS at 14:53

## 2025-01-02 RX ADMIN — OXYCODONE 10 MG: 5 TABLET ORAL at 16:14

## 2025-01-02 RX ADMIN — HYDROMORPHONE HYDROCHLORIDE 0.5 MG: 1 INJECTION, SOLUTION INTRAMUSCULAR; INTRAVENOUS; SUBCUTANEOUS at 11:33

## 2025-01-02 RX ADMIN — LEVOTHYROXINE SODIUM 88 MCG: 0.09 TABLET ORAL at 16:59

## 2025-01-02 RX ADMIN — METHOCARBAMOL 1000 MG: 100 INJECTION, SOLUTION INTRAMUSCULAR; INTRAVENOUS at 12:02

## 2025-01-02 RX ADMIN — INSULIN LISPRO 2 UNITS: 100 INJECTION, SOLUTION INTRAVENOUS; SUBCUTANEOUS at 16:59

## 2025-01-02 RX ADMIN — KETOROLAC TROMETHAMINE 30 MG: 30 INJECTION, SOLUTION INTRAMUSCULAR at 14:37

## 2025-01-02 RX ADMIN — MORPHINE SULFATE 4 MG: 4 INJECTION, SOLUTION INTRAMUSCULAR; INTRAVENOUS at 18:14

## 2025-01-02 RX ADMIN — PIPERACILLIN AND TAZOBACTAM 3375 MG: 3; .375 INJECTION, POWDER, LYOPHILIZED, FOR SOLUTION INTRAVENOUS at 21:52

## 2025-01-02 RX ADMIN — ACETAMINOPHEN 650 MG: 325 TABLET ORAL at 20:54

## 2025-01-02 ASSESSMENT — PAIN SCALES - GENERAL
PAINLEVEL_OUTOF10: 8
PAINLEVEL_OUTOF10: 5
PAINLEVEL_OUTOF10: 4
PAINLEVEL_OUTOF10: 3
PAINLEVEL_OUTOF10: 8
PAINLEVEL_OUTOF10: 9
PAINLEVEL_OUTOF10: 8

## 2025-01-02 ASSESSMENT — PAIN DESCRIPTION - LOCATION
LOCATION: ABDOMEN

## 2025-01-02 ASSESSMENT — PAIN DESCRIPTION - DESCRIPTORS
DESCRIPTORS: ACHING;THROBBING
DESCRIPTORS: ACHING;SORE;TENDER;THROBBING
DESCRIPTORS: ACHING;THROBBING
DESCRIPTORS: ACHING;SORE;TENDER
DESCRIPTORS: ACHING;THROBBING
DESCRIPTORS: ACHING;SORE;TENDER

## 2025-01-02 ASSESSMENT — PAIN - FUNCTIONAL ASSESSMENT: PAIN_FUNCTIONAL_ASSESSMENT: NONE - DENIES PAIN

## 2025-01-02 ASSESSMENT — PAIN DESCRIPTION - ORIENTATION
ORIENTATION: MID
ORIENTATION: MID;UPPER

## 2025-01-02 ASSESSMENT — LIFESTYLE VARIABLES
SMOKING_STATUS: 1
HOW OFTEN DO YOU HAVE A DRINK CONTAINING ALCOHOL: NEVER
HOW MANY STANDARD DRINKS CONTAINING ALCOHOL DO YOU HAVE ON A TYPICAL DAY: PATIENT DOES NOT DRINK

## 2025-01-02 NOTE — H&P
PRAPARE - Transportation     Lack of Transportation (Medical): No     Lack of Transportation (Non-Medical): No   Physical Activity: Not on file   Stress: Not on file   Social Connections: Not on file   Intimate Partner Violence: Not on file   Housing Stability: Low Risk  (9/1/2024)    Housing Stability Vital Sign     Unable to Pay for Housing in the Last Year: No     Number of Times Moved in the Last Year: 1     Homeless in the Last Year: No       I have reviewed relevant labs from this admission and interpretation is included in my assessment and plan    Review of Systems    A complete 10 system review was performed and are otherwise negative unless mentioned in the above HPI. Specific negatives are listed below but may not include all those reviewed.    General ROS: negative obtundation, AMS  ENT ROS: negative rhinorrhea, epistaxis  Allergy and Immunology ROS: negative itchy/watery eyes or nasal congestion  Hematological and Lymphatic ROS: negative spontaneous bleeding or bruising  Endocrine ROS: negative  lethargy, mood swings, palpitations or polydipsia/polyuria  Respiratory ROS: negative sputum changes, stridor, tachypnea or wheezing  Cardiovascular ROS: negative for - loss of consciousness, murmur or orthopnea  Gastrointestinal ROS: negative for - hematochezia or hematemesis  Genito-Urinary ROS: negative for -  genital discharge or hematuria  Musculoskeletal ROS: negative for - focal weakness, gangrene  Psych/Neuro ROS: negative for - visual or auditory hallucinations, suicidal ideation    Physical exam:   There were no vitals taken for this visit.  General appearance:  NAD, appears stated age  Head: NCAT, PERRLA, EOMI, red conjunctiva  Neck: supple, no masses, trachea midline  Lungs: Equal chest rise bilateral, no retractions, no wheezing  Heart: Reg rate  Abdomen: soft, nontender, obese, nondistended  Skin; warm and dry, no cyanosis, right buttock with open wound she has got significant skin irritation from

## 2025-01-02 NOTE — DISCHARGE INSTRUCTIONS
Your information:  Name: Velma Mazariegos  : 1965    Your instructions:  Discharge to [facility]    Colostomy supplies  Coloplast one piece system #[id number]  Coloplast ring  Coloplast barrier strips  Supplies given for discharge    APPLY INTERDRY TO ABD FOLDS INNER GROINS   1. Wash skin gently. Pat dry, do not rub.   2. Cut the appropriate size of fabric with clean scissors, allowing a minimum of 2 inches of the fabric exposed outside the skin fold for moisture evaporation.   3. Lay a single layer of fabric in the in the skin fold, placing one edge of the fabric into the base of the fold. Gently smooth the rest of the fabric over the skin, keeping it flat.   4. Secure the fabric in one of several ways: with skin fold, with a small amount of tape, or tucked under clothing.   5. Each piece of Interdry may be used up to 5 days(date with a pen). Replace fabric when soiled with blood, urine, stool or after 5 days. Remove fabric for bathing and reuse when finished.   6. When removing fabric from skin folds, gently separate the skin fold and lift away fabric     Wound care-   Change packing daily with plain moist packing to midline, clean around drain prn and daily and replace dressing     Signs and symptoms to watch out for:  Call 911 anytime you think you may need emergency care. For example, call if:    You passed out (lost consciousness).     You are short of breath.   Call your doctor now or seek immediate medical care if:    You are sick to your stomach and cannot drink fluids or keep them down.     You have signs of a blood clot in your leg (called a deep vein thrombosis), such as:  Pain in your calf, back of the knee, thigh, or groin.  Redness and swelling in your leg or groin.     You have signs of infection, such as:  Increased pain, swelling, warmth, or redness.  Red streaks leading from the incision.  Pus draining from the incision.  A fever.     You have pain that does not get better after you take pain

## 2025-01-02 NOTE — BRIEF OP NOTE
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]    Post-Op Diagnosis: pelvic abscess       Procedure(s): Laparoscopic robotic assisted low anterior resection with complete mobilization of splenic flexure, open drainage of pelvic abscess      Surgeon(s):  Juan David Carranza MD    Assistant:  First Assistant: Roxanne Leung    Anesthesia: General    Estimated Blood Loss (mL): Minimal    Complications: None    Specimens:   ID Type Source Tests Collected by Time Destination   A : sigmoid colon and donuts Tissue Colon SURGICAL PATHOLOGY Juan David Carranza MD 1/2/2025 1101        Implants:  * No implants in log *      Drains: * No LDAs found *    Findings:  Infection Present At Time Of Surgery (PATOS) (choose all levels that have infection present):  - Organ Space infection (below fascia) present as evidenced by pus, purulent fluid, and fluid consistent with infection  Other Findings:       Electronically signed by Juan David Carranza MD on 1/2/2025 at 11:11 AM

## 2025-01-02 NOTE — ANESTHESIA POSTPROCEDURE EVALUATION
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    Niecy Phase I: Niecy Score: 9    Niecy Phase II:      Anesthesia Post Evaluation    Patient location during evaluation: PACU  Patient participation: complete - patient participated  Level of consciousness: awake  Airway patency: patent  Nausea & Vomiting: no nausea and no vomiting  Cardiovascular status: hemodynamically stable  Respiratory status: acceptable  Hydration status: euvolemic  Pain management: adequate        No notable events documented.

## 2025-01-02 NOTE — ANESTHESIA PRE PROCEDURE
Department of Anesthesiology  Preprocedure Note       Name:  Velma Mazariegos   Age:  59 y.o.  :  1965                                          MRN:  [id number]         Date:  2025      Surgeon: Surgeon(s):  Juan David Carranza MD    Procedure: Procedure(s):  BOWEL RESECTION SIGMOID LAPAROSCOPIC ROBOTIC XI    Medications prior to admission:   Prior to Admission medications    Medication Sig Start Date End Date Taking? Authorizing Provider   fluticasone-salmeterol (ADVAIR DISKUS) 250-50 MCG/ACT AEPB diskus inhaler Inhale 1 puff into the lungs in the morning and 1 puff in the evening.   Yes Chong Ford MD   linagliptin (TRADJENTA) 5 MG tablet Take 1 tablet by mouth daily   Yes Chong Ford MD   duloxetine (CYMBALTA) 60 MG capsule Take 1 capsule by mouth nightly   Yes Chong Ford MD   montelukast (SINGULAIR) 10 MG tablet Take 1 tablet by mouth nightly   Yes Chong Ford MD   DUPIXENT 300 MG/2ML SOPN injection  24   Chong Ford MD   Omega 3 1000 MG CAPS Take 1 capsule by mouth 2 times daily    Chong Ford MD   LORazepam (ATIVAN) 1 MG tablet Take 1 tablet by mouth nightly as needed (sleep).    Chong Ford MD   clindamycin (CLEOCIN T) 1 % lotion Apply topically 2 times daily Apply to groin area.    Chong Ford MD   fluocinonide (LIDEX) 0.05 % cream Apply topically 2 times daily as needed (eczema) Apply to buttocks.    Chong Ford MD   empagliflozin (JARDIANCE) 25 MG tablet Take 1 tablet by mouth daily    Chong Ford MD   albuterol sulfate HFA (PROVENTIL HFA) 108 (90 Base) MCG/ACT inhaler Inhale 2 puffs into the lungs 4 times daily as needed for Wheezing or Shortness of Breath 21   Jatinder Rivera PA-C   levothyroxine (SYNTHROID) 88 MCG tablet Take 1 tablet by mouth Daily    Chong Ford MD       Current medications:    Current Facility-Administered Medications   Medication Dose Route Frequency

## 2025-01-02 NOTE — OP NOTE
fire of the 60mm green load of the robotic stapler until I transected at this point just below the peritoneal reflection.  At this point, we dissected up the white line of Toldt all the way up to the splenic flexure using the vessel sealing device.    I continued with complete mobilization of the splenic flexure to the mid to distal transverse colon. The omentum was freed from the transverse colon to allow enough length to reach the pelvis at the site of the proposed proximal anastomotic end.  Reposition of the patient had a position was required to complete mobilization of the splenic flexure to allow enough length to enter down into the pelvis.  Due to the patient's body habitus it is consist of considerable time requirements meticulous dissection was undertaken and safely mobilized the entire splenic flexure.    Again, I identified that area proximal where I planned my transection point.  We then upsized the surpaumbilical camera trocar site with cautery and [employer]/navy retractors and inserted a wound protector device.  At this point, I inserted a 29 EEA anvil with a 2-0 Ethibond stitch attached to the tip of the harpooon.  We then redocked the robot, performed a colotomy just distal to the side of my planned transection, inserted the anvil and extruded through the antimesenteric border using the sharp harpoon on the anvil.  I then pursestringed it in place with a 2-0 Prolene stitch.  The Prolene stitch was then used to close the enterotomy that had been made distally in the specimen.  I then fired a single loads of the green 45 cm robotic stapler in order to transect just proximal to the colotomy site and distal to the anvil.  At this point, I had completely transected our specimen, it was placed into the right side of the pelvis.      At this point, I had plenty of length and the anvil laid easily over the pelvic brim down right over the top of the rectum.  We then performed serial dilation from below using the

## 2025-01-03 LAB
25(OH)D3 SERPL-MCNC: 13.1 NG/ML (ref 30–100)
ALBUMIN SERPL-MCNC: 3.4 G/DL (ref 3.5–5.2)
ALP SERPL-CCNC: 78 U/L (ref 35–104)
ALT SERPL-CCNC: 42 U/L (ref 0–32)
ANION GAP SERPL CALCULATED.3IONS-SCNC: 10 MMOL/L (ref 7–16)
AST SERPL-CCNC: 16 U/L (ref 0–31)
BASOPHILS # BLD: 0.02 K/UL (ref 0–0.2)
BASOPHILS NFR BLD: 0 % (ref 0–2)
BILIRUB SERPL-MCNC: 0.3 MG/DL (ref 0–1.2)
BUN SERPL-MCNC: 9 MG/DL (ref 6–23)
CALCIUM SERPL-MCNC: 8.7 MG/DL (ref 8.6–10.2)
CHLORIDE SERPL-SCNC: 105 MMOL/L (ref 98–107)
CHOLEST SERPL-MCNC: 172 MG/DL
CO2 SERPL-SCNC: 24 MMOL/L (ref 22–29)
CREAT SERPL-MCNC: 0.9 MG/DL (ref 0.5–1)
EKG ATRIAL RATE: 96 BPM
EKG P AXIS: 68 DEGREES
EKG P-R INTERVAL: 184 MS
EKG Q-T INTERVAL: 376 MS
EKG QRS DURATION: 94 MS
EKG QTC CALCULATION (BAZETT): 475 MS
EKG R AXIS: -9 DEGREES
EKG T AXIS: 55 DEGREES
EKG VENTRICULAR RATE: 96 BPM
EOSINOPHIL # BLD: 0 K/UL (ref 0.05–0.5)
EOSINOPHILS RELATIVE PERCENT: 0 % (ref 0–6)
ERYTHROCYTE [DISTWIDTH] IN BLOOD BY AUTOMATED COUNT: 17.1 % (ref 11.5–15)
FOLATE SERPL-MCNC: 8.7 NG/ML (ref 4.8–24.2)
GFR, ESTIMATED: 76 ML/MIN/1.73M2
GLUCOSE BLD-MCNC: 147 MG/DL (ref 74–99)
GLUCOSE BLD-MCNC: 173 MG/DL (ref 74–99)
GLUCOSE BLD-MCNC: 176 MG/DL (ref 74–99)
GLUCOSE BLD-MCNC: 196 MG/DL (ref 74–99)
GLUCOSE SERPL-MCNC: 203 MG/DL (ref 74–99)
HBA1C MFR BLD: 8.6 % (ref 4–5.6)
HCT VFR BLD AUTO: 35.2 % (ref 34–48)
HDLC SERPL-MCNC: 38 MG/DL
HGB BLD-MCNC: 11.2 G/DL (ref 11.5–15.5)
IMM GRANULOCYTES # BLD AUTO: 0.04 K/UL (ref 0–0.58)
IMM GRANULOCYTES NFR BLD: 0 % (ref 0–5)
IRON SATN MFR SERPL: 10 % (ref 15–50)
IRON SERPL-MCNC: 24 UG/DL (ref 37–145)
LDLC SERPL CALC-MCNC: 123 MG/DL
LYMPHOCYTES NFR BLD: 0.77 K/UL (ref 1.5–4)
LYMPHOCYTES RELATIVE PERCENT: 8 % (ref 20–42)
MCH RBC QN AUTO: 28.6 PG (ref 26–35)
MCHC RBC AUTO-ENTMCNC: 31.8 G/DL (ref 32–34.5)
MCV RBC AUTO: 90 FL (ref 80–99.9)
MONOCYTES NFR BLD: 0.55 K/UL (ref 0.1–0.95)
MONOCYTES NFR BLD: 6 % (ref 2–12)
NEUTROPHILS NFR BLD: 86 % (ref 43–80)
NEUTS SEG NFR BLD: 8.36 K/UL (ref 1.8–7.3)
PLATELET # BLD AUTO: 367 K/UL (ref 130–450)
PMV BLD AUTO: 8.6 FL (ref 7–12)
POTASSIUM SERPL-SCNC: 4.5 MMOL/L (ref 3.5–5)
PROT SERPL-MCNC: 5.9 G/DL (ref 6.4–8.3)
RBC # BLD AUTO: 3.91 M/UL (ref 3.5–5.5)
SODIUM SERPL-SCNC: 139 MMOL/L (ref 132–146)
T4 FREE SERPL-MCNC: 1.2 NG/DL (ref 0.9–1.7)
TIBC SERPL-MCNC: 243 UG/DL (ref 250–450)
TRIGL SERPL-MCNC: 54 MG/DL
TROPONIN I SERPL HS-MCNC: 12 NG/L (ref 0–9)
TSH SERPL DL<=0.05 MIU/L-ACNC: 4.58 UIU/ML (ref 0.27–4.2)
VIT B12 SERPL-MCNC: 1307 PG/ML (ref 211–946)
VLDLC SERPL CALC-MCNC: 11 MG/DL
WBC OTHER # BLD: 9.7 K/UL (ref 4.5–11.5)

## 2025-01-03 PROCEDURE — 2700000000 HC OXYGEN THERAPY PER DAY

## 2025-01-03 PROCEDURE — 82746 ASSAY OF FOLIC ACID SERUM: CPT

## 2025-01-03 PROCEDURE — 84484 ASSAY OF TROPONIN QUANT: CPT

## 2025-01-03 PROCEDURE — 93005 ELECTROCARDIOGRAM TRACING: CPT

## 2025-01-03 PROCEDURE — 6360000002 HC RX W HCPCS: Performed by: SURGERY

## 2025-01-03 PROCEDURE — 83540 ASSAY OF IRON: CPT

## 2025-01-03 PROCEDURE — 84439 ASSAY OF FREE THYROXINE: CPT

## 2025-01-03 PROCEDURE — 82306 VITAMIN D 25 HYDROXY: CPT

## 2025-01-03 PROCEDURE — 82962 GLUCOSE BLOOD TEST: CPT

## 2025-01-03 PROCEDURE — 84443 ASSAY THYROID STIM HORMONE: CPT

## 2025-01-03 PROCEDURE — 1200000000 HC SEMI PRIVATE

## 2025-01-03 PROCEDURE — 2500000003 HC RX 250 WO HCPCS: Performed by: SURGERY

## 2025-01-03 PROCEDURE — 99232 SBSQ HOSP IP/OBS MODERATE 35: CPT | Performed by: STUDENT IN AN ORGANIZED HEALTH CARE EDUCATION/TRAINING PROGRAM

## 2025-01-03 PROCEDURE — 6370000000 HC RX 637 (ALT 250 FOR IP)

## 2025-01-03 PROCEDURE — 83036 HEMOGLOBIN GLYCOSYLATED A1C: CPT

## 2025-01-03 PROCEDURE — 2580000003 HC RX 258: Performed by: SURGERY

## 2025-01-03 PROCEDURE — 83550 IRON BINDING TEST: CPT

## 2025-01-03 PROCEDURE — 82607 VITAMIN B-12: CPT

## 2025-01-03 PROCEDURE — 80053 COMPREHEN METABOLIC PANEL: CPT

## 2025-01-03 PROCEDURE — 6370000000 HC RX 637 (ALT 250 FOR IP): Performed by: SURGERY

## 2025-01-03 PROCEDURE — 80061 LIPID PANEL: CPT

## 2025-01-03 PROCEDURE — 85025 COMPLETE CBC W/AUTO DIFF WBC: CPT

## 2025-01-03 PROCEDURE — 2580000003 HC RX 258: Performed by: STUDENT IN AN ORGANIZED HEALTH CARE EDUCATION/TRAINING PROGRAM

## 2025-01-03 PROCEDURE — 36415 COLL VENOUS BLD VENIPUNCTURE: CPT

## 2025-01-03 RX ORDER — SODIUM CHLORIDE, SODIUM LACTATE, POTASSIUM CHLORIDE, CALCIUM CHLORIDE 600; 310; 30; 20 MG/100ML; MG/100ML; MG/100ML; MG/100ML
INJECTION, SOLUTION INTRAVENOUS CONTINUOUS
Status: DISCONTINUED | OUTPATIENT
Start: 2025-01-03 | End: 2025-01-04

## 2025-01-03 RX ADMIN — DULOXETINE HYDROCHLORIDE 60 MG: 60 CAPSULE, DELAYED RELEASE ORAL at 19:52

## 2025-01-03 RX ADMIN — ACETAMINOPHEN 650 MG: 325 TABLET ORAL at 19:52

## 2025-01-03 RX ADMIN — KETOROLAC TROMETHAMINE 30 MG: 30 INJECTION, SOLUTION INTRAMUSCULAR at 08:26

## 2025-01-03 RX ADMIN — FAMOTIDINE 20 MG: 20 TABLET, FILM COATED ORAL at 08:26

## 2025-01-03 RX ADMIN — INSULIN LISPRO 2 UNITS: 100 INJECTION, SOLUTION INTRAVENOUS; SUBCUTANEOUS at 08:27

## 2025-01-03 RX ADMIN — OXYCODONE 10 MG: 5 TABLET ORAL at 08:34

## 2025-01-03 RX ADMIN — KETOROLAC TROMETHAMINE 30 MG: 30 INJECTION, SOLUTION INTRAMUSCULAR at 14:20

## 2025-01-03 RX ADMIN — KETOROLAC TROMETHAMINE 30 MG: 30 INJECTION, SOLUTION INTRAMUSCULAR at 19:52

## 2025-01-03 RX ADMIN — ACETAMINOPHEN 650 MG: 325 TABLET ORAL at 08:26

## 2025-01-03 RX ADMIN — ACETAMINOPHEN 650 MG: 325 TABLET ORAL at 01:54

## 2025-01-03 RX ADMIN — ACETAMINOPHEN 650 MG: 325 TABLET ORAL at 14:22

## 2025-01-03 RX ADMIN — METHOCARBAMOL 1000 MG: 100 INJECTION, SOLUTION INTRAMUSCULAR; INTRAVENOUS at 19:53

## 2025-01-03 RX ADMIN — ENOXAPARIN SODIUM 40 MG: 100 INJECTION SUBCUTANEOUS at 08:26

## 2025-01-03 RX ADMIN — LEVOTHYROXINE SODIUM 88 MCG: 0.09 TABLET ORAL at 05:24

## 2025-01-03 RX ADMIN — SODIUM CHLORIDE, POTASSIUM CHLORIDE, SODIUM LACTATE AND CALCIUM CHLORIDE: 600; 310; 30; 20 INJECTION, SOLUTION INTRAVENOUS at 06:54

## 2025-01-03 RX ADMIN — PIPERACILLIN AND TAZOBACTAM 3375 MG: 3; .375 INJECTION, POWDER, LYOPHILIZED, FOR SOLUTION INTRAVENOUS at 22:38

## 2025-01-03 RX ADMIN — SODIUM CHLORIDE, PRESERVATIVE FREE 10 ML: 5 INJECTION INTRAVENOUS at 20:09

## 2025-01-03 RX ADMIN — KETOROLAC TROMETHAMINE 30 MG: 30 INJECTION, SOLUTION INTRAMUSCULAR at 01:54

## 2025-01-03 RX ADMIN — MONTELUKAST 10 MG: 10 TABLET, FILM COATED ORAL at 19:52

## 2025-01-03 RX ADMIN — METHOCARBAMOL 1000 MG: 100 INJECTION, SOLUTION INTRAMUSCULAR; INTRAVENOUS at 05:25

## 2025-01-03 RX ADMIN — PIPERACILLIN AND TAZOBACTAM 3375 MG: 3; .375 INJECTION, POWDER, LYOPHILIZED, FOR SOLUTION INTRAVENOUS at 05:26

## 2025-01-03 RX ADMIN — PIPERACILLIN AND TAZOBACTAM 3375 MG: 3; .375 INJECTION, POWDER, LYOPHILIZED, FOR SOLUTION INTRAVENOUS at 14:20

## 2025-01-03 RX ADMIN — METHOCARBAMOL 1000 MG: 100 INJECTION, SOLUTION INTRAMUSCULAR; INTRAVENOUS at 11:10

## 2025-01-03 RX ADMIN — OXYCODONE 10 MG: 5 TABLET ORAL at 18:49

## 2025-01-03 RX ADMIN — OXYCODONE 10 MG: 5 TABLET ORAL at 12:41

## 2025-01-03 RX ADMIN — FAMOTIDINE 20 MG: 20 TABLET, FILM COATED ORAL at 19:52

## 2025-01-03 RX ADMIN — BISACODYL 5 MG: 5 TABLET, COATED ORAL at 08:26

## 2025-01-03 ASSESSMENT — PAIN SCALES - GENERAL
PAINLEVEL_OUTOF10: 2
PAINLEVEL_OUTOF10: 7
PAINLEVEL_OUTOF10: 0
PAINLEVEL_OUTOF10: 7
PAINLEVEL_OUTOF10: 4

## 2025-01-03 ASSESSMENT — PAIN DESCRIPTION - LOCATION: LOCATION: ABDOMEN

## 2025-01-04 ENCOUNTER — APPOINTMENT (OUTPATIENT)
Dept: CT IMAGING | Age: 60
DRG: 231 | End: 2025-01-04
Attending: SURGERY
Payer: COMMERCIAL

## 2025-01-04 LAB
ALBUMIN SERPL-MCNC: 3.2 G/DL (ref 3.5–5.2)
ALP SERPL-CCNC: 69 U/L (ref 35–104)
ALT SERPL-CCNC: 28 U/L (ref 0–32)
ANION GAP SERPL CALCULATED.3IONS-SCNC: 11 MMOL/L (ref 7–16)
AST SERPL-CCNC: 17 U/L (ref 0–31)
BASOPHILS # BLD: 0.03 K/UL (ref 0–0.2)
BASOPHILS NFR BLD: 0 % (ref 0–2)
BILIRUB SERPL-MCNC: 0.2 MG/DL (ref 0–1.2)
BUN SERPL-MCNC: 19 MG/DL (ref 6–23)
CALCIUM SERPL-MCNC: 9.1 MG/DL (ref 8.6–10.2)
CHLORIDE SERPL-SCNC: 105 MMOL/L (ref 98–107)
CO2 SERPL-SCNC: 21 MMOL/L (ref 22–29)
CREAT SERPL-MCNC: 0.9 MG/DL (ref 0.5–1)
EOSINOPHIL # BLD: 0.15 K/UL (ref 0.05–0.5)
EOSINOPHILS RELATIVE PERCENT: 2 % (ref 0–6)
ERYTHROCYTE [DISTWIDTH] IN BLOOD BY AUTOMATED COUNT: 17.1 % (ref 11.5–15)
GFR, ESTIMATED: 70 ML/MIN/1.73M2
GLUCOSE BLD-MCNC: 137 MG/DL (ref 74–99)
GLUCOSE BLD-MCNC: 156 MG/DL (ref 74–99)
GLUCOSE BLD-MCNC: 223 MG/DL (ref 74–99)
GLUCOSE BLD-MCNC: 232 MG/DL (ref 74–99)
GLUCOSE SERPL-MCNC: 136 MG/DL (ref 74–99)
HCT VFR BLD AUTO: 33.2 % (ref 34–48)
HGB BLD-MCNC: 10.8 G/DL (ref 11.5–15.5)
IMM GRANULOCYTES # BLD AUTO: 0.08 K/UL (ref 0–0.58)
IMM GRANULOCYTES NFR BLD: 1 % (ref 0–5)
LYMPHOCYTES NFR BLD: 1.98 K/UL (ref 1.5–4)
LYMPHOCYTES RELATIVE PERCENT: 22 % (ref 20–42)
MCH RBC QN AUTO: 29.1 PG (ref 26–35)
MCHC RBC AUTO-ENTMCNC: 32.5 G/DL (ref 32–34.5)
MCV RBC AUTO: 89.5 FL (ref 80–99.9)
MONOCYTES NFR BLD: 0.54 K/UL (ref 0.1–0.95)
MONOCYTES NFR BLD: 6 % (ref 2–12)
NEUTROPHILS NFR BLD: 69 % (ref 43–80)
NEUTS SEG NFR BLD: 6.23 K/UL (ref 1.8–7.3)
PLATELET CONFIRMATION: NORMAL
PLATELET, FLUORESCENCE: 352 K/UL (ref 130–450)
PMV BLD AUTO: 8.9 FL (ref 7–12)
POTASSIUM SERPL-SCNC: 4.7 MMOL/L (ref 3.5–5)
PROT SERPL-MCNC: 5.7 G/DL (ref 6.4–8.3)
RBC # BLD AUTO: 3.71 M/UL (ref 3.5–5.5)
SODIUM SERPL-SCNC: 137 MMOL/L (ref 132–146)
WBC OTHER # BLD: 9 K/UL (ref 4.5–11.5)

## 2025-01-04 PROCEDURE — 2580000003 HC RX 258: Performed by: SURGERY

## 2025-01-04 PROCEDURE — 6370000000 HC RX 637 (ALT 250 FOR IP): Performed by: STUDENT IN AN ORGANIZED HEALTH CARE EDUCATION/TRAINING PROGRAM

## 2025-01-04 PROCEDURE — 1200000000 HC SEMI PRIVATE

## 2025-01-04 PROCEDURE — 2700000000 HC OXYGEN THERAPY PER DAY

## 2025-01-04 PROCEDURE — 6360000004 HC RX CONTRAST MEDICATION: Performed by: RADIOLOGY

## 2025-01-04 PROCEDURE — 2500000003 HC RX 250 WO HCPCS: Performed by: SURGERY

## 2025-01-04 PROCEDURE — 74177 CT ABD & PELVIS W/CONTRAST: CPT

## 2025-01-04 PROCEDURE — 85025 COMPLETE CBC W/AUTO DIFF WBC: CPT

## 2025-01-04 PROCEDURE — 6370000000 HC RX 637 (ALT 250 FOR IP): Performed by: SURGERY

## 2025-01-04 PROCEDURE — 94660 CPAP INITIATION&MGMT: CPT

## 2025-01-04 PROCEDURE — 6360000002 HC RX W HCPCS: Performed by: STUDENT IN AN ORGANIZED HEALTH CARE EDUCATION/TRAINING PROGRAM

## 2025-01-04 PROCEDURE — 6370000000 HC RX 637 (ALT 250 FOR IP)

## 2025-01-04 PROCEDURE — 6360000002 HC RX W HCPCS: Performed by: INTERNAL MEDICINE

## 2025-01-04 PROCEDURE — 36415 COLL VENOUS BLD VENIPUNCTURE: CPT

## 2025-01-04 PROCEDURE — 6360000002 HC RX W HCPCS: Performed by: SURGERY

## 2025-01-04 PROCEDURE — 82962 GLUCOSE BLOOD TEST: CPT

## 2025-01-04 PROCEDURE — 80053 COMPREHEN METABOLIC PANEL: CPT

## 2025-01-04 PROCEDURE — 2580000003 HC RX 258: Performed by: STUDENT IN AN ORGANIZED HEALTH CARE EDUCATION/TRAINING PROGRAM

## 2025-01-04 RX ORDER — IOPAMIDOL 755 MG/ML
75 INJECTION, SOLUTION INTRAVASCULAR
Status: COMPLETED | OUTPATIENT
Start: 2025-01-04 | End: 2025-01-04

## 2025-01-04 RX ORDER — METHOCARBAMOL 500 MG/1
1000 TABLET, FILM COATED ORAL EVERY 6 HOURS
Status: DISCONTINUED | OUTPATIENT
Start: 2025-01-04 | End: 2025-01-05

## 2025-01-04 RX ORDER — MORPHINE SULFATE 2 MG/ML
2 INJECTION, SOLUTION INTRAMUSCULAR; INTRAVENOUS
Status: DISCONTINUED | OUTPATIENT
Start: 2025-01-04 | End: 2025-01-14 | Stop reason: HOSPADM

## 2025-01-04 RX ORDER — SODIUM CHLORIDE, SODIUM LACTATE, POTASSIUM CHLORIDE, CALCIUM CHLORIDE 600; 310; 30; 20 MG/100ML; MG/100ML; MG/100ML; MG/100ML
INJECTION, SOLUTION INTRAVENOUS CONTINUOUS
Status: DISCONTINUED | OUTPATIENT
Start: 2025-01-04 | End: 2025-01-06

## 2025-01-04 RX ORDER — MORPHINE SULFATE 4 MG/ML
4 INJECTION, SOLUTION INTRAMUSCULAR; INTRAVENOUS
Status: DISCONTINUED | OUTPATIENT
Start: 2025-01-04 | End: 2025-01-14 | Stop reason: HOSPADM

## 2025-01-04 RX ORDER — METHOCARBAMOL 500 MG/1
750 TABLET, FILM COATED ORAL EVERY 6 HOURS
Status: DISCONTINUED | OUTPATIENT
Start: 2025-01-04 | End: 2025-01-04

## 2025-01-04 RX ORDER — ALBUTEROL SULFATE 0.83 MG/ML
2.5 SOLUTION RESPIRATORY (INHALATION) EVERY 6 HOURS PRN
Status: DISCONTINUED | OUTPATIENT
Start: 2025-01-04 | End: 2025-01-14 | Stop reason: HOSPADM

## 2025-01-04 RX ORDER — ALBUTEROL SULFATE 90 UG/1
2 INHALANT RESPIRATORY (INHALATION) EVERY 6 HOURS PRN
Status: DISCONTINUED | OUTPATIENT
Start: 2025-01-04 | End: 2025-01-04 | Stop reason: CLARIF

## 2025-01-04 RX ADMIN — KETOROLAC TROMETHAMINE 30 MG: 30 INJECTION, SOLUTION INTRAMUSCULAR at 20:18

## 2025-01-04 RX ADMIN — ACETAMINOPHEN 650 MG: 325 TABLET ORAL at 08:08

## 2025-01-04 RX ADMIN — MORPHINE SULFATE 4 MG: 4 INJECTION, SOLUTION INTRAMUSCULAR; INTRAVENOUS at 13:15

## 2025-01-04 RX ADMIN — SODIUM CHLORIDE, PRESERVATIVE FREE 10 ML: 5 INJECTION INTRAVENOUS at 08:11

## 2025-01-04 RX ADMIN — METHOCARBAMOL 1000 MG: 500 TABLET ORAL at 13:54

## 2025-01-04 RX ADMIN — PIPERACILLIN AND TAZOBACTAM 3375 MG: 3; .375 INJECTION, POWDER, LYOPHILIZED, FOR SOLUTION INTRAVENOUS at 06:26

## 2025-01-04 RX ADMIN — METHOCARBAMOL 750 MG: 500 TABLET ORAL at 08:30

## 2025-01-04 RX ADMIN — DULOXETINE HYDROCHLORIDE 60 MG: 60 CAPSULE, DELAYED RELEASE ORAL at 20:18

## 2025-01-04 RX ADMIN — PIPERACILLIN AND TAZOBACTAM 3375 MG: 3; .375 INJECTION, POWDER, LYOPHILIZED, FOR SOLUTION INTRAVENOUS at 22:26

## 2025-01-04 RX ADMIN — KETOROLAC TROMETHAMINE 30 MG: 30 INJECTION, SOLUTION INTRAMUSCULAR at 02:30

## 2025-01-04 RX ADMIN — MONTELUKAST 10 MG: 10 TABLET, FILM COATED ORAL at 20:18

## 2025-01-04 RX ADMIN — METHOCARBAMOL 1000 MG: 500 TABLET ORAL at 20:18

## 2025-01-04 RX ADMIN — ACETAMINOPHEN 650 MG: 325 TABLET ORAL at 20:18

## 2025-01-04 RX ADMIN — KETOROLAC TROMETHAMINE 30 MG: 30 INJECTION, SOLUTION INTRAMUSCULAR at 08:08

## 2025-01-04 RX ADMIN — SODIUM CHLORIDE, POTASSIUM CHLORIDE, SODIUM LACTATE AND CALCIUM CHLORIDE: 600; 310; 30; 20 INJECTION, SOLUTION INTRAVENOUS at 18:26

## 2025-01-04 RX ADMIN — ENOXAPARIN SODIUM 40 MG: 100 INJECTION SUBCUTANEOUS at 08:10

## 2025-01-04 RX ADMIN — SODIUM CHLORIDE, POTASSIUM CHLORIDE, SODIUM LACTATE AND CALCIUM CHLORIDE: 600; 310; 30; 20 INJECTION, SOLUTION INTRAVENOUS at 08:49

## 2025-01-04 RX ADMIN — BISACODYL 5 MG: 5 TABLET, COATED ORAL at 08:08

## 2025-01-04 RX ADMIN — INSULIN LISPRO 2 UNITS: 100 INJECTION, SOLUTION INTRAVENOUS; SUBCUTANEOUS at 16:35

## 2025-01-04 RX ADMIN — MORPHINE SULFATE 4 MG: 4 INJECTION, SOLUTION INTRAMUSCULAR; INTRAVENOUS at 06:28

## 2025-01-04 RX ADMIN — OXYCODONE 10 MG: 5 TABLET ORAL at 08:08

## 2025-01-04 RX ADMIN — LEVOTHYROXINE SODIUM 88 MCG: 0.09 TABLET ORAL at 06:28

## 2025-01-04 RX ADMIN — ACETAMINOPHEN 650 MG: 325 TABLET ORAL at 13:55

## 2025-01-04 RX ADMIN — KETOROLAC TROMETHAMINE 30 MG: 30 INJECTION, SOLUTION INTRAMUSCULAR at 13:54

## 2025-01-04 RX ADMIN — FAMOTIDINE 20 MG: 20 TABLET, FILM COATED ORAL at 20:18

## 2025-01-04 RX ADMIN — OXYCODONE 10 MG: 5 TABLET ORAL at 22:24

## 2025-01-04 RX ADMIN — PIPERACILLIN AND TAZOBACTAM 3375 MG: 3; .375 INJECTION, POWDER, LYOPHILIZED, FOR SOLUTION INTRAVENOUS at 13:56

## 2025-01-04 RX ADMIN — METHOCARBAMOL 1000 MG: 100 INJECTION, SOLUTION INTRAMUSCULAR; INTRAVENOUS at 04:16

## 2025-01-04 RX ADMIN — IOPAMIDOL 75 ML: 755 INJECTION, SOLUTION INTRAVENOUS at 10:09

## 2025-01-04 RX ADMIN — INSULIN LISPRO 2 UNITS: 100 INJECTION, SOLUTION INTRAVENOUS; SUBCUTANEOUS at 20:18

## 2025-01-04 RX ADMIN — ALBUTEROL SULFATE 2.5 MG: 2.5 SOLUTION RESPIRATORY (INHALATION) at 18:33

## 2025-01-04 RX ADMIN — ACETAMINOPHEN 650 MG: 325 TABLET ORAL at 02:30

## 2025-01-04 RX ADMIN — FAMOTIDINE 20 MG: 20 TABLET, FILM COATED ORAL at 08:08

## 2025-01-04 RX ADMIN — OXYCODONE 10 MG: 5 TABLET ORAL at 18:22

## 2025-01-04 ASSESSMENT — PAIN SCALES - GENERAL
PAINLEVEL_OUTOF10: 9
PAINLEVEL_OUTOF10: 10
PAINLEVEL_OUTOF10: 9
PAINLEVEL_OUTOF10: 10
PAINLEVEL_OUTOF10: 10
PAINLEVEL_OUTOF10: 8
PAINLEVEL_OUTOF10: 10

## 2025-01-04 ASSESSMENT — PAIN DESCRIPTION - ORIENTATION
ORIENTATION: RIGHT
ORIENTATION: LEFT
ORIENTATION: RIGHT
ORIENTATION: MID
ORIENTATION: INNER
ORIENTATION: RIGHT;MID

## 2025-01-04 ASSESSMENT — PAIN DESCRIPTION - DESCRIPTORS
DESCRIPTORS: ACHING;SORE
DESCRIPTORS: DISCOMFORT;SORE;TENDER
DESCRIPTORS: SHARP;SHOOTING;STABBING
DESCRIPTORS: DISCOMFORT;NAGGING;SHARP
DESCRIPTORS: ACHING
DESCRIPTORS: ACHING;THROBBING;SORE;TENDER

## 2025-01-04 ASSESSMENT — PAIN DESCRIPTION - LOCATION
LOCATION: ABDOMEN

## 2025-01-04 ASSESSMENT — PAIN DESCRIPTION - PAIN TYPE: TYPE: SURGICAL PAIN

## 2025-01-04 ASSESSMENT — PAIN - FUNCTIONAL ASSESSMENT: PAIN_FUNCTIONAL_ASSESSMENT: ACTIVITIES ARE NOT PREVENTED

## 2025-01-05 LAB
ALBUMIN SERPL-MCNC: 3 G/DL (ref 3.5–5.2)
ALP SERPL-CCNC: 64 U/L (ref 35–104)
ALT SERPL-CCNC: 21 U/L (ref 0–32)
ANION GAP SERPL CALCULATED.3IONS-SCNC: 6 MMOL/L (ref 7–16)
AST SERPL-CCNC: 9 U/L (ref 0–31)
BASOPHILS # BLD: 0.03 K/UL (ref 0–0.2)
BASOPHILS NFR BLD: 0 % (ref 0–2)
BILIRUB SERPL-MCNC: 0.3 MG/DL (ref 0–1.2)
BUN SERPL-MCNC: 16 MG/DL (ref 6–23)
CALCIUM SERPL-MCNC: 8.9 MG/DL (ref 8.6–10.2)
CHLORIDE SERPL-SCNC: 105 MMOL/L (ref 98–107)
CO2 SERPL-SCNC: 26 MMOL/L (ref 22–29)
CREAT SERPL-MCNC: 0.8 MG/DL (ref 0.5–1)
EOSINOPHIL # BLD: 0.12 K/UL (ref 0.05–0.5)
EOSINOPHILS RELATIVE PERCENT: 2 % (ref 0–6)
ERYTHROCYTE [DISTWIDTH] IN BLOOD BY AUTOMATED COUNT: 17.3 % (ref 11.5–15)
GFR, ESTIMATED: 89 ML/MIN/1.73M2
GLUCOSE BLD-MCNC: 138 MG/DL (ref 74–99)
GLUCOSE BLD-MCNC: 176 MG/DL (ref 74–99)
GLUCOSE BLD-MCNC: 202 MG/DL (ref 74–99)
GLUCOSE BLD-MCNC: 205 MG/DL (ref 74–99)
GLUCOSE SERPL-MCNC: 156 MG/DL (ref 74–99)
HCT VFR BLD AUTO: 32.4 % (ref 34–48)
HGB BLD-MCNC: 10.1 G/DL (ref 11.5–15.5)
IMM GRANULOCYTES # BLD AUTO: 0.04 K/UL (ref 0–0.58)
IMM GRANULOCYTES NFR BLD: 1 % (ref 0–5)
LYMPHOCYTES NFR BLD: 0.84 K/UL (ref 1.5–4)
LYMPHOCYTES RELATIVE PERCENT: 12 % (ref 20–42)
MCH RBC QN AUTO: 28.5 PG (ref 26–35)
MCHC RBC AUTO-ENTMCNC: 31.2 G/DL (ref 32–34.5)
MCV RBC AUTO: 91.5 FL (ref 80–99.9)
MONOCYTES NFR BLD: 0.42 K/UL (ref 0.1–0.95)
MONOCYTES NFR BLD: 6 % (ref 2–12)
NEUTROPHILS NFR BLD: 79 % (ref 43–80)
NEUTS SEG NFR BLD: 5.59 K/UL (ref 1.8–7.3)
PLATELET # BLD AUTO: 326 K/UL (ref 130–450)
PMV BLD AUTO: 8.8 FL (ref 7–12)
POTASSIUM SERPL-SCNC: 4.4 MMOL/L (ref 3.5–5)
PROT SERPL-MCNC: 5.6 G/DL (ref 6.4–8.3)
RBC # BLD AUTO: 3.54 M/UL (ref 3.5–5.5)
SODIUM SERPL-SCNC: 137 MMOL/L (ref 132–146)
WBC OTHER # BLD: 7 K/UL (ref 4.5–11.5)

## 2025-01-05 PROCEDURE — 6370000000 HC RX 637 (ALT 250 FOR IP): Performed by: STUDENT IN AN ORGANIZED HEALTH CARE EDUCATION/TRAINING PROGRAM

## 2025-01-05 PROCEDURE — 2700000000 HC OXYGEN THERAPY PER DAY

## 2025-01-05 PROCEDURE — 6370000000 HC RX 637 (ALT 250 FOR IP): Performed by: SURGERY

## 2025-01-05 PROCEDURE — 2580000003 HC RX 258: Performed by: SURGERY

## 2025-01-05 PROCEDURE — 2580000003 HC RX 258: Performed by: STUDENT IN AN ORGANIZED HEALTH CARE EDUCATION/TRAINING PROGRAM

## 2025-01-05 PROCEDURE — 6370000000 HC RX 637 (ALT 250 FOR IP)

## 2025-01-05 PROCEDURE — 1200000000 HC SEMI PRIVATE

## 2025-01-05 PROCEDURE — 80053 COMPREHEN METABOLIC PANEL: CPT

## 2025-01-05 PROCEDURE — 85025 COMPLETE CBC W/AUTO DIFF WBC: CPT

## 2025-01-05 PROCEDURE — 94660 CPAP INITIATION&MGMT: CPT

## 2025-01-05 PROCEDURE — 6360000002 HC RX W HCPCS: Performed by: STUDENT IN AN ORGANIZED HEALTH CARE EDUCATION/TRAINING PROGRAM

## 2025-01-05 PROCEDURE — 82962 GLUCOSE BLOOD TEST: CPT

## 2025-01-05 PROCEDURE — 6360000002 HC RX W HCPCS: Performed by: SURGERY

## 2025-01-05 PROCEDURE — 36415 COLL VENOUS BLD VENIPUNCTURE: CPT

## 2025-01-05 RX ORDER — KETOROLAC TROMETHAMINE 30 MG/ML
30 INJECTION, SOLUTION INTRAMUSCULAR; INTRAVENOUS EVERY 6 HOURS
Status: COMPLETED | OUTPATIENT
Start: 2025-01-05 | End: 2025-01-07

## 2025-01-05 RX ORDER — METHOCARBAMOL 100 MG/ML
1000 INJECTION, SOLUTION INTRAMUSCULAR; INTRAVENOUS EVERY 6 HOURS
Status: DISCONTINUED | OUTPATIENT
Start: 2025-01-05 | End: 2025-01-05 | Stop reason: DRUGHIGH

## 2025-01-05 RX ORDER — METHOCARBAMOL 100 MG/ML
750 INJECTION, SOLUTION INTRAMUSCULAR; INTRAVENOUS EVERY 6 HOURS
Status: DISPENSED | OUTPATIENT
Start: 2025-01-05 | End: 2025-01-08

## 2025-01-05 RX ADMIN — OXYCODONE 10 MG: 5 TABLET ORAL at 14:36

## 2025-01-05 RX ADMIN — INSULIN LISPRO 2 UNITS: 100 INJECTION, SOLUTION INTRAVENOUS; SUBCUTANEOUS at 11:28

## 2025-01-05 RX ADMIN — METHOCARBAMOL 1000 MG: 500 TABLET ORAL at 07:58

## 2025-01-05 RX ADMIN — PIPERACILLIN AND TAZOBACTAM 3375 MG: 3; .375 INJECTION, POWDER, LYOPHILIZED, FOR SOLUTION INTRAVENOUS at 05:54

## 2025-01-05 RX ADMIN — ACETAMINOPHEN 650 MG: 325 TABLET ORAL at 20:04

## 2025-01-05 RX ADMIN — BISACODYL 5 MG: 5 TABLET, COATED ORAL at 07:58

## 2025-01-05 RX ADMIN — FAMOTIDINE 20 MG: 20 TABLET, FILM COATED ORAL at 07:58

## 2025-01-05 RX ADMIN — MAGNESIUM HYDROXIDE 30 ML: 400 SUSPENSION ORAL at 08:25

## 2025-01-05 RX ADMIN — MONTELUKAST 10 MG: 10 TABLET, FILM COATED ORAL at 20:04

## 2025-01-05 RX ADMIN — ACETAMINOPHEN 650 MG: 325 TABLET ORAL at 02:21

## 2025-01-05 RX ADMIN — ACETAMINOPHEN 650 MG: 325 TABLET ORAL at 08:02

## 2025-01-05 RX ADMIN — OXYCODONE 10 MG: 5 TABLET ORAL at 03:45

## 2025-01-05 RX ADMIN — SODIUM CHLORIDE, POTASSIUM CHLORIDE, SODIUM LACTATE AND CALCIUM CHLORIDE: 600; 310; 30; 20 INJECTION, SOLUTION INTRAVENOUS at 14:35

## 2025-01-05 RX ADMIN — LEVOTHYROXINE SODIUM 88 MCG: 0.09 TABLET ORAL at 06:03

## 2025-01-05 RX ADMIN — INSULIN LISPRO 2 UNITS: 100 INJECTION, SOLUTION INTRAVENOUS; SUBCUTANEOUS at 20:04

## 2025-01-05 RX ADMIN — KETOROLAC TROMETHAMINE 30 MG: 30 INJECTION, SOLUTION INTRAMUSCULAR at 02:21

## 2025-01-05 RX ADMIN — FAMOTIDINE 20 MG: 20 TABLET, FILM COATED ORAL at 20:04

## 2025-01-05 RX ADMIN — ACETAMINOPHEN 650 MG: 325 TABLET ORAL at 14:42

## 2025-01-05 RX ADMIN — KETOROLAC TROMETHAMINE 30 MG: 30 INJECTION, SOLUTION INTRAMUSCULAR at 14:38

## 2025-01-05 RX ADMIN — DULOXETINE HYDROCHLORIDE 60 MG: 60 CAPSULE, DELAYED RELEASE ORAL at 20:04

## 2025-01-05 RX ADMIN — ENOXAPARIN SODIUM 40 MG: 100 INJECTION SUBCUTANEOUS at 07:58

## 2025-01-05 RX ADMIN — METHOCARBAMOL 750 MG: 100 INJECTION, SOLUTION INTRAMUSCULAR; INTRAVENOUS at 20:04

## 2025-01-05 RX ADMIN — ONDANSETRON 4 MG: 2 INJECTION, SOLUTION INTRAMUSCULAR; INTRAVENOUS at 22:51

## 2025-01-05 RX ADMIN — OXYCODONE 10 MG: 5 TABLET ORAL at 19:10

## 2025-01-05 RX ADMIN — MORPHINE SULFATE 4 MG: 4 INJECTION, SOLUTION INTRAMUSCULAR; INTRAVENOUS at 20:18

## 2025-01-05 RX ADMIN — KETOROLAC TROMETHAMINE 30 MG: 30 INJECTION, SOLUTION INTRAMUSCULAR at 20:04

## 2025-01-05 RX ADMIN — PIPERACILLIN AND TAZOBACTAM 3375 MG: 3; .375 INJECTION, POWDER, LYOPHILIZED, FOR SOLUTION INTRAVENOUS at 22:28

## 2025-01-05 RX ADMIN — MORPHINE SULFATE 4 MG: 4 INJECTION, SOLUTION INTRAMUSCULAR; INTRAVENOUS at 05:34

## 2025-01-05 RX ADMIN — KETOROLAC TROMETHAMINE 30 MG: 30 INJECTION, SOLUTION INTRAMUSCULAR at 07:58

## 2025-01-05 RX ADMIN — METHOCARBAMOL 1000 MG: 500 TABLET ORAL at 02:21

## 2025-01-05 RX ADMIN — METHOCARBAMOL 750 MG: 100 INJECTION, SOLUTION INTRAMUSCULAR; INTRAVENOUS at 14:38

## 2025-01-05 RX ADMIN — PIPERACILLIN AND TAZOBACTAM 3375 MG: 3; .375 INJECTION, POWDER, LYOPHILIZED, FOR SOLUTION INTRAVENOUS at 14:38

## 2025-01-05 ASSESSMENT — PAIN DESCRIPTION - LOCATION
LOCATION: ABDOMEN

## 2025-01-05 ASSESSMENT — PAIN SCALES - GENERAL
PAINLEVEL_OUTOF10: 9
PAINLEVEL_OUTOF10: 8
PAINLEVEL_OUTOF10: 4
PAINLEVEL_OUTOF10: 9
PAINLEVEL_OUTOF10: 9
PAINLEVEL_OUTOF10: 8
PAINLEVEL_OUTOF10: 7
PAINLEVEL_OUTOF10: 10

## 2025-01-05 ASSESSMENT — PAIN DESCRIPTION - ORIENTATION
ORIENTATION: INNER
ORIENTATION: RIGHT
ORIENTATION: RIGHT

## 2025-01-05 ASSESSMENT — PAIN DESCRIPTION - DESCRIPTORS
DESCRIPTORS: ACHING;JABBING;PRESSURE
DESCRIPTORS: DISCOMFORT;CRAMPING;SORE
DESCRIPTORS: ACHING;SORE
DESCRIPTORS: SORE
DESCRIPTORS: SORE
DESCRIPTORS: DISCOMFORT
DESCRIPTORS: DISCOMFORT;SORE;TENDER

## 2025-01-06 ENCOUNTER — APPOINTMENT (OUTPATIENT)
Dept: CT IMAGING | Age: 60
DRG: 231 | End: 2025-01-06
Attending: SURGERY
Payer: COMMERCIAL

## 2025-01-06 ENCOUNTER — ANESTHESIA EVENT (OUTPATIENT)
Dept: OPERATING ROOM | Age: 60
End: 2025-01-06
Payer: COMMERCIAL

## 2025-01-06 ENCOUNTER — ANESTHESIA (OUTPATIENT)
Dept: OPERATING ROOM | Age: 60
End: 2025-01-06
Payer: COMMERCIAL

## 2025-01-06 LAB
ALBUMIN SERPL-MCNC: 2.8 G/DL (ref 3.5–5.2)
ALP SERPL-CCNC: 90 U/L (ref 35–104)
ALT SERPL-CCNC: 17 U/L (ref 0–32)
ANION GAP SERPL CALCULATED.3IONS-SCNC: 9 MMOL/L (ref 7–16)
AST SERPL-CCNC: 10 U/L (ref 0–31)
BASOPHILS # BLD: 0 K/UL (ref 0–0.2)
BASOPHILS NFR BLD: 0 % (ref 0–2)
BILIRUB SERPL-MCNC: 0.4 MG/DL (ref 0–1.2)
BUN SERPL-MCNC: 16 MG/DL (ref 6–23)
CALCIUM SERPL-MCNC: 9.6 MG/DL (ref 8.6–10.2)
CHLORIDE SERPL-SCNC: 104 MMOL/L (ref 98–107)
CO2 SERPL-SCNC: 25 MMOL/L (ref 22–29)
CREAT SERPL-MCNC: 0.7 MG/DL (ref 0.5–1)
EOSINOPHIL # BLD: 0.35 K/UL (ref 0.05–0.5)
EOSINOPHILS RELATIVE PERCENT: 6 % (ref 0–6)
ERYTHROCYTE [DISTWIDTH] IN BLOOD BY AUTOMATED COUNT: 17 % (ref 11.5–15)
GFR, ESTIMATED: >90 ML/MIN/1.73M2
GLUCOSE BLD-MCNC: 132 MG/DL (ref 74–99)
GLUCOSE BLD-MCNC: 174 MG/DL (ref 74–99)
GLUCOSE BLD-MCNC: 189 MG/DL (ref 74–99)
GLUCOSE SERPL-MCNC: 117 MG/DL (ref 74–99)
HCT VFR BLD AUTO: 35 % (ref 34–48)
HGB BLD-MCNC: 11.1 G/DL (ref 11.5–15.5)
LYMPHOCYTES NFR BLD: 0.5 K/UL (ref 1.5–4)
LYMPHOCYTES RELATIVE PERCENT: 9 % (ref 20–42)
MCH RBC QN AUTO: 29.1 PG (ref 26–35)
MCHC RBC AUTO-ENTMCNC: 31.7 G/DL (ref 32–34.5)
MCV RBC AUTO: 91.6 FL (ref 80–99.9)
METAMYELOCYTES ABSOLUTE COUNT: 0.1 K/UL (ref 0–0.12)
METAMYELOCYTES: 2 % (ref 0–1)
MONOCYTES NFR BLD: 0.3 K/UL (ref 0.1–0.95)
MONOCYTES NFR BLD: 5 % (ref 2–12)
NEUTROPHILS NFR BLD: 78 % (ref 43–80)
NEUTS SEG NFR BLD: 4.54 K/UL (ref 1.8–7.3)
PLATELET # BLD AUTO: 363 K/UL (ref 130–450)
PMV BLD AUTO: 8.9 FL (ref 7–12)
POTASSIUM SERPL-SCNC: 4.3 MMOL/L (ref 3.5–5)
PROT SERPL-MCNC: 5.7 G/DL (ref 6.4–8.3)
RBC # BLD AUTO: 3.82 M/UL (ref 3.5–5.5)
RBC # BLD: ABNORMAL 10*6/UL
SODIUM SERPL-SCNC: 138 MMOL/L (ref 132–146)
WBC OTHER # BLD: 5.8 K/UL (ref 4.5–11.5)

## 2025-01-06 PROCEDURE — 0DJD8ZZ INSPECTION OF LOWER INTESTINAL TRACT, VIA NATURAL OR ARTIFICIAL OPENING ENDOSCOPIC: ICD-10-PCS | Performed by: SURGERY

## 2025-01-06 PROCEDURE — 1200000000 HC SEMI PRIVATE

## 2025-01-06 PROCEDURE — 94660 CPAP INITIATION&MGMT: CPT

## 2025-01-06 PROCEDURE — 3700000001 HC ADD 15 MINUTES (ANESTHESIA): Performed by: SURGERY

## 2025-01-06 PROCEDURE — 2709999900 HC NON-CHARGEABLE SUPPLY: Performed by: SURGERY

## 2025-01-06 PROCEDURE — 6360000002 HC RX W HCPCS: Performed by: NURSE ANESTHETIST, CERTIFIED REGISTERED

## 2025-01-06 PROCEDURE — 2580000003 HC RX 258: Performed by: NURSE ANESTHETIST, CERTIFIED REGISTERED

## 2025-01-06 PROCEDURE — 7100000000 HC PACU RECOVERY - FIRST 15 MIN: Performed by: SURGERY

## 2025-01-06 PROCEDURE — 87102 FUNGUS ISOLATION CULTURE: CPT

## 2025-01-06 PROCEDURE — 6360000002 HC RX W HCPCS: Performed by: SURGERY

## 2025-01-06 PROCEDURE — 3600000014 HC SURGERY LEVEL 4 ADDTL 15MIN: Performed by: SURGERY

## 2025-01-06 PROCEDURE — 0W9J0ZZ DRAINAGE OF PELVIC CAVITY, OPEN APPROACH: ICD-10-PCS | Performed by: SURGERY

## 2025-01-06 PROCEDURE — 85025 COMPLETE CBC W/AUTO DIFF WBC: CPT

## 2025-01-06 PROCEDURE — 80053 COMPREHEN METABOLIC PANEL: CPT

## 2025-01-06 PROCEDURE — 44144 PARTIAL REMOVAL OF COLON: CPT | Performed by: SURGERY

## 2025-01-06 PROCEDURE — 3600000004 HC SURGERY LEVEL 4 BASE: Performed by: SURGERY

## 2025-01-06 PROCEDURE — 0DBP0ZZ EXCISION OF RECTUM, OPEN APPROACH: ICD-10-PCS | Performed by: SURGERY

## 2025-01-06 PROCEDURE — 87070 CULTURE OTHR SPECIMN AEROBIC: CPT

## 2025-01-06 PROCEDURE — 87181 SC STD AGAR DILUTION PER AGT: CPT

## 2025-01-06 PROCEDURE — 0DBN0ZZ EXCISION OF SIGMOID COLON, OPEN APPROACH: ICD-10-PCS | Performed by: SURGERY

## 2025-01-06 PROCEDURE — 6360000004 HC RX CONTRAST MEDICATION: Performed by: RADIOLOGY

## 2025-01-06 PROCEDURE — 2500000003 HC RX 250 WO HCPCS: Performed by: SURGERY

## 2025-01-06 PROCEDURE — 49020 DRAINAGE ABDOM ABSCESS OPEN: CPT | Performed by: SURGERY

## 2025-01-06 PROCEDURE — 2580000003 HC RX 258: Performed by: SURGERY

## 2025-01-06 PROCEDURE — 82962 GLUCOSE BLOOD TEST: CPT

## 2025-01-06 PROCEDURE — 2700000000 HC OXYGEN THERAPY PER DAY

## 2025-01-06 PROCEDURE — 6360000002 HC RX W HCPCS: Performed by: STUDENT IN AN ORGANIZED HEALTH CARE EDUCATION/TRAINING PROGRAM

## 2025-01-06 PROCEDURE — 0D1M4Z4 BYPASS DESCENDING COLON TO CUTANEOUS, PERCUTANEOUS ENDOSCOPIC APPROACH: ICD-10-PCS | Performed by: SURGERY

## 2025-01-06 PROCEDURE — 87205 SMEAR GRAM STAIN: CPT

## 2025-01-06 PROCEDURE — 2720000010 HC SURG SUPPLY STERILE: Performed by: SURGERY

## 2025-01-06 PROCEDURE — 2500000003 HC RX 250 WO HCPCS: Performed by: NURSE ANESTHETIST, CERTIFIED REGISTERED

## 2025-01-06 PROCEDURE — 74177 CT ABD & PELVIS W/CONTRAST: CPT

## 2025-01-06 PROCEDURE — 87077 CULTURE AEROBIC IDENTIFY: CPT

## 2025-01-06 PROCEDURE — 3700000000 HC ANESTHESIA ATTENDED CARE: Performed by: SURGERY

## 2025-01-06 PROCEDURE — 36415 COLL VENOUS BLD VENIPUNCTURE: CPT

## 2025-01-06 PROCEDURE — 6360000002 HC RX W HCPCS: Performed by: ANESTHESIOLOGY

## 2025-01-06 PROCEDURE — 7100000001 HC PACU RECOVERY - ADDTL 15 MIN: Performed by: SURGERY

## 2025-01-06 PROCEDURE — 87106 FUNGI IDENTIFICATION YEAST: CPT

## 2025-01-06 PROCEDURE — 88307 TISSUE EXAM BY PATHOLOGIST: CPT

## 2025-01-06 PROCEDURE — 6370000000 HC RX 637 (ALT 250 FOR IP): Performed by: SURGERY

## 2025-01-06 PROCEDURE — 6370000000 HC RX 637 (ALT 250 FOR IP)

## 2025-01-06 PROCEDURE — 87075 CULTR BACTERIA EXCEPT BLOOD: CPT

## 2025-01-06 RX ORDER — PROPOFOL 10 MG/ML
INJECTION, EMULSION INTRAVENOUS
Status: DISCONTINUED | OUTPATIENT
Start: 2025-01-06 | End: 2025-01-06 | Stop reason: SDUPTHER

## 2025-01-06 RX ORDER — PHENYLEPHRINE HCL IN 0.9% NACL 1 MG/10 ML
SYRINGE (ML) INTRAVENOUS
Status: DISCONTINUED | OUTPATIENT
Start: 2025-01-06 | End: 2025-01-06 | Stop reason: SDUPTHER

## 2025-01-06 RX ORDER — DEXAMETHASONE SODIUM PHOSPHATE 10 MG/ML
INJECTION, SOLUTION INTRAMUSCULAR; INTRAVENOUS
Status: DISCONTINUED | OUTPATIENT
Start: 2025-01-06 | End: 2025-01-06 | Stop reason: SDUPTHER

## 2025-01-06 RX ORDER — SODIUM CHLORIDE 0.9 % (FLUSH) 0.9 %
5-40 SYRINGE (ML) INJECTION EVERY 12 HOURS SCHEDULED
Status: DISCONTINUED | OUTPATIENT
Start: 2025-01-06 | End: 2025-01-06 | Stop reason: HOSPADM

## 2025-01-06 RX ORDER — SODIUM CHLORIDE, SODIUM LACTATE, POTASSIUM CHLORIDE, CALCIUM CHLORIDE 600; 310; 30; 20 MG/100ML; MG/100ML; MG/100ML; MG/100ML
INJECTION, SOLUTION INTRAVENOUS CONTINUOUS
Status: DISCONTINUED | OUTPATIENT
Start: 2025-01-06 | End: 2025-01-07

## 2025-01-06 RX ORDER — LIDOCAINE HYDROCHLORIDE 20 MG/ML
INJECTION, SOLUTION INTRAVENOUS
Status: DISCONTINUED | OUTPATIENT
Start: 2025-01-06 | End: 2025-01-06 | Stop reason: SDUPTHER

## 2025-01-06 RX ORDER — SODIUM CHLORIDE 0.9 % (FLUSH) 0.9 %
5-40 SYRINGE (ML) INJECTION PRN
Status: DISCONTINUED | OUTPATIENT
Start: 2025-01-06 | End: 2025-01-06 | Stop reason: HOSPADM

## 2025-01-06 RX ORDER — NALOXONE HYDROCHLORIDE 0.4 MG/ML
INJECTION, SOLUTION INTRAMUSCULAR; INTRAVENOUS; SUBCUTANEOUS PRN
Status: DISCONTINUED | OUTPATIENT
Start: 2025-01-06 | End: 2025-01-06 | Stop reason: HOSPADM

## 2025-01-06 RX ORDER — IOPAMIDOL 755 MG/ML
75 INJECTION, SOLUTION INTRAVASCULAR
Status: COMPLETED | OUTPATIENT
Start: 2025-01-06 | End: 2025-01-06

## 2025-01-06 RX ORDER — SUCCINYLCHOLINE/SOD CL,ISO/PF 200MG/10ML
SYRINGE (ML) INTRAVENOUS
Status: DISCONTINUED | OUTPATIENT
Start: 2025-01-06 | End: 2025-01-06 | Stop reason: SDUPTHER

## 2025-01-06 RX ORDER — PROCHLORPERAZINE EDISYLATE 5 MG/ML
5 INJECTION INTRAMUSCULAR; INTRAVENOUS
Status: COMPLETED | OUTPATIENT
Start: 2025-01-06 | End: 2025-01-06

## 2025-01-06 RX ORDER — IOPAMIDOL 755 MG/ML
18 INJECTION, SOLUTION INTRAVASCULAR
Status: COMPLETED | OUTPATIENT
Start: 2025-01-06 | End: 2025-01-06

## 2025-01-06 RX ORDER — NEOSTIGMINE METHYLSULFATE 1 MG/ML
INJECTION, SOLUTION INTRAVENOUS
Status: DISCONTINUED | OUTPATIENT
Start: 2025-01-06 | End: 2025-01-06 | Stop reason: SDUPTHER

## 2025-01-06 RX ORDER — GLYCOPYRROLATE 0.2 MG/ML
INJECTION INTRAMUSCULAR; INTRAVENOUS
Status: DISCONTINUED | OUTPATIENT
Start: 2025-01-06 | End: 2025-01-06 | Stop reason: SDUPTHER

## 2025-01-06 RX ORDER — METHOCARBAMOL 100 MG/ML
1000 INJECTION, SOLUTION INTRAMUSCULAR; INTRAVENOUS PRN
Status: DISCONTINUED | OUTPATIENT
Start: 2025-01-06 | End: 2025-01-06 | Stop reason: HOSPADM

## 2025-01-06 RX ORDER — CEFTRIAXONE 1 G/1
INJECTION, POWDER, FOR SOLUTION INTRAMUSCULAR; INTRAVENOUS
Status: DISCONTINUED
Start: 2025-01-06 | End: 2025-01-06

## 2025-01-06 RX ORDER — SODIUM CHLORIDE 9 MG/ML
INJECTION, SOLUTION INTRAVENOUS PRN
Status: DISCONTINUED | OUTPATIENT
Start: 2025-01-06 | End: 2025-01-06 | Stop reason: HOSPADM

## 2025-01-06 RX ORDER — MEPERIDINE HYDROCHLORIDE 25 MG/ML
12.5 INJECTION INTRAMUSCULAR; INTRAVENOUS; SUBCUTANEOUS EVERY 5 MIN PRN
Status: DISCONTINUED | OUTPATIENT
Start: 2025-01-06 | End: 2025-01-06 | Stop reason: HOSPADM

## 2025-01-06 RX ORDER — SODIUM CHLORIDE 9 MG/ML
INJECTION, SOLUTION INTRAVENOUS
Status: DISCONTINUED | OUTPATIENT
Start: 2025-01-06 | End: 2025-01-06 | Stop reason: SDUPTHER

## 2025-01-06 RX ORDER — ROCURONIUM BROMIDE 10 MG/ML
INJECTION, SOLUTION INTRAVENOUS
Status: DISCONTINUED | OUTPATIENT
Start: 2025-01-06 | End: 2025-01-06 | Stop reason: SDUPTHER

## 2025-01-06 RX ORDER — WATER 10 ML/10ML
INJECTION INTRAMUSCULAR; INTRAVENOUS; SUBCUTANEOUS
Status: DISPENSED
Start: 2025-01-06 | End: 2025-01-07

## 2025-01-06 RX ORDER — ONDANSETRON 2 MG/ML
INJECTION INTRAMUSCULAR; INTRAVENOUS
Status: DISCONTINUED | OUTPATIENT
Start: 2025-01-06 | End: 2025-01-06 | Stop reason: SDUPTHER

## 2025-01-06 RX ORDER — FENTANYL CITRATE 50 UG/ML
INJECTION, SOLUTION INTRAMUSCULAR; INTRAVENOUS
Status: DISCONTINUED | OUTPATIENT
Start: 2025-01-06 | End: 2025-01-06 | Stop reason: SDUPTHER

## 2025-01-06 RX ADMIN — ONDANSETRON 4 MG: 2 INJECTION, SOLUTION INTRAMUSCULAR; INTRAVENOUS at 17:11

## 2025-01-06 RX ADMIN — LIDOCAINE HYDROCHLORIDE 60 MG: 20 INJECTION, SOLUTION INTRAVENOUS at 15:29

## 2025-01-06 RX ADMIN — MORPHINE SULFATE 4 MG: 4 INJECTION, SOLUTION INTRAMUSCULAR; INTRAVENOUS at 19:35

## 2025-01-06 RX ADMIN — PROCHLORPERAZINE EDISYLATE 5 MG: 5 INJECTION INTRAMUSCULAR; INTRAVENOUS at 18:08

## 2025-01-06 RX ADMIN — IOPAMIDOL 75 ML: 755 INJECTION, SOLUTION INTRAVENOUS at 13:23

## 2025-01-06 RX ADMIN — MORPHINE SULFATE 4 MG: 4 INJECTION, SOLUTION INTRAMUSCULAR; INTRAVENOUS at 14:38

## 2025-01-06 RX ADMIN — ROCURONIUM BROMIDE 40 MG: 10 INJECTION, SOLUTION INTRAVENOUS at 15:29

## 2025-01-06 RX ADMIN — METHOCARBAMOL 750 MG: 100 INJECTION, SOLUTION INTRAMUSCULAR; INTRAVENOUS at 14:38

## 2025-01-06 RX ADMIN — FAMOTIDINE 20 MG: 20 TABLET, FILM COATED ORAL at 08:22

## 2025-01-06 RX ADMIN — BISACODYL 5 MG: 5 TABLET, COATED ORAL at 08:22

## 2025-01-06 RX ADMIN — METHOCARBAMOL 750 MG: 100 INJECTION, SOLUTION INTRAMUSCULAR; INTRAVENOUS at 01:56

## 2025-01-06 RX ADMIN — DULOXETINE HYDROCHLORIDE 60 MG: 60 CAPSULE, DELAYED RELEASE ORAL at 20:11

## 2025-01-06 RX ADMIN — Medication 3 MG: at 17:33

## 2025-01-06 RX ADMIN — LEVOTHYROXINE SODIUM 88 MCG: 0.09 TABLET ORAL at 05:33

## 2025-01-06 RX ADMIN — PROPOFOL 170 MG: 10 INJECTION, EMULSION INTRAVENOUS at 15:29

## 2025-01-06 RX ADMIN — ENOXAPARIN SODIUM 40 MG: 100 INJECTION SUBCUTANEOUS at 08:31

## 2025-01-06 RX ADMIN — SODIUM CHLORIDE, SODIUM LACTATE, POTASSIUM CHLORIDE, AND CALCIUM CHLORIDE: .6; .31; .03; .02 INJECTION, SOLUTION INTRAVENOUS at 20:51

## 2025-01-06 RX ADMIN — SODIUM CHLORIDE, PRESERVATIVE FREE 10 ML: 5 INJECTION INTRAVENOUS at 08:30

## 2025-01-06 RX ADMIN — ROCURONIUM BROMIDE 10 MG: 10 INJECTION, SOLUTION INTRAVENOUS at 16:44

## 2025-01-06 RX ADMIN — ACETAMINOPHEN 650 MG: 325 TABLET ORAL at 20:11

## 2025-01-06 RX ADMIN — FAMOTIDINE 20 MG: 20 TABLET, FILM COATED ORAL at 20:11

## 2025-01-06 RX ADMIN — KETOROLAC TROMETHAMINE 30 MG: 30 INJECTION, SOLUTION INTRAMUSCULAR at 20:10

## 2025-01-06 RX ADMIN — MORPHINE SULFATE 4 MG: 4 INJECTION, SOLUTION INTRAMUSCULAR; INTRAVENOUS at 08:23

## 2025-01-06 RX ADMIN — KETOROLAC TROMETHAMINE 30 MG: 30 INJECTION, SOLUTION INTRAMUSCULAR at 14:38

## 2025-01-06 RX ADMIN — Medication 100 MCG: at 15:39

## 2025-01-06 RX ADMIN — OXYCODONE 10 MG: 5 TABLET ORAL at 23:56

## 2025-01-06 RX ADMIN — PIPERACILLIN AND TAZOBACTAM 3375 MG: 3; .375 INJECTION, POWDER, LYOPHILIZED, FOR SOLUTION INTRAVENOUS at 05:34

## 2025-01-06 RX ADMIN — FENTANYL CITRATE 100 MCG: 50 INJECTION, SOLUTION INTRAMUSCULAR; INTRAVENOUS at 17:44

## 2025-01-06 RX ADMIN — HYDROMORPHONE HYDROCHLORIDE 0.5 MG: 0.5 INJECTION, SOLUTION INTRAMUSCULAR; INTRAVENOUS; SUBCUTANEOUS at 17:57

## 2025-01-06 RX ADMIN — DEXAMETHASONE SODIUM PHOSPHATE 10 MG: 10 INJECTION INTRAMUSCULAR; INTRAVENOUS at 15:29

## 2025-01-06 RX ADMIN — KETOROLAC TROMETHAMINE 30 MG: 30 INJECTION, SOLUTION INTRAMUSCULAR at 08:22

## 2025-01-06 RX ADMIN — ACETAMINOPHEN 650 MG: 325 TABLET ORAL at 01:57

## 2025-01-06 RX ADMIN — ACETAMINOPHEN 650 MG: 325 TABLET ORAL at 08:22

## 2025-01-06 RX ADMIN — METHOCARBAMOL 1000 MG: 100 INJECTION INTRAMUSCULAR; INTRAVENOUS at 18:03

## 2025-01-06 RX ADMIN — METHOCARBAMOL 750 MG: 100 INJECTION, SOLUTION INTRAMUSCULAR; INTRAVENOUS at 08:24

## 2025-01-06 RX ADMIN — GLYCOPYRROLATE 0.6 MG: 0.2 INJECTION INTRAMUSCULAR; INTRAVENOUS at 17:33

## 2025-01-06 RX ADMIN — SODIUM CHLORIDE: 9 INJECTION, SOLUTION INTRAVENOUS at 15:23

## 2025-01-06 RX ADMIN — FENTANYL CITRATE 150 MCG: 50 INJECTION, SOLUTION INTRAMUSCULAR; INTRAVENOUS at 15:29

## 2025-01-06 RX ADMIN — KETOROLAC TROMETHAMINE 30 MG: 30 INJECTION, SOLUTION INTRAMUSCULAR at 01:56

## 2025-01-06 RX ADMIN — CEFTRIAXONE SODIUM 2000 MG: 2 INJECTION, POWDER, FOR SOLUTION INTRAMUSCULAR; INTRAVENOUS at 15:36

## 2025-01-06 RX ADMIN — Medication 200 MG: at 15:29

## 2025-01-06 RX ADMIN — IOPAMIDOL 18 ML: 755 INJECTION, SOLUTION INTRAVENOUS at 13:23

## 2025-01-06 RX ADMIN — OXYCODONE 10 MG: 5 TABLET ORAL at 06:44

## 2025-01-06 RX ADMIN — Medication 100 MCG: at 16:22

## 2025-01-06 RX ADMIN — METHOCARBAMOL 750 MG: 100 INJECTION, SOLUTION INTRAMUSCULAR; INTRAVENOUS at 20:11

## 2025-01-06 RX ADMIN — Medication 100 MCG: at 17:05

## 2025-01-06 RX ADMIN — MONTELUKAST 10 MG: 10 TABLET, FILM COATED ORAL at 20:11

## 2025-01-06 RX ADMIN — SODIUM CHLORIDE, PRESERVATIVE FREE 10 ML: 5 INJECTION INTRAVENOUS at 20:12

## 2025-01-06 ASSESSMENT — PAIN DESCRIPTION - LOCATION
LOCATION: ABDOMEN

## 2025-01-06 ASSESSMENT — LIFESTYLE VARIABLES: SMOKING_STATUS: 1

## 2025-01-06 ASSESSMENT — PAIN SCALES - GENERAL
PAINLEVEL_OUTOF10: 9
PAINLEVEL_OUTOF10: 7
PAINLEVEL_OUTOF10: 9
PAINLEVEL_OUTOF10: 9
PAINLEVEL_OUTOF10: 5
PAINLEVEL_OUTOF10: 5
PAINLEVEL_OUTOF10: 10
PAINLEVEL_OUTOF10: 10
PAINLEVEL_OUTOF10: 8
PAINLEVEL_OUTOF10: 8
PAINLEVEL_OUTOF10: 5
PAINLEVEL_OUTOF10: 10
PAINLEVEL_OUTOF10: 10
PAINLEVEL_OUTOF10: 7

## 2025-01-06 ASSESSMENT — PAIN DESCRIPTION - DESCRIPTORS
DESCRIPTORS: ACHING;TENDER;DISCOMFORT
DESCRIPTORS: ACHING;CRAMPING;DISCOMFORT;SHARP
DESCRIPTORS: DISCOMFORT;SORE;TENDER
DESCRIPTORS: SHARP
DESCRIPTORS: ACHING;SHARP
DESCRIPTORS: ACHING;SORE
DESCRIPTORS: SHARP;ACHING
DESCRIPTORS: DISCOMFORT;SORE;TENDER

## 2025-01-06 ASSESSMENT — PAIN DESCRIPTION - FREQUENCY: FREQUENCY: CONTINUOUS

## 2025-01-06 ASSESSMENT — PAIN DESCRIPTION - ORIENTATION
ORIENTATION: MID
ORIENTATION: RIGHT

## 2025-01-06 ASSESSMENT — PAIN DESCRIPTION - PAIN TYPE
TYPE: SURGICAL PAIN
TYPE: SURGICAL PAIN

## 2025-01-06 ASSESSMENT — PAIN - FUNCTIONAL ASSESSMENT: PAIN_FUNCTIONAL_ASSESSMENT: PREVENTS OR INTERFERES SOME ACTIVE ACTIVITIES AND ADLS

## 2025-01-06 NOTE — OP NOTE
within the peritoneal cavity consistent with a leak.  I do very difficult time visualizing the entire anastomosis secondary to the amount of stool in the rectal vault.  At this point I was satisfied that the leak was present and the difficulty to visualize it within the peritoneal cavity made it difficult to assess the actual ability to repair it.  Therefore I elected to completely resect the anastomosis.  Blunt finger was used to dissect around the area and then a used a stapler to resect proximal to the anastomosis with a SANDRA 75 load.  At this point I could appreciate about a 30% disruption of the colorectal anastomosis.  Further blunt dissection I was able to disrupt the entire staple line and remove it.  This was passed off the table and labeled rectum.  I then used a nonabsorbable 12 inch barbed suture in order to oversew the rectal stump.  The due to the low nature of the patient's previous resection this was significantly difficult as I was below the peritoneal reflection.  We oversewed with a running suture in a figure-of-eight fashion.  I was able to reapproximate the edge of the mucosa rather well.  There was a significant inflammatory rind both acute and chronic around this area.  Once I closed it I placed a 19 Serbian Romel drain down into the pelvis and exited through the right lower quadrant secured in place with a 2-0 nylon suture.  The pelvis was then irrigated until the fluid returned clear.  I then irrigated all 4 quadrants with about 6 L of warm saline.  We then identified the descending colon and the previously resected ileorectal anastomosis.  This had mostly been mobilized at the previous operation the entire splenic flexure had been mobilized so I was able to release enough of the descending colon to reach up to the abdominal wall.  I selected a site the end colostomy in the abdominal wall an aperture was made in the left hemiabdomen at the midpoint of the rectus just cephalad to the umbilicus.

## 2025-01-06 NOTE — ANESTHESIA POSTPROCEDURE EVALUATION
Department of Anesthesiology  Postprocedure Note    Patient: Velma Mazariegos  MRN: [id number]  YOB: 1965  Date of evaluation: 1/6/2025    Procedure Summary       Date: 01/06/25 Room / Location: [address]    Anesthesia Start: 1523 Anesthesia Stop: 1750    Procedure: DIAGNOSTIC LAPAROSCOPY CONVERTED TO EXPLORATORY LAPAROTOMY, PARTIAL COLON RESECTION, DRAINAGE OF PELVIC ABSCESS, FLEXIBLE SIGMOIDOSCOPY, END COLOSTOMY, RETENTION SUTURES (Abdomen) Diagnosis:       Pain      (Pain [R52])    Surgeons: Juan David Carranza MD Responsible Provider: Shirley Holden DO    Anesthesia Type: General ASA Status: 3 - Emergent            Anesthesia Type: General    Niecy Phase I: Niecy Score: 8    Niecy Phase II:      Anesthesia Post Evaluation    Patient location during evaluation: PACU  Patient participation: complete - patient participated  Level of consciousness: awake and alert  Airway patency: patent  Nausea & Vomiting: no nausea and no vomiting  Cardiovascular status: hemodynamically stable  Respiratory status: acceptable  Hydration status: euvolemic  Pain management: adequate    No notable events documented.

## 2025-01-06 NOTE — BRIEF OP NOTE
Brief Postoperative Note      Patient: Velma Mazariegos  YOB: 1965  MRN: [id number]    Date of Procedure: 1/6/2025    Pre-Op Diagnosis Codes:      * Pain [R52]    Post-Op Diagnosis: Anastomotic leak       Procedure(s):  DIAGNOSTIC LAPAROSCOPY converted to exploratory laparotomy, drainage  open of pelvic intraabdominal abscess, flexible sigmoidoscopy, partial colon resection, end colostomy.    Surgeon(s):  Juan David Carranza MD    Assistant:  First Assistant: Sohail Flores; Roxanne Leung  Resident: Piedad Montes MD    Anesthesia: General    Estimated Blood Loss (mL): 200     Complications: None    Specimens:   ID Type Source Tests Collected by Time Destination   1 : PELVIC ABSCESS FLUID FOR CULTURE- AEROBIC, ANAEROBIC, GRAM STAIN, FUNGUS Body Fluid Fluid CULTURE, ANAEROBIC, CULTURE, FUNGUS, GRAM STAIN (Canceled), CULTURE, SURGICAL Juan David Carranza MD 1/6/2025 1558    A : RECTUM Specimen Rectum SURGICAL PATHOLOGY Juan David Carranza MD 1/6/2025 1712    B : SIGMOID COLON Tissue Colon SURGICAL PATHOLOGY Juan David Carranza MD 1/6/2025 1725        Implants:  * No implants in log *      Drains:   Urinary Catheter 01/06/25 2 Way (Active)       [REMOVED] Urinary Catheter 01/02/25 (Removed)   $ Urethral catheter insertion Inserted for procedure 01/02/25 1220   Catheter Indications Perioperative use for selected surgical procedures 01/02/25 1400   Urine Color Yellow 01/03/25 0511   Urine Appearance Clear 01/03/25 0511   Collection Container Standard 01/02/25 2047   Securement Method Securing device (Describe) 01/02/25 2047   Catheter Best Practices  Drainage tube clipped to bed;Catheter secured to thigh;Tamper seal intact;Bag below bladder;Bag not on floor;Lack of dependent loop in tubing;Drainage bag less than half full 01/02/25 1400   Status Draining;Patent 01/02/25 2047   Output (mL) 250 mL 01/03/25 0511       Findings:  Infection Present At Time Of Surgery (PATOS) (choose all levels that have

## 2025-01-06 NOTE — ANESTHESIA PRE PROCEDURE
363 01/06/2025 03:49 AM       CMP:   Lab Results   Component Value Date/Time     01/06/2025 03:49 AM    K 4.3 01/06/2025 03:49 AM     01/06/2025 03:49 AM    CO2 25 01/06/2025 03:49 AM    BUN 16 01/06/2025 03:49 AM    CREATININE 0.7 01/06/2025 03:49 AM    LABGLOM >90 01/06/2025 03:49 AM    LABGLOM >60 10/24/2023 01:17 PM    GLUCOSE 117 01/06/2025 03:49 AM    GLUCOSE 130 03/02/2012 05:04 AM    CALCIUM 9.6 01/06/2025 03:49 AM    BILITOT 0.4 01/06/2025 03:49 AM    ALKPHOS 90 01/06/2025 03:49 AM    AST 10 01/06/2025 03:49 AM    ALT 17 01/06/2025 03:49 AM       POC Tests:   Recent Labs     01/06/25  1116   POCGLU 189*       Coags:   Lab Results   Component Value Date/Time    PROTIME 14.7 09/01/2024 10:37 AM    PROTIME 12.4 01/13/2011 10:20 PM    INR 1.3 09/01/2024 10:37 AM    APTT 29.6 01/13/2011 10:20 PM       HCG (If Applicable):   Lab Results   Component Value Date    PREGTESTUR NEGATIVE 01/14/2011        ABGs: No results found for: \"PHART\", \"PO2ART\", \"ELY0DIT\", \"SDF6UKK\", \"BEART\", \"C5KMLNFP\"     Type & Screen (If Applicable):  No results found for: \"ABORH\", \"LABANTI\"    Drug/Infectious Status (If Applicable):  Lab Results   Component Value Date/Time    HEPCAB NONREACTIVE 07/24/2024 09:35 AM       COVID-19 Screening (If Applicable): No results found for: \"COVID19\"        Anesthesia Evaluation  Patient summary reviewed   no history of anesthetic complications:   Airway: Mallampati: III  TM distance: >3 FB   Neck ROM: full  Mouth opening: > = 3 FB   Dental: normal exam         Pulmonary: breath sounds clear to auscultation  (+)     sleep apnea: on CPAP,       asthma (used daily inhaler this morning): current smoker                           Cardiovascular:Negative CV ROS            Rhythm: regular  Rate: normal                    Neuro/Psych:   Negative Neuro/Psych ROS              GI/Hepatic/Renal:        (-) no morbid obesity      ROS comment: Diverticulitis of large intestine with perforation and abscess.

## 2025-01-07 LAB
ABSOLUTE PLASMA CELLS: 0.05 K/UL
ALBUMIN SERPL-MCNC: 2.4 G/DL (ref 3.5–5.2)
ALP SERPL-CCNC: 91 U/L (ref 35–104)
ALT SERPL-CCNC: 22 U/L (ref 0–32)
ANION GAP SERPL CALCULATED.3IONS-SCNC: 12 MMOL/L (ref 7–16)
AST SERPL-CCNC: 15 U/L (ref 0–31)
BASOPHILS # BLD: 0.05 K/UL (ref 0–0.2)
BASOPHILS NFR BLD: 1 % (ref 0–2)
BILIRUB SERPL-MCNC: 0.2 MG/DL (ref 0–1.2)
BUN SERPL-MCNC: 17 MG/DL (ref 6–23)
CALCIUM SERPL-MCNC: 9.2 MG/DL (ref 8.6–10.2)
CHLORIDE SERPL-SCNC: 105 MMOL/L (ref 98–107)
CO2 SERPL-SCNC: 22 MMOL/L (ref 22–29)
CREAT SERPL-MCNC: 0.7 MG/DL (ref 0.5–1)
EOSINOPHIL # BLD: 0.05 K/UL (ref 0.05–0.5)
EOSINOPHILS RELATIVE PERCENT: 1 % (ref 0–6)
ERYTHROCYTE [DISTWIDTH] IN BLOOD BY AUTOMATED COUNT: 17 % (ref 11.5–15)
GFR, ESTIMATED: >90 ML/MIN/1.73M2
GLUCOSE BLD-MCNC: 150 MG/DL (ref 74–99)
GLUCOSE BLD-MCNC: 162 MG/DL (ref 74–99)
GLUCOSE BLD-MCNC: 198 MG/DL (ref 74–99)
GLUCOSE SERPL-MCNC: 165 MG/DL (ref 74–99)
HCT VFR BLD AUTO: 34.5 % (ref 34–48)
HGB BLD-MCNC: 10.8 G/DL (ref 11.5–15.5)
LYMPHOCYTES NFR BLD: 0 K/UL (ref 1.5–4)
LYMPHOCYTES RELATIVE PERCENT: 0 % (ref 20–42)
MCH RBC QN AUTO: 28.7 PG (ref 26–35)
MCHC RBC AUTO-ENTMCNC: 31.3 G/DL (ref 32–34.5)
MCV RBC AUTO: 91.8 FL (ref 80–99.9)
METAMYELOCYTES ABSOLUTE COUNT: 0.05 K/UL (ref 0–0.12)
METAMYELOCYTES: 1 % (ref 0–1)
MONOCYTES NFR BLD: 0.37 K/UL (ref 0.1–0.95)
MONOCYTES NFR BLD: 6 % (ref 2–12)
NEUTROPHILS NFR BLD: 90 % (ref 43–80)
NEUTS SEG NFR BLD: 5.51 K/UL (ref 1.8–7.3)
PLASMA CELLS: 1 %
PLATELET # BLD AUTO: 427 K/UL (ref 130–450)
PMV BLD AUTO: 9.1 FL (ref 7–12)
POTASSIUM SERPL-SCNC: 4.6 MMOL/L (ref 3.5–5)
PROT SERPL-MCNC: 5.5 G/DL (ref 6.4–8.3)
RBC # BLD AUTO: 3.76 M/UL (ref 3.5–5.5)
RBC # BLD: ABNORMAL 10*6/UL
SODIUM SERPL-SCNC: 139 MMOL/L (ref 132–146)
SURGICAL PATHOLOGY REPORT: NORMAL
WBC # BLD: ABNORMAL 10*3/UL
WBC OTHER # BLD: 6.1 K/UL (ref 4.5–11.5)

## 2025-01-07 PROCEDURE — 6360000002 HC RX W HCPCS: Performed by: SPECIALIST

## 2025-01-07 PROCEDURE — 1200000000 HC SEMI PRIVATE

## 2025-01-07 PROCEDURE — 36415 COLL VENOUS BLD VENIPUNCTURE: CPT

## 2025-01-07 PROCEDURE — 6370000000 HC RX 637 (ALT 250 FOR IP): Performed by: SURGERY

## 2025-01-07 PROCEDURE — 82962 GLUCOSE BLOOD TEST: CPT

## 2025-01-07 PROCEDURE — 6370000000 HC RX 637 (ALT 250 FOR IP): Performed by: INTERNAL MEDICINE

## 2025-01-07 PROCEDURE — 85025 COMPLETE CBC W/AUTO DIFF WBC: CPT

## 2025-01-07 PROCEDURE — 6360000002 HC RX W HCPCS: Performed by: SURGERY

## 2025-01-07 PROCEDURE — 2500000003 HC RX 250 WO HCPCS: Performed by: SURGERY

## 2025-01-07 PROCEDURE — 99222 1ST HOSP IP/OBS MODERATE 55: CPT

## 2025-01-07 PROCEDURE — 2700000000 HC OXYGEN THERAPY PER DAY

## 2025-01-07 PROCEDURE — 94660 CPAP INITIATION&MGMT: CPT

## 2025-01-07 PROCEDURE — 80053 COMPREHEN METABOLIC PANEL: CPT

## 2025-01-07 PROCEDURE — 76937 US GUIDE VASCULAR ACCESS: CPT

## 2025-01-07 PROCEDURE — 2580000003 HC RX 258: Performed by: SURGERY

## 2025-01-07 RX ORDER — LORAZEPAM 0.5 MG/1
0.5 TABLET ORAL EVERY 4 HOURS PRN
Status: DISCONTINUED | OUTPATIENT
Start: 2025-01-07 | End: 2025-01-14 | Stop reason: HOSPADM

## 2025-01-07 RX ORDER — FLUCONAZOLE 2 MG/ML
400 INJECTION, SOLUTION INTRAVENOUS EVERY 24 HOURS
Status: DISCONTINUED | OUTPATIENT
Start: 2025-01-07 | End: 2025-01-14 | Stop reason: HOSPADM

## 2025-01-07 RX ADMIN — MORPHINE SULFATE 4 MG: 4 INJECTION, SOLUTION INTRAMUSCULAR; INTRAVENOUS at 05:27

## 2025-01-07 RX ADMIN — BISACODYL 5 MG: 5 TABLET, COATED ORAL at 08:39

## 2025-01-07 RX ADMIN — KETOROLAC TROMETHAMINE 30 MG: 30 INJECTION, SOLUTION INTRAMUSCULAR at 08:28

## 2025-01-07 RX ADMIN — DULOXETINE HYDROCHLORIDE 60 MG: 60 CAPSULE, DELAYED RELEASE ORAL at 21:01

## 2025-01-07 RX ADMIN — PIPERACILLIN AND TAZOBACTAM 3375 MG: 3; .375 INJECTION, POWDER, LYOPHILIZED, FOR SOLUTION INTRAVENOUS at 08:39

## 2025-01-07 RX ADMIN — SODIUM CHLORIDE, PRESERVATIVE FREE 10 ML: 5 INJECTION INTRAVENOUS at 22:29

## 2025-01-07 RX ADMIN — KETOROLAC TROMETHAMINE 30 MG: 30 INJECTION, SOLUTION INTRAMUSCULAR at 02:44

## 2025-01-07 RX ADMIN — OXYCODONE 10 MG: 5 TABLET ORAL at 11:01

## 2025-01-07 RX ADMIN — METHOCARBAMOL 750 MG: 100 INJECTION, SOLUTION INTRAMUSCULAR; INTRAVENOUS at 14:01

## 2025-01-07 RX ADMIN — MORPHINE SULFATE 4 MG: 4 INJECTION, SOLUTION INTRAMUSCULAR; INTRAVENOUS at 13:59

## 2025-01-07 RX ADMIN — MONTELUKAST 10 MG: 10 TABLET, FILM COATED ORAL at 21:00

## 2025-01-07 RX ADMIN — FAMOTIDINE 20 MG: 20 TABLET, FILM COATED ORAL at 21:01

## 2025-01-07 RX ADMIN — MICONAZOLE NITRATE: 20 POWDER TOPICAL at 00:15

## 2025-01-07 RX ADMIN — SODIUM CHLORIDE, PRESERVATIVE FREE 20 MG: 5 INJECTION INTRAVENOUS at 09:52

## 2025-01-07 RX ADMIN — INSULIN LISPRO 1 UNITS: 100 INJECTION, SOLUTION INTRAVENOUS; SUBCUTANEOUS at 21:04

## 2025-01-07 RX ADMIN — METHOCARBAMOL 750 MG: 100 INJECTION, SOLUTION INTRAMUSCULAR; INTRAVENOUS at 02:43

## 2025-01-07 RX ADMIN — ACETAMINOPHEN 650 MG: 325 TABLET ORAL at 02:44

## 2025-01-07 RX ADMIN — ACETAMINOPHEN 650 MG: 325 TABLET ORAL at 08:39

## 2025-01-07 RX ADMIN — MICONAZOLE NITRATE: 20 POWDER TOPICAL at 10:00

## 2025-01-07 RX ADMIN — PIPERACILLIN AND TAZOBACTAM 3375 MG: 3; .375 INJECTION, POWDER, LYOPHILIZED, FOR SOLUTION INTRAVENOUS at 17:00

## 2025-01-07 RX ADMIN — LORAZEPAM 0.5 MG: 0.5 TABLET ORAL at 16:14

## 2025-01-07 RX ADMIN — ENOXAPARIN SODIUM 40 MG: 100 INJECTION SUBCUTANEOUS at 09:54

## 2025-01-07 RX ADMIN — ACETAMINOPHEN 650 MG: 325 TABLET ORAL at 21:01

## 2025-01-07 RX ADMIN — ONDANSETRON 4 MG: 2 INJECTION, SOLUTION INTRAMUSCULAR; INTRAVENOUS at 05:28

## 2025-01-07 RX ADMIN — FLUCONAZOLE 400 MG: 2 INJECTION, SOLUTION INTRAVENOUS at 14:03

## 2025-01-07 RX ADMIN — METHOCARBAMOL 750 MG: 100 INJECTION, SOLUTION INTRAMUSCULAR; INTRAVENOUS at 21:00

## 2025-01-07 RX ADMIN — MORPHINE SULFATE 2 MG: 2 INJECTION, SOLUTION INTRAMUSCULAR; INTRAVENOUS at 22:29

## 2025-01-07 RX ADMIN — ACETAMINOPHEN 650 MG: 325 TABLET ORAL at 14:13

## 2025-01-07 RX ADMIN — PIPERACILLIN AND TAZOBACTAM 3375 MG: 3; .375 INJECTION, POWDER, LYOPHILIZED, FOR SOLUTION INTRAVENOUS at 00:12

## 2025-01-07 ASSESSMENT — PAIN SCALES - GENERAL
PAINLEVEL_OUTOF10: 10
PAINLEVEL_OUTOF10: 2
PAINLEVEL_OUTOF10: 5
PAINLEVEL_OUTOF10: 2
PAINLEVEL_OUTOF10: 10
PAINLEVEL_OUTOF10: 10
PAINLEVEL_OUTOF10: 2
PAINLEVEL_OUTOF10: 3
PAINLEVEL_OUTOF10: 0
PAINLEVEL_OUTOF10: 4
PAINLEVEL_OUTOF10: 10
PAINLEVEL_OUTOF10: 6
PAINLEVEL_OUTOF10: 1
PAINLEVEL_OUTOF10: 5
PAINLEVEL_OUTOF10: 10
PAINLEVEL_OUTOF10: 1
PAINLEVEL_OUTOF10: 7
PAINLEVEL_OUTOF10: 10
PAINLEVEL_OUTOF10: 2
PAINLEVEL_OUTOF10: 1

## 2025-01-07 ASSESSMENT — PAIN - FUNCTIONAL ASSESSMENT

## 2025-01-07 ASSESSMENT — PAIN DESCRIPTION - LOCATION
LOCATION: ABDOMEN

## 2025-01-07 ASSESSMENT — PAIN DESCRIPTION - DESCRIPTORS
DESCRIPTORS: SHARP;STABBING
DESCRIPTORS: ACHING;DULL
DESCRIPTORS: ACHING
DESCRIPTORS: ACHING;CRAMPING;DISCOMFORT;SHARP
DESCRIPTORS: SHARP;SHOOTING
DESCRIPTORS: ACHING;DULL
DESCRIPTORS: ACHING;DULL
DESCRIPTORS: ACHING;CRAMPING;DISCOMFORT;SHARP
DESCRIPTORS: ACHING;SORE;TENDER
DESCRIPTORS: ACHING;CRAMPING;DISCOMFORT;SHARP

## 2025-01-07 ASSESSMENT — PAIN DESCRIPTION - ORIENTATION
ORIENTATION: RIGHT;LEFT
ORIENTATION: RIGHT;LEFT
ORIENTATION: MID
ORIENTATION: RIGHT;LEFT
ORIENTATION: LEFT

## 2025-01-07 ASSESSMENT — PAIN DESCRIPTION - ONSET: ONSET: ON-GOING

## 2025-01-07 ASSESSMENT — PAIN SCALES - WONG BAKER
WONGBAKER_NUMERICALRESPONSE: NO HURT

## 2025-01-07 ASSESSMENT — PAIN DESCRIPTION - PAIN TYPE: TYPE: SURGICAL PAIN

## 2025-01-07 ASSESSMENT — PAIN DESCRIPTION - FREQUENCY: FREQUENCY: CONTINUOUS

## 2025-01-07 NOTE — CONSULTS
CONSULTATION NOTE :ID     Patient - Velma Mazariegos,  Age - 59 y.o.    - 1965      Room Number - 0314/0314-01   N -  [id number]   [facility] # - [id number]  Date of Admission -  2025  7:13 AM  Patient's PCP: Niall Oconnor MD     Requesting Physician: Juan David Carranza MD    REASON FOR CONSULTATION   atbx  HISTORY OF PRESENT ILLNESS   This is a very pleasant 59 y.o. female who was admitted on 2025   to the hospital with a chief complaints of No chief complaint on file.  Pt is known to Dr Schulz followed for Diverticular abscess s/p percutaneous drainage    zosyn  3.375 grams IV q 8 hrs for 2 weeks last seen 2024    ID was consulted on 25  Pt presented with  Complicated diverticular disease with recurrent complicated diverticulitis. Colocutaneous fistula  On 2025  Laparoscopic robotic-assisted low anterior resection with low pelvic anastomosis.  Complete mobilization of splenic flexure, open drainage of pelvic abscess  On  : Colon anastomotic leak with feculent peritonitis intra-abdominal abscess  S/p DIAGNOSTIC LAPAROSCOPY CONVERTED TO EXPLORATORY LAPAROTOMY, PARTIAL COLON RESECTION, DRAINAGE OF PELVIC ABSCESS, FLEXIBLE SIGMOIDOSCOPY, END COLOSTOMY, RETENTION SUTURES    H/o MRSA neg    fluid cx GPC fungal       Current antibiotics  miconazole (MICOTIN) 2 % powder, BID  piperacillin-tazobactam (ZOSYN) 3,375 mg in sodium chloride 0.9 % 50 mL IVPB ([id number]), Q8H     Has wound right buttock   H/O PSORIASIS    PAST MEDICAL AND SURGICAL HISTORY     Past Medical History:   Diagnosis Date    Arthritis     Asthma     Diabetes mellitus (HCC)     Sleep apnea     cpap   #10    Thyroid disease        Past Surgical History:   Procedure Laterality Date    ANKLE SURGERY Left     screws in ankle    BLADDER SUSPENSION       SECTION      x3    COLECTOMY N/A 2025    DIAGNOSTIC LAPAROSCOPY CONVERTED TO EXPLORATORY LAPAROTOMY, PARTIAL COLON RESECTION, 
vomiting, diarrhea, or constipation.  Denies any abdominal pain.  Denies change in bowel habits or stools.  Reports postoperative pain as expected    Genito-Urinary:    Voiding with Strickland catheter    Musculoskeletal:   Denies joint pain, joint stiffness, joint swelling or muscle pain    Neurology:    Denies any headache or focal neurological deficits. No weakness or paresthesia.    Derm:    Denies any rashes, ulcers, or excoriations.  Denies bruising.      Extremities:   Denies any lower extremity swelling or edema.      PHYSICAL EXAM:  VITALS:  Vitals:    01/02/25 1445   BP: 112/77   Pulse: 95   Resp: 18   Temp: 98.1 °F (36.7 °C)   SpO2: 96%         CONSTITUTIONAL:    Awake, alert, cooperative, no apparent distress, and appears stated age    EYES:    PERRL, EOMI, sclera clear without icterus, conjunctiva normal    ENT:    Normocephalic, atraumatic, sinuses nontender on palpation. External ears without lesions. Oral pharynx with moist mucus membranes.  Tonsils without erythema or exudates.    NECK:    Supple, symmetrical, trachea midline, no adenopathy, thyroid symmetric, not enlarged and no tenderness, skin normal, no bruits, no JVD    HEMATOLOGIC/LYMPHATICS:    No cervical lymphadenopathy and no supraclavicular lymphadenopathy    LUNGS:    Symmetric. No increased work of breathing, good air exchange, clear to auscultation bilaterally, no wheezes, rhonchi, or rales,     CARDIOVASCULAR:    Normal apical impulse, regular rate and rhythm, normal S1 and S2, no S3 or S4, and no murmur noted    ABDOMEN:    Normal contour, normal bowel sounds, soft, non-distended, non-tender, no masses palpated, no hepatosplenomegaly, laparoscopic sites well-approximated glue dry and intact, postoperative pain as expected    MUSCULOSKELETAL:    There is no redness, warmth, or swelling of the joints.  Full range of motion noted.  Motor strength is 5 out of 5 all extremities bilaterally.  Tone is normal.    NEUROLOGIC:    Awake, alert,

## 2025-01-07 NOTE — FLOWSHEET NOTE
Inpatient Wound Care    Admit Date: 1/2/2025  7:13 AM    Reason for consult:  abdomen    Significant history:    Past Medical History:   Diagnosis Date    Arthritis     Asthma     Diabetes mellitus (HCC)     Sleep apnea     cpap   #10    Thyroid disease        Findings:     01/07/25 1528   Wound 10/24/24 Buttocks Right #1  right buttock   Date First Assessed/Time First Assessed: 10/24/24 1324   Present on Original Admission: Yes  Wound Approximate Age at First Assessment (Weeks): 8 weeks  Primary Wound Type: Surgical Type  Location: Buttocks  Wound Location Orientation: Right  Wound De...   Wound Image    Wound Cleansed Cleansed with saline   Wound Length (cm) 1.2 cm   Wound Width (cm) 1 cm   Wound Surface Area (cm^2) 1.2 cm^2   Change in Wound Size % (l*w) -140   Drainage Amount None (dry)   Odor None   Wound 01/07/25 Abdomen Mid   Date First Assessed/Time First Assessed: 01/07/25 1529   Primary Wound Type: Surgical Type  Location: Abdomen  Wound Location Orientation: Mid   Wound Image    Wound Cleansed Cleansed with saline   Wound Length (cm) 25 cm   Wound Width (cm) 1.8 cm   Wound Depth (cm) 4 cm   Wound Surface Area (cm^2) 45 cm^2   Wound Volume (cm^3) 180 cm^3   Wound Assessment Pink/red   Drainage Amount Small (< 25%)   Drainage Description Serosanguinous   Odor None       **Informed Consent**    The patient has given verbal consent to have photos taken of abdomen and inserted into their chart as part of their permanent medical record for purposes of documentation, treatment management and/or medical review.   All Images taken on 1/7/25 of patient name: Velma Albrechtrbs were transmitted and stored on secured Epic  Site located within Media Folder Tab by a registered Epic-[employer] Mobile Application Device.       Impression:  surgical wound  Retention sutures are inplace    Interventions in place:  moist packing    Plan:  Cont moist packing gauze daily  Pt lives alone   Pt will need homecare          Kaylynn

## 2025-01-07 NOTE — CARE COORDINATION
CM note: Pt has emergency surgery yesterday and has a new ostomy. She is refusing [facility] home care as she has had them in past. She is requesting [facility] home care. [facility] unable to accept any medicaid at this time. Pt states that she will most likely not be discharged until Monday per the dr. Multiple referrals out to see which [facility] provider can service a [employer] patient. Awaiting return calls. Will NEED [facility] orders.

## 2025-01-08 LAB
ALBUMIN SERPL-MCNC: 2.3 G/DL (ref 3.5–5.2)
ALP SERPL-CCNC: 89 U/L (ref 35–104)
ALT SERPL-CCNC: 17 U/L (ref 0–32)
ANION GAP SERPL CALCULATED.3IONS-SCNC: 9 MMOL/L (ref 7–16)
AST SERPL-CCNC: 9 U/L (ref 0–31)
BASOPHILS # BLD: 0.07 K/UL (ref 0–0.2)
BASOPHILS NFR BLD: 1 % (ref 0–2)
BILIRUB SERPL-MCNC: 0.2 MG/DL (ref 0–1.2)
BUN SERPL-MCNC: 26 MG/DL (ref 6–23)
CALCIUM SERPL-MCNC: 9.2 MG/DL (ref 8.6–10.2)
CHLORIDE SERPL-SCNC: 105 MMOL/L (ref 98–107)
CO2 SERPL-SCNC: 23 MMOL/L (ref 22–29)
CREAT SERPL-MCNC: 1 MG/DL (ref 0.5–1)
EOSINOPHIL # BLD: 0.07 K/UL (ref 0.05–0.5)
EOSINOPHILS RELATIVE PERCENT: 1 % (ref 0–6)
ERYTHROCYTE [DISTWIDTH] IN BLOOD BY AUTOMATED COUNT: 17.1 % (ref 11.5–15)
GFR, ESTIMATED: 68 ML/MIN/1.73M2
GLUCOSE BLD-MCNC: 140 MG/DL (ref 74–99)
GLUCOSE BLD-MCNC: 142 MG/DL (ref 74–99)
GLUCOSE BLD-MCNC: 167 MG/DL (ref 74–99)
GLUCOSE BLD-MCNC: 205 MG/DL (ref 74–99)
GLUCOSE SERPL-MCNC: 143 MG/DL (ref 74–99)
HCT VFR BLD AUTO: 31.7 % (ref 34–48)
HGB BLD-MCNC: 9.9 G/DL (ref 11.5–15.5)
LYMPHOCYTES NFR BLD: 0.45 K/UL (ref 1.5–4)
LYMPHOCYTES RELATIVE PERCENT: 5 % (ref 20–42)
MAGNESIUM SERPL-MCNC: 2.4 MG/DL (ref 1.6–2.6)
MCH RBC QN AUTO: 28.5 PG (ref 26–35)
MCHC RBC AUTO-ENTMCNC: 31.2 G/DL (ref 32–34.5)
MCV RBC AUTO: 91.4 FL (ref 80–99.9)
MONOCYTES NFR BLD: 0.37 K/UL (ref 0.1–0.95)
MONOCYTES NFR BLD: 4 % (ref 2–12)
NEUTROPHILS NFR BLD: 89 % (ref 43–80)
NEUTS SEG NFR BLD: 7.63 K/UL (ref 1.8–7.3)
NUCLEATED RED BLOOD CELLS: 3 PER 100 WBC
PHOSPHATE SERPL-MCNC: 3.8 MG/DL (ref 2.5–4.5)
PLATELET # BLD AUTO: 468 K/UL (ref 130–450)
PMV BLD AUTO: 9.1 FL (ref 7–12)
POTASSIUM SERPL-SCNC: 4.2 MMOL/L (ref 3.5–5)
PROT SERPL-MCNC: 5.8 G/DL (ref 6.4–8.3)
RBC # BLD AUTO: 3.47 M/UL (ref 3.5–5.5)
RBC # BLD: ABNORMAL 10*6/UL
SODIUM SERPL-SCNC: 137 MMOL/L (ref 132–146)
WBC OTHER # BLD: 8.6 K/UL (ref 4.5–11.5)

## 2025-01-08 PROCEDURE — 2580000003 HC RX 258: Performed by: SURGERY

## 2025-01-08 PROCEDURE — 1200000000 HC SEMI PRIVATE

## 2025-01-08 PROCEDURE — 6360000002 HC RX W HCPCS: Performed by: CLINICAL NURSE SPECIALIST

## 2025-01-08 PROCEDURE — 6360000002 HC RX W HCPCS: Performed by: SPECIALIST

## 2025-01-08 PROCEDURE — 82962 GLUCOSE BLOOD TEST: CPT

## 2025-01-08 PROCEDURE — 6360000002 HC RX W HCPCS: Performed by: SURGERY

## 2025-01-08 PROCEDURE — 36415 COLL VENOUS BLD VENIPUNCTURE: CPT

## 2025-01-08 PROCEDURE — 6370000000 HC RX 637 (ALT 250 FOR IP): Performed by: INTERNAL MEDICINE

## 2025-01-08 PROCEDURE — 2580000003 HC RX 258: Performed by: CLINICAL NURSE SPECIALIST

## 2025-01-08 PROCEDURE — 85025 COMPLETE CBC W/AUTO DIFF WBC: CPT

## 2025-01-08 PROCEDURE — 83735 ASSAY OF MAGNESIUM: CPT

## 2025-01-08 PROCEDURE — 2500000003 HC RX 250 WO HCPCS: Performed by: SURGERY

## 2025-01-08 PROCEDURE — 84100 ASSAY OF PHOSPHORUS: CPT

## 2025-01-08 PROCEDURE — 6370000000 HC RX 637 (ALT 250 FOR IP): Performed by: SURGERY

## 2025-01-08 PROCEDURE — 80053 COMPREHEN METABOLIC PANEL: CPT

## 2025-01-08 PROCEDURE — 94660 CPAP INITIATION&MGMT: CPT

## 2025-01-08 RX ADMIN — MICONAZOLE NITRATE: 20 POWDER TOPICAL at 22:43

## 2025-01-08 RX ADMIN — SODIUM CHLORIDE, PRESERVATIVE FREE 10 ML: 5 INJECTION INTRAVENOUS at 10:11

## 2025-01-08 RX ADMIN — LEVOTHYROXINE SODIUM 88 MCG: 0.09 TABLET ORAL at 06:17

## 2025-01-08 RX ADMIN — PIPERACILLIN AND TAZOBACTAM 3375 MG: 3; .375 INJECTION, POWDER, LYOPHILIZED, FOR SOLUTION INTRAVENOUS at 10:13

## 2025-01-08 RX ADMIN — MONTELUKAST 10 MG: 10 TABLET, FILM COATED ORAL at 22:37

## 2025-01-08 RX ADMIN — MICONAZOLE NITRATE: 20 POWDER TOPICAL at 10:10

## 2025-01-08 RX ADMIN — METHOCARBAMOL 750 MG: 100 INJECTION, SOLUTION INTRAMUSCULAR; INTRAVENOUS at 10:08

## 2025-01-08 RX ADMIN — OXYCODONE 10 MG: 5 TABLET ORAL at 10:08

## 2025-01-08 RX ADMIN — DULOXETINE HYDROCHLORIDE 60 MG: 60 CAPSULE, DELAYED RELEASE ORAL at 22:38

## 2025-01-08 RX ADMIN — FAMOTIDINE 20 MG: 20 TABLET, FILM COATED ORAL at 10:08

## 2025-01-08 RX ADMIN — ACETAMINOPHEN 650 MG: 325 TABLET ORAL at 22:37

## 2025-01-08 RX ADMIN — PIPERACILLIN AND TAZOBACTAM 3375 MG: 3; .375 INJECTION, POWDER, LYOPHILIZED, FOR SOLUTION INTRAVENOUS at 17:53

## 2025-01-08 RX ADMIN — ENOXAPARIN SODIUM 40 MG: 100 INJECTION SUBCUTANEOUS at 10:08

## 2025-01-08 RX ADMIN — METHOCARBAMOL 750 MG: 100 INJECTION, SOLUTION INTRAMUSCULAR; INTRAVENOUS at 03:48

## 2025-01-08 RX ADMIN — SODIUM CHLORIDE, PRESERVATIVE FREE 10 ML: 5 INJECTION INTRAVENOUS at 22:53

## 2025-01-08 RX ADMIN — SODIUM CHLORIDE 400 MG: 9 INJECTION INTRAMUSCULAR; INTRAVENOUS; SUBCUTANEOUS at 15:03

## 2025-01-08 RX ADMIN — LORAZEPAM 0.5 MG: 0.5 TABLET ORAL at 22:42

## 2025-01-08 RX ADMIN — ACETAMINOPHEN 650 MG: 325 TABLET ORAL at 15:02

## 2025-01-08 RX ADMIN — BISACODYL 5 MG: 5 TABLET, COATED ORAL at 10:08

## 2025-01-08 RX ADMIN — OXYCODONE 10 MG: 5 TABLET ORAL at 03:48

## 2025-01-08 RX ADMIN — SODIUM CHLORIDE, PRESERVATIVE FREE 20 MG: 5 INJECTION INTRAVENOUS at 22:46

## 2025-01-08 RX ADMIN — INSULIN LISPRO 2 UNITS: 100 INJECTION, SOLUTION INTRAVENOUS; SUBCUTANEOUS at 22:53

## 2025-01-08 RX ADMIN — FLUCONAZOLE 400 MG: 2 INJECTION, SOLUTION INTRAVENOUS at 15:04

## 2025-01-08 RX ADMIN — MORPHINE SULFATE 4 MG: 4 INJECTION, SOLUTION INTRAMUSCULAR; INTRAVENOUS at 23:50

## 2025-01-08 RX ADMIN — PIPERACILLIN AND TAZOBACTAM 3375 MG: 3; .375 INJECTION, POWDER, LYOPHILIZED, FOR SOLUTION INTRAVENOUS at 01:02

## 2025-01-08 RX ADMIN — ACETAMINOPHEN 650 MG: 325 TABLET ORAL at 10:07

## 2025-01-08 RX ADMIN — OXYCODONE 10 MG: 5 TABLET ORAL at 22:52

## 2025-01-08 RX ADMIN — OXYCODONE 10 MG: 5 TABLET ORAL at 15:14

## 2025-01-08 ASSESSMENT — PAIN SCALES - GENERAL
PAINLEVEL_OUTOF10: 7
PAINLEVEL_OUTOF10: 9
PAINLEVEL_OUTOF10: 9
PAINLEVEL_OUTOF10: 10
PAINLEVEL_OUTOF10: 9
PAINLEVEL_OUTOF10: 10
PAINLEVEL_OUTOF10: 5
PAINLEVEL_OUTOF10: 9

## 2025-01-08 ASSESSMENT — PAIN DESCRIPTION - LOCATION
LOCATION: ABDOMEN

## 2025-01-08 ASSESSMENT — PAIN DESCRIPTION - DESCRIPTORS
DESCRIPTORS: ACHING;DISCOMFORT;SORE
DESCRIPTORS: ACHING;SORE;TENDER
DESCRIPTORS: ACHING;DISCOMFORT;GNAWING
DESCRIPTORS: STABBING
DESCRIPTORS: STABBING

## 2025-01-08 ASSESSMENT — PAIN DESCRIPTION - PAIN TYPE
TYPE: SURGICAL PAIN
TYPE: SURGICAL PAIN

## 2025-01-08 ASSESSMENT — PAIN - FUNCTIONAL ASSESSMENT: PAIN_FUNCTIONAL_ASSESSMENT: PREVENTS OR INTERFERES SOME ACTIVE ACTIVITIES AND ADLS

## 2025-01-08 ASSESSMENT — PAIN DESCRIPTION - ORIENTATION: ORIENTATION: MID

## 2025-01-08 ASSESSMENT — PAIN DESCRIPTION - ONSET: ONSET: ON-GOING

## 2025-01-08 ASSESSMENT — PAIN DESCRIPTION - FREQUENCY: FREQUENCY: CONTINUOUS

## 2025-01-08 NOTE — CARE COORDINATION
CM note: Pt has been declined by [facility], [facility], [facility], [facility] and [facility].  Pt was accepted by [facility] for 1/10 however she is refusing them as she has had them in the past.  Referrals sent to [facility] and [facility] today.

## 2025-01-08 NOTE — CARE COORDINATION
CM note: Home health care list has been exhausted and no other [facility] provider could accept her except for [facility].  Pt initially did not want [facility] again d/t issues in September but she is reluctantly in agreement as she does not want to go to [facility] or SNF.  Pts cousin was present and patient may be discharging to her home, however cousins children are sick.  Pt to provide cousin's address if her final destination is the cousins home at discharge.  This will then be relayed to [facility].  Pt states that ostomy is still not producing stool, only air at times.  [facility] has patient on schedule for 1/12 SOC as we await return of bowel function and final antibiotics.  Pt states that cousin has helped with her wounds in the past and patient has done IV antibiotics at home before.  IF patient discharges on IV antibiotics, will NEED line placed, a signed paper script and patient will need to signed pharmacy consent.

## 2025-01-09 LAB
ALBUMIN SERPL-MCNC: 2.4 G/DL (ref 3.5–5.2)
ALP SERPL-CCNC: 92 U/L (ref 35–104)
ALT SERPL-CCNC: 15 U/L (ref 0–32)
ANION GAP SERPL CALCULATED.3IONS-SCNC: 11 MMOL/L (ref 7–16)
AST SERPL-CCNC: 12 U/L (ref 0–31)
BASOPHILS # BLD: 0.02 K/UL (ref 0–0.2)
BASOPHILS NFR BLD: 0 % (ref 0–2)
BILIRUB SERPL-MCNC: 0.2 MG/DL (ref 0–1.2)
BUN SERPL-MCNC: 21 MG/DL (ref 6–23)
CALCIUM SERPL-MCNC: 9 MG/DL (ref 8.6–10.2)
CHLORIDE SERPL-SCNC: 106 MMOL/L (ref 98–107)
CK SERPL-CCNC: 32 U/L (ref 20–180)
CO2 SERPL-SCNC: 23 MMOL/L (ref 22–29)
CREAT SERPL-MCNC: 0.7 MG/DL (ref 0.5–1)
EOSINOPHIL # BLD: 0.41 K/UL (ref 0.05–0.5)
EOSINOPHILS RELATIVE PERCENT: 5 % (ref 0–6)
ERYTHROCYTE [DISTWIDTH] IN BLOOD BY AUTOMATED COUNT: 17.3 % (ref 11.5–15)
GFR, ESTIMATED: >90 ML/MIN/1.73M2
GLUCOSE BLD-MCNC: 124 MG/DL (ref 74–99)
GLUCOSE BLD-MCNC: 127 MG/DL (ref 74–99)
GLUCOSE BLD-MCNC: 132 MG/DL (ref 74–99)
GLUCOSE BLD-MCNC: 137 MG/DL (ref 74–99)
GLUCOSE SERPL-MCNC: 144 MG/DL (ref 74–99)
HCT VFR BLD AUTO: 32.5 % (ref 34–48)
HGB BLD-MCNC: 9.8 G/DL (ref 11.5–15.5)
IMM GRANULOCYTES # BLD AUTO: 0.08 K/UL (ref 0–0.58)
IMM GRANULOCYTES NFR BLD: 1 % (ref 0–5)
LYMPHOCYTES NFR BLD: 1.03 K/UL (ref 1.5–4)
LYMPHOCYTES RELATIVE PERCENT: 12 % (ref 20–42)
MAGNESIUM SERPL-MCNC: 2 MG/DL (ref 1.6–2.6)
MCH RBC QN AUTO: 27.8 PG (ref 26–35)
MCHC RBC AUTO-ENTMCNC: 30.2 G/DL (ref 32–34.5)
MCV RBC AUTO: 92.3 FL (ref 80–99.9)
MICROORGANISM SPEC CULT: ABNORMAL
MICROORGANISM SPEC CULT: ABNORMAL
MICROORGANISM/AGENT SPEC: ABNORMAL
MONOCYTES NFR BLD: 0.74 K/UL (ref 0.1–0.95)
MONOCYTES NFR BLD: 9 % (ref 2–12)
NEUTROPHILS NFR BLD: 73 % (ref 43–80)
NEUTS SEG NFR BLD: 6.03 K/UL (ref 1.8–7.3)
PHOSPHATE SERPL-MCNC: 3 MG/DL (ref 2.5–4.5)
PLATELET # BLD AUTO: 468 K/UL (ref 130–450)
PMV BLD AUTO: 9.1 FL (ref 7–12)
POTASSIUM SERPL-SCNC: 3.9 MMOL/L (ref 3.5–5)
PROT SERPL-MCNC: 5.8 G/DL (ref 6.4–8.3)
RBC # BLD AUTO: 3.52 M/UL (ref 3.5–5.5)
SERVICE CMNT-IMP: ABNORMAL
SODIUM SERPL-SCNC: 140 MMOL/L (ref 132–146)
SPECIMEN DESCRIPTION: ABNORMAL
SURGICAL PATHOLOGY REPORT: NORMAL
WBC OTHER # BLD: 8.3 K/UL (ref 4.5–11.5)

## 2025-01-09 PROCEDURE — 6370000000 HC RX 637 (ALT 250 FOR IP): Performed by: SURGERY

## 2025-01-09 PROCEDURE — 2580000003 HC RX 258: Performed by: SPECIALIST

## 2025-01-09 PROCEDURE — 84100 ASSAY OF PHOSPHORUS: CPT

## 2025-01-09 PROCEDURE — C1751 CATH, INF, PER/CENT/MIDLINE: HCPCS

## 2025-01-09 PROCEDURE — 6360000002 HC RX W HCPCS: Performed by: SURGERY

## 2025-01-09 PROCEDURE — 80053 COMPREHEN METABOLIC PANEL: CPT

## 2025-01-09 PROCEDURE — 1200000000 HC SEMI PRIVATE

## 2025-01-09 PROCEDURE — 6360000002 HC RX W HCPCS: Performed by: SPECIALIST

## 2025-01-09 PROCEDURE — 36410 VNPNXR 3YR/> PHY/QHP DX/THER: CPT

## 2025-01-09 PROCEDURE — 05HC33Z INSERTION OF INFUSION DEVICE INTO LEFT BASILIC VEIN, PERCUTANEOUS APPROACH: ICD-10-PCS | Performed by: SURGERY

## 2025-01-09 PROCEDURE — 82550 ASSAY OF CK (CPK): CPT

## 2025-01-09 PROCEDURE — 76937 US GUIDE VASCULAR ACCESS: CPT

## 2025-01-09 PROCEDURE — 2500000003 HC RX 250 WO HCPCS: Performed by: SURGERY

## 2025-01-09 PROCEDURE — 82962 GLUCOSE BLOOD TEST: CPT

## 2025-01-09 PROCEDURE — 83735 ASSAY OF MAGNESIUM: CPT

## 2025-01-09 PROCEDURE — 36415 COLL VENOUS BLD VENIPUNCTURE: CPT

## 2025-01-09 PROCEDURE — 85025 COMPLETE CBC W/AUTO DIFF WBC: CPT

## 2025-01-09 PROCEDURE — 6370000000 HC RX 637 (ALT 250 FOR IP): Performed by: INTERNAL MEDICINE

## 2025-01-09 PROCEDURE — 94660 CPAP INITIATION&MGMT: CPT

## 2025-01-09 PROCEDURE — 2580000003 HC RX 258: Performed by: SURGERY

## 2025-01-09 PROCEDURE — 94640 AIRWAY INHALATION TREATMENT: CPT

## 2025-01-09 RX ORDER — LIDOCAINE HYDROCHLORIDE 10 MG/ML
50 INJECTION, SOLUTION INFILTRATION; PERINEURAL ONCE
Status: COMPLETED | OUTPATIENT
Start: 2025-01-09 | End: 2025-01-09

## 2025-01-09 RX ORDER — IPRATROPIUM BROMIDE AND ALBUTEROL SULFATE 2.5; .5 MG/3ML; MG/3ML
1 SOLUTION RESPIRATORY (INHALATION) ONCE
Status: COMPLETED | OUTPATIENT
Start: 2025-01-09 | End: 2025-01-09

## 2025-01-09 RX ORDER — SODIUM CHLORIDE 0.9 % (FLUSH) 0.9 %
5-40 SYRINGE (ML) INJECTION PRN
Status: DISCONTINUED | OUTPATIENT
Start: 2025-01-09 | End: 2025-01-14 | Stop reason: HOSPADM

## 2025-01-09 RX ORDER — SODIUM CHLORIDE 9 MG/ML
INJECTION, SOLUTION INTRAVENOUS PRN
Status: DISCONTINUED | OUTPATIENT
Start: 2025-01-09 | End: 2025-01-14 | Stop reason: HOSPADM

## 2025-01-09 RX ORDER — DOCUSATE SODIUM 100 MG/1
100 CAPSULE, LIQUID FILLED ORAL 2 TIMES DAILY
Qty: 30 CAPSULE | Refills: 0 | Status: ON HOLD | OUTPATIENT
Start: 2025-01-09 | End: 2025-01-24

## 2025-01-09 RX ORDER — SODIUM CHLORIDE 0.9 % (FLUSH) 0.9 %
5-40 SYRINGE (ML) INJECTION EVERY 12 HOURS SCHEDULED
Status: DISCONTINUED | OUTPATIENT
Start: 2025-01-09 | End: 2025-01-14 | Stop reason: HOSPADM

## 2025-01-09 RX ORDER — ONDANSETRON 4 MG/1
4 TABLET, ORALLY DISINTEGRATING ORAL EVERY 8 HOURS PRN
Qty: 20 TABLET | Refills: 1 | Status: ON HOLD | OUTPATIENT
Start: 2025-01-09

## 2025-01-09 RX ORDER — FLUCONAZOLE 200 MG/1
400 TABLET ORAL DAILY
Qty: 42 TABLET | Refills: 0 | Status: ON HOLD | OUTPATIENT
Start: 2025-01-09 | End: 2025-01-30

## 2025-01-09 RX ORDER — OXYCODONE AND ACETAMINOPHEN 5; 325 MG/1; MG/1
1 TABLET ORAL EVERY 6 HOURS PRN
Qty: 10 TABLET | Refills: 0 | Status: SHIPPED | OUTPATIENT
Start: 2025-01-09 | End: 2025-01-12

## 2025-01-09 RX ORDER — IBUPROFEN 800 MG/1
800 TABLET, FILM COATED ORAL EVERY 8 HOURS PRN
Qty: 30 TABLET | Refills: 0 | Status: ON HOLD | OUTPATIENT
Start: 2025-01-09 | End: 2025-01-19

## 2025-01-09 RX ORDER — METHOCARBAMOL 750 MG/1
750 TABLET, FILM COATED ORAL 4 TIMES DAILY
Qty: 40 TABLET | Refills: 0 | Status: ON HOLD | OUTPATIENT
Start: 2025-01-09 | End: 2025-01-19

## 2025-01-09 RX ADMIN — ACETAMINOPHEN 650 MG: 325 TABLET ORAL at 08:06

## 2025-01-09 RX ADMIN — OXYCODONE 10 MG: 5 TABLET ORAL at 09:07

## 2025-01-09 RX ADMIN — ACETAMINOPHEN 650 MG: 325 TABLET ORAL at 14:17

## 2025-01-09 RX ADMIN — BISACODYL 5 MG: 5 TABLET, COATED ORAL at 08:06

## 2025-01-09 RX ADMIN — MORPHINE SULFATE 4 MG: 4 INJECTION, SOLUTION INTRAMUSCULAR; INTRAVENOUS at 22:54

## 2025-01-09 RX ADMIN — FLUCONAZOLE 400 MG: 2 INJECTION, SOLUTION INTRAVENOUS at 14:15

## 2025-01-09 RX ADMIN — MONTELUKAST 10 MG: 10 TABLET, FILM COATED ORAL at 21:52

## 2025-01-09 RX ADMIN — ACETAMINOPHEN 650 MG: 325 TABLET ORAL at 21:51

## 2025-01-09 RX ADMIN — IPRATROPIUM BROMIDE AND ALBUTEROL SULFATE 1 DOSE: .5; 2.5 SOLUTION RESPIRATORY (INHALATION) at 03:04

## 2025-01-09 RX ADMIN — DULOXETINE HYDROCHLORIDE 60 MG: 60 CAPSULE, DELAYED RELEASE ORAL at 21:52

## 2025-01-09 RX ADMIN — MICONAZOLE NITRATE: 20 POWDER TOPICAL at 21:53

## 2025-01-09 RX ADMIN — MORPHINE SULFATE 4 MG: 4 INJECTION, SOLUTION INTRAMUSCULAR; INTRAVENOUS at 11:14

## 2025-01-09 RX ADMIN — OXYCODONE 10 MG: 5 TABLET ORAL at 14:17

## 2025-01-09 RX ADMIN — FAMOTIDINE 20 MG: 20 TABLET, FILM COATED ORAL at 21:52

## 2025-01-09 RX ADMIN — MICONAZOLE NITRATE: 20 POWDER TOPICAL at 12:00

## 2025-01-09 RX ADMIN — SODIUM CHLORIDE 500 MG: 9 INJECTION INTRAMUSCULAR; INTRAVENOUS; SUBCUTANEOUS at 16:18

## 2025-01-09 RX ADMIN — SODIUM CHLORIDE, PRESERVATIVE FREE 10 ML: 5 INJECTION INTRAVENOUS at 08:09

## 2025-01-09 RX ADMIN — MORPHINE SULFATE 4 MG: 4 INJECTION, SOLUTION INTRAMUSCULAR; INTRAVENOUS at 07:45

## 2025-01-09 RX ADMIN — PIPERACILLIN AND TAZOBACTAM 3375 MG: 3; .375 INJECTION, POWDER, LYOPHILIZED, FOR SOLUTION INTRAVENOUS at 18:24

## 2025-01-09 RX ADMIN — PIPERACILLIN AND TAZOBACTAM 3375 MG: 3; .375 INJECTION, POWDER, LYOPHILIZED, FOR SOLUTION INTRAVENOUS at 09:11

## 2025-01-09 RX ADMIN — MORPHINE SULFATE 4 MG: 4 INJECTION, SOLUTION INTRAMUSCULAR; INTRAVENOUS at 01:42

## 2025-01-09 RX ADMIN — ENOXAPARIN SODIUM 40 MG: 100 INJECTION SUBCUTANEOUS at 09:20

## 2025-01-09 RX ADMIN — LEVOTHYROXINE SODIUM 88 MCG: 0.09 TABLET ORAL at 06:16

## 2025-01-09 RX ADMIN — ACETAMINOPHEN 650 MG: 325 TABLET ORAL at 02:00

## 2025-01-09 RX ADMIN — OXYCODONE 10 MG: 5 TABLET ORAL at 21:51

## 2025-01-09 RX ADMIN — FAMOTIDINE 20 MG: 20 TABLET, FILM COATED ORAL at 08:06

## 2025-01-09 RX ADMIN — PIPERACILLIN AND TAZOBACTAM 3375 MG: 3; .375 INJECTION, POWDER, LYOPHILIZED, FOR SOLUTION INTRAVENOUS at 00:45

## 2025-01-09 RX ADMIN — LIDOCAINE HYDROCHLORIDE 50 MG: 10 INJECTION, SOLUTION INFILTRATION; PERINEURAL at 13:43

## 2025-01-09 ASSESSMENT — PAIN DESCRIPTION - ORIENTATION
ORIENTATION: RIGHT;LEFT
ORIENTATION: RIGHT;LEFT;MID
ORIENTATION: RIGHT;LEFT
ORIENTATION: RIGHT;LEFT

## 2025-01-09 ASSESSMENT — PAIN DESCRIPTION - LOCATION
LOCATION: ABDOMEN

## 2025-01-09 ASSESSMENT — PAIN SCALES - GENERAL
PAINLEVEL_OUTOF10: 10
PAINLEVEL_OUTOF10: 10
PAINLEVEL_OUTOF10: 8
PAINLEVEL_OUTOF10: 10
PAINLEVEL_OUTOF10: 10
PAINLEVEL_OUTOF10: 0
PAINLEVEL_OUTOF10: 5
PAINLEVEL_OUTOF10: 7
PAINLEVEL_OUTOF10: 10
PAINLEVEL_OUTOF10: 0
PAINLEVEL_OUTOF10: 4
PAINLEVEL_OUTOF10: 5
PAINLEVEL_OUTOF10: 8
PAINLEVEL_OUTOF10: 8
PAINLEVEL_OUTOF10: 10
PAINLEVEL_OUTOF10: 8

## 2025-01-09 ASSESSMENT — PAIN DESCRIPTION - DESCRIPTORS
DESCRIPTORS: ACHING;CRAMPING
DESCRIPTORS: ACHING;CRAMPING
DESCRIPTORS: ACHING;PRESSURE;SORE
DESCRIPTORS: ACHING;SHARP;SORE
DESCRIPTORS: SHARP

## 2025-01-09 NOTE — CARE COORDINATION
CM note: Received script for daily dapto from ID, still unsure about Zosyn.  CM will get home infusion consent signed and fax to [facility] pharmacy. Order for line written today. Pt is scheduled for start of care with [facility] on 1/12/25, can discharge on 1/11/25 after receiving her antibiotics that day.  [facility] orders written today.

## 2025-01-09 NOTE — FLOWSHEET NOTE
Inpatient Wound Care    Admit Date: 1/2/2025  7:13 AM    Reason for consult:  abd wound    Significant history:    Past Medical History:   Diagnosis Date    Arthritis     Asthma     Diabetes mellitus (HCC)     Sleep apnea     cpap   #10    Thyroid disease            Findings:     01/09/25 1615   Wound 01/07/25 Abdomen Mid   Date First Assessed/Time First Assessed: 01/07/25 1529   Primary Wound Type: Surgical Type  Location: Abdomen  Wound Location Orientation: Mid   Wound Etiology Surgical   Wound Cleansed Cleansed with saline   Wound Length (cm) 25 cm   Wound Width (cm) 1.8 cm   Wound Depth (cm) 4 cm   Wound Surface Area (cm^2) 45 cm^2   Change in Wound Size % (l*w) 0   Wound Volume (cm^3) 180 cm^3   Wound Healing % 0   Wound Assessment Pink/red   Drainage Amount Moderate (25-50%)   Drainage Description Serosanguinous   Odor None   Laxmi-wound Assessment Intact         Impression:  surgical wound    Interventions in place:  moist packing    Plan:  Cont with packing daily  Inst pt re care  Discussed follow up       Kaylynn Mendes RN 1/9/2025 4:18 PM

## 2025-01-09 NOTE — PROCEDURES
PROCEDURE NOTE  Date: 1/9/2025   Name: Velma Mazariegos  YOB: 1965    Procedures        SL midline Placement 1/9/2025    Product number: [id number]   Lot Number: [id number]      Ultrasound: yes   , L Basilic\"      Upper Arm Circumference: 34cm    Size: 4.5FR    Exposed Length: 0CM    Internal Length: 15cm   Cut: 0CM   Vein Measurement: 0.60cm      Brisk blood return  Flushed well  Dressing applied  RN jay Velez RN  1/9/2025  1:44 PM

## 2025-01-09 NOTE — DISCHARGE INSTR - COC
Continuity of Care Form    Patient Name: Velma Mazariegos   :  1965  MRN:  [id number]    Admit date:  2025  Discharge date:  2024    Code Status Order: Full Code   Advance Directives:   Advance Care Flowsheet Documentation        Date/Time Healthcare Directive Type of Healthcare Directive Copy in Chart Healthcare Agent Appointed Healthcare Agent's Name Healthcare Agent's Phone Number    [phone] No, patient does not have an advance directive for healthcare treatment  --  --  --  --  --                     Admitting Physician:  Juan David Carranza MD  PCP: Niall Oconnor MD    Discharging Nurse: Lucero  Discharging Hospital Unit/Room#: 0314/0314-01  Discharging Unit Phone Number: [phone]    Emergency Contact:   Extended Emergency Contact Information  Primary Emergency Contact: AmbrociopatsyEverardo wayne  Home Phone: [phone]  Mobile Phone: [phone]  Relation: Child  Secondary Emergency Contact: Muriel Talbert  Home Phone: [phone]  Mobile Phone: [phone]  Relation: Child  Preferred language: English   needed? No    Past Surgical History:  Past Surgical History:   Procedure Laterality Date    ANKLE SURGERY Left     screws in ankle    BLADDER SUSPENSION       SECTION      x3    COLECTOMY N/A 2025    DIAGNOSTIC LAPAROSCOPY CONVERTED TO EXPLORATORY LAPAROTOMY, PARTIAL COLON RESECTION, DRAINAGE OF PELVIC ABSCESS, FLEXIBLE SIGMOIDOSCOPY, END COLOSTOMY, RETENTION SUTURES performed by Juan David Carranza MD at [facility] OR    COLONOSCOPY      SMALL INTESTINE SURGERY N/A 2025    BOWEL RESECTION SIGMOID LAPAROSCOPIC ROBOTIC XI performed by Juan David Carranza MD at [facility] OR       Immunization History:   Immunization History   Administered Date(s) Administered    Influenza, FLUARIX, FLULAVAL, FLUZONE (age 6 mo+) and AFLURIA, (age 3 y+), Quadv PF, 0.5mL 10/05/2017, 11/15/2018, 2019    Pneumococcal, PPSV23, PNEUMOVAX 23, (age 2y+), SC/IM, 0.5mL 2016       Active

## 2025-01-10 ENCOUNTER — HOSPITAL ENCOUNTER (OUTPATIENT)
Dept: WOUND CARE | Age: 60
Discharge: HOME | End: 2025-01-10
Attending: SURGERY

## 2025-01-10 LAB
ALBUMIN SERPL-MCNC: 2.6 G/DL (ref 3.5–5.2)
ALP SERPL-CCNC: 130 U/L (ref 35–104)
ALT SERPL-CCNC: 16 U/L (ref 0–32)
ANION GAP SERPL CALCULATED.3IONS-SCNC: 11 MMOL/L (ref 7–16)
AST SERPL-CCNC: 17 U/L (ref 0–31)
BASOPHILS # BLD: 0 K/UL (ref 0–0.2)
BASOPHILS NFR BLD: 0 % (ref 0–2)
BILIRUB SERPL-MCNC: 0.3 MG/DL (ref 0–1.2)
BUN SERPL-MCNC: 13 MG/DL (ref 6–23)
CALCIUM SERPL-MCNC: 8.8 MG/DL (ref 8.6–10.2)
CHLORIDE SERPL-SCNC: 106 MMOL/L (ref 98–107)
CO2 SERPL-SCNC: 25 MMOL/L (ref 22–29)
CREAT SERPL-MCNC: 0.6 MG/DL (ref 0.5–1)
EOSINOPHIL # BLD: 0.51 K/UL (ref 0.05–0.5)
EOSINOPHILS RELATIVE PERCENT: 4 % (ref 0–6)
ERYTHROCYTE [DISTWIDTH] IN BLOOD BY AUTOMATED COUNT: 17.2 % (ref 11.5–15)
GFR, ESTIMATED: >90 ML/MIN/1.73M2
GLUCOSE BLD-MCNC: 133 MG/DL (ref 74–99)
GLUCOSE BLD-MCNC: 138 MG/DL (ref 74–99)
GLUCOSE BLD-MCNC: 157 MG/DL (ref 74–99)
GLUCOSE BLD-MCNC: 207 MG/DL (ref 74–99)
GLUCOSE SERPL-MCNC: 121 MG/DL (ref 74–99)
HCT VFR BLD AUTO: 34.8 % (ref 34–48)
HGB BLD-MCNC: 10.5 G/DL (ref 11.5–15.5)
LYMPHOCYTES NFR BLD: 1.32 K/UL (ref 1.5–4)
LYMPHOCYTES RELATIVE PERCENT: 11 % (ref 20–42)
MAGNESIUM SERPL-MCNC: 2 MG/DL (ref 1.6–2.6)
MCH RBC QN AUTO: 28.4 PG (ref 26–35)
MCHC RBC AUTO-ENTMCNC: 30.2 G/DL (ref 32–34.5)
MCV RBC AUTO: 94.1 FL (ref 80–99.9)
MICROORGANISM SPEC CULT: NORMAL
MONOCYTES NFR BLD: 0.31 K/UL (ref 0.1–0.95)
MONOCYTES NFR BLD: 3 % (ref 2–12)
MYELOCYTES ABSOLUTE COUNT: 0.1 K/UL
MYELOCYTES: 1 %
NEUTROPHILS NFR BLD: 81 % (ref 43–80)
NEUTS SEG NFR BLD: 9.46 K/UL (ref 1.8–7.3)
PHOSPHATE SERPL-MCNC: 3.4 MG/DL (ref 2.5–4.5)
PLATELET # BLD AUTO: 537 K/UL (ref 130–450)
PMV BLD AUTO: 9.1 FL (ref 7–12)
POTASSIUM SERPL-SCNC: 4.1 MMOL/L (ref 3.5–5)
PROT SERPL-MCNC: 6 G/DL (ref 6.4–8.3)
RBC # BLD AUTO: 3.7 M/UL (ref 3.5–5.5)
RBC # BLD: ABNORMAL 10*6/UL
SERVICE CMNT-IMP: NORMAL
SODIUM SERPL-SCNC: 142 MMOL/L (ref 132–146)
SPECIMEN DESCRIPTION: NORMAL
WBC OTHER # BLD: 11.7 K/UL (ref 4.5–11.5)

## 2025-01-10 PROCEDURE — 6360000002 HC RX W HCPCS: Performed by: SURGERY

## 2025-01-10 PROCEDURE — 97530 THERAPEUTIC ACTIVITIES: CPT | Performed by: PHYSICAL THERAPIST

## 2025-01-10 PROCEDURE — 6360000002 HC RX W HCPCS

## 2025-01-10 PROCEDURE — 97161 PT EVAL LOW COMPLEX 20 MIN: CPT | Performed by: PHYSICAL THERAPIST

## 2025-01-10 PROCEDURE — 82962 GLUCOSE BLOOD TEST: CPT

## 2025-01-10 PROCEDURE — 84100 ASSAY OF PHOSPHORUS: CPT

## 2025-01-10 PROCEDURE — 80053 COMPREHEN METABOLIC PANEL: CPT

## 2025-01-10 PROCEDURE — 6370000000 HC RX 637 (ALT 250 FOR IP): Performed by: SURGERY

## 2025-01-10 PROCEDURE — 6370000000 HC RX 637 (ALT 250 FOR IP): Performed by: INTERNAL MEDICINE

## 2025-01-10 PROCEDURE — 6360000002 HC RX W HCPCS: Performed by: SPECIALIST

## 2025-01-10 PROCEDURE — 83735 ASSAY OF MAGNESIUM: CPT

## 2025-01-10 PROCEDURE — 2500000003 HC RX 250 WO HCPCS: Performed by: SPECIALIST

## 2025-01-10 PROCEDURE — 2500000003 HC RX 250 WO HCPCS: Performed by: SURGERY

## 2025-01-10 PROCEDURE — 2580000003 HC RX 258: Performed by: SPECIALIST

## 2025-01-10 PROCEDURE — 2580000003 HC RX 258: Performed by: SURGERY

## 2025-01-10 PROCEDURE — 36415 COLL VENOUS BLD VENIPUNCTURE: CPT

## 2025-01-10 PROCEDURE — 94660 CPAP INITIATION&MGMT: CPT

## 2025-01-10 PROCEDURE — 85025 COMPLETE CBC W/AUTO DIFF WBC: CPT

## 2025-01-10 PROCEDURE — 1200000000 HC SEMI PRIVATE

## 2025-01-10 RX ORDER — FUROSEMIDE 10 MG/ML
40 INJECTION INTRAMUSCULAR; INTRAVENOUS ONCE
Status: COMPLETED | OUTPATIENT
Start: 2025-01-10 | End: 2025-01-10

## 2025-01-10 RX ADMIN — MORPHINE SULFATE 4 MG: 4 INJECTION, SOLUTION INTRAMUSCULAR; INTRAVENOUS at 14:47

## 2025-01-10 RX ADMIN — FAMOTIDINE 20 MG: 20 TABLET, FILM COATED ORAL at 21:21

## 2025-01-10 RX ADMIN — OXYCODONE 10 MG: 5 TABLET ORAL at 17:38

## 2025-01-10 RX ADMIN — FLUCONAZOLE 400 MG: 2 INJECTION, SOLUTION INTRAVENOUS at 14:51

## 2025-01-10 RX ADMIN — ENOXAPARIN SODIUM 40 MG: 100 INJECTION SUBCUTANEOUS at 08:48

## 2025-01-10 RX ADMIN — MONTELUKAST 10 MG: 10 TABLET, FILM COATED ORAL at 21:21

## 2025-01-10 RX ADMIN — ACETAMINOPHEN 650 MG: 325 TABLET ORAL at 02:44

## 2025-01-10 RX ADMIN — FUROSEMIDE 40 MG: 10 INJECTION, SOLUTION INTRAMUSCULAR; INTRAVENOUS at 08:48

## 2025-01-10 RX ADMIN — SODIUM CHLORIDE, PRESERVATIVE FREE 10 ML: 5 INJECTION INTRAVENOUS at 21:43

## 2025-01-10 RX ADMIN — MORPHINE SULFATE 2 MG: 2 INJECTION, SOLUTION INTRAMUSCULAR; INTRAVENOUS at 21:37

## 2025-01-10 RX ADMIN — BISACODYL 5 MG: 5 TABLET, COATED ORAL at 08:47

## 2025-01-10 RX ADMIN — LORAZEPAM 0.5 MG: 0.5 TABLET ORAL at 08:47

## 2025-01-10 RX ADMIN — MICONAZOLE NITRATE: 20 POWDER TOPICAL at 08:49

## 2025-01-10 RX ADMIN — PIPERACILLIN AND TAZOBACTAM 3375 MG: 3; .375 INJECTION, POWDER, LYOPHILIZED, FOR SOLUTION INTRAVENOUS at 02:43

## 2025-01-10 RX ADMIN — PIPERACILLIN AND TAZOBACTAM 3375 MG: 3; .375 INJECTION, POWDER, LYOPHILIZED, FOR SOLUTION INTRAVENOUS at 08:58

## 2025-01-10 RX ADMIN — ACETAMINOPHEN 650 MG: 325 TABLET ORAL at 21:21

## 2025-01-10 RX ADMIN — SODIUM CHLORIDE, PRESERVATIVE FREE 10 ML: 5 INJECTION INTRAVENOUS at 08:48

## 2025-01-10 RX ADMIN — LEVOTHYROXINE SODIUM 88 MCG: 0.09 TABLET ORAL at 05:45

## 2025-01-10 RX ADMIN — DULOXETINE HYDROCHLORIDE 60 MG: 60 CAPSULE, DELAYED RELEASE ORAL at 21:21

## 2025-01-10 RX ADMIN — MICONAZOLE NITRATE: 20 POWDER TOPICAL at 21:26

## 2025-01-10 RX ADMIN — FAMOTIDINE 20 MG: 20 TABLET, FILM COATED ORAL at 08:48

## 2025-01-10 RX ADMIN — SODIUM CHLORIDE 500 MG: 9 INJECTION INTRAMUSCULAR; INTRAVENOUS; SUBCUTANEOUS at 17:39

## 2025-01-10 RX ADMIN — ACETAMINOPHEN 650 MG: 325 TABLET ORAL at 08:47

## 2025-01-10 RX ADMIN — OXYCODONE 10 MG: 5 TABLET ORAL at 05:45

## 2025-01-10 RX ADMIN — INSULIN LISPRO 2 UNITS: 100 INJECTION, SOLUTION INTRAVENOUS; SUBCUTANEOUS at 17:39

## 2025-01-10 RX ADMIN — ACETAMINOPHEN 650 MG: 325 TABLET ORAL at 14:47

## 2025-01-10 ASSESSMENT — PAIN SCALES - GENERAL
PAINLEVEL_OUTOF10: 0
PAINLEVEL_OUTOF10: 10
PAINLEVEL_OUTOF10: 9
PAINLEVEL_OUTOF10: 7
PAINLEVEL_OUTOF10: 5
PAINLEVEL_OUTOF10: 9
PAINLEVEL_OUTOF10: 9
PAINLEVEL_OUTOF10: 8
PAINLEVEL_OUTOF10: 0

## 2025-01-10 ASSESSMENT — PAIN DESCRIPTION - LOCATION
LOCATION: ABDOMEN

## 2025-01-10 ASSESSMENT — PAIN DESCRIPTION - DESCRIPTORS
DESCRIPTORS: ACHING;DISCOMFORT;SORE
DESCRIPTORS: ACHING;SORE;STABBING
DESCRIPTORS: ACHING;SORE;DULL

## 2025-01-10 ASSESSMENT — PAIN DESCRIPTION - PAIN TYPE: TYPE: SURGICAL PAIN

## 2025-01-10 ASSESSMENT — PAIN DESCRIPTION - ORIENTATION
ORIENTATION: RIGHT;LEFT
ORIENTATION: RIGHT;LEFT

## 2025-01-10 NOTE — CARE COORDINATION
CM note: Notified by ID NP that patient is now considering facility at discharge as she feels the care is too much for her to handle at home.  Spoke with the patient and she is unsure of where she would like to go.  Attempting to clarify with infectious disease what antibiotics the patient will need as this may affect her discharge options.  For any facility patient will NEED [facility] and [facility].  IF SNF patient will also NEED [facility].  Will discuss options with patient once antibiotic treatments are determined.          ADDENDUM:11:00 AM  Per ID NP, pt will most likely discharge on the IV dapto and oral antibiotics.  CM provided SNF list for patient to review and requested that she have 3 facilities chosen for CM to make referrals to.        ADDENDUM:2:52 PM  Follow up visit made to patient and she states at this time she is only interested in [facility].  CM checking with liaison on bed availability.

## 2025-01-10 NOTE — DISCHARGE SUMMARY
Physician Discharge Summary     Patient ID:  Velma Mazariegos  [id number]  59 y.o.  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)    Diverticulitis large intestine w/o perforation or abscess w/o bleeding       Discharge Diagnoses:   Patient Active Problem List   Diagnosis    Asthma exacerbation    Nicotine addiction    Left ankle sprain    Diverticulitis of colon    Abscess    Right lower quadrant abdominal pain    Colonic diverticular abscess    Decubitus ulcer of right buttock, stage 3 (HCC)    Diabetes mellitus type 2, noninsulin dependent (HCC)    Diverticulitis with perforation and abscess    Colocutaneous fistula    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)    Diverticulitis large intestine w/o perforation or abscess w/o bleeding       Admission Condition: good    Discharged Condition: good    Indication for Admission: perforated diverticulitis, colocutaneous fistula    Hospital Course: Velma Mazariegos is a 59 y.o. female admitted after lap robot LAR with takedown colocutaneous fistula. She was admitted postop and did well initially, dugan was removed. She was tolerating a diet until POD 4 she develop RLQ pain and repeat CT suggestive of worsening free fluid. Her wound began to drain and she was taken back for concern for anastomotic leak. She underwent exlap takedown of her low anastomosis and washout with

## 2025-01-11 LAB
ALBUMIN SERPL-MCNC: 2.5 G/DL (ref 3.5–5.2)
ALP SERPL-CCNC: 127 U/L (ref 35–104)
ALT SERPL-CCNC: 12 U/L (ref 0–32)
ANION GAP SERPL CALCULATED.3IONS-SCNC: 12 MMOL/L (ref 7–16)
AST SERPL-CCNC: 11 U/L (ref 0–31)
BASOPHILS # BLD: 0 K/UL (ref 0–0.2)
BASOPHILS NFR BLD: 0 % (ref 0–2)
BILIRUB SERPL-MCNC: 0.2 MG/DL (ref 0–1.2)
BUN SERPL-MCNC: 9 MG/DL (ref 6–23)
CALCIUM SERPL-MCNC: 8.9 MG/DL (ref 8.6–10.2)
CHLORIDE SERPL-SCNC: 100 MMOL/L (ref 98–107)
CO2 SERPL-SCNC: 27 MMOL/L (ref 22–29)
CREAT SERPL-MCNC: 0.5 MG/DL (ref 0.5–1)
EOSINOPHIL # BLD: 0.2 K/UL (ref 0.05–0.5)
EOSINOPHILS RELATIVE PERCENT: 2 % (ref 0–6)
ERYTHROCYTE [DISTWIDTH] IN BLOOD BY AUTOMATED COUNT: 17.2 % (ref 11.5–15)
GFR, ESTIMATED: >90 ML/MIN/1.73M2
GLUCOSE BLD-MCNC: 125 MG/DL (ref 74–99)
GLUCOSE BLD-MCNC: 155 MG/DL (ref 74–99)
GLUCOSE BLD-MCNC: 168 MG/DL (ref 74–99)
GLUCOSE BLD-MCNC: 187 MG/DL (ref 74–99)
GLUCOSE SERPL-MCNC: 120 MG/DL (ref 74–99)
HCT VFR BLD AUTO: 32.7 % (ref 34–48)
HGB BLD-MCNC: 10.3 G/DL (ref 11.5–15.5)
LYMPHOCYTES NFR BLD: 1.4 K/UL (ref 1.5–4)
LYMPHOCYTES RELATIVE PERCENT: 12 % (ref 20–42)
MAGNESIUM SERPL-MCNC: 2 MG/DL (ref 1.6–2.6)
MCH RBC QN AUTO: 28.3 PG (ref 26–35)
MCHC RBC AUTO-ENTMCNC: 31.5 G/DL (ref 32–34.5)
MCV RBC AUTO: 89.8 FL (ref 80–99.9)
MONOCYTES NFR BLD: 0.9 K/UL (ref 0.1–0.95)
MONOCYTES NFR BLD: 8 % (ref 2–12)
NEUTROPHILS NFR BLD: 78 % (ref 43–80)
NEUTS SEG NFR BLD: 8.9 K/UL (ref 1.8–7.3)
PHOSPHATE SERPL-MCNC: 2.9 MG/DL (ref 2.5–4.5)
PLATELET # BLD AUTO: 613 K/UL (ref 130–450)
PMV BLD AUTO: 8.6 FL (ref 7–12)
POTASSIUM SERPL-SCNC: 3.4 MMOL/L (ref 3.5–5)
PROT SERPL-MCNC: 6.1 G/DL (ref 6.4–8.3)
RBC # BLD AUTO: 3.64 M/UL (ref 3.5–5.5)
RBC # BLD: ABNORMAL 10*6/UL
RBC # BLD: ABNORMAL 10*6/UL
SODIUM SERPL-SCNC: 139 MMOL/L (ref 132–146)
WBC OTHER # BLD: 11.4 K/UL (ref 4.5–11.5)

## 2025-01-11 PROCEDURE — 83735 ASSAY OF MAGNESIUM: CPT

## 2025-01-11 PROCEDURE — 1200000000 HC SEMI PRIVATE

## 2025-01-11 PROCEDURE — 6370000000 HC RX 637 (ALT 250 FOR IP): Performed by: INTERNAL MEDICINE

## 2025-01-11 PROCEDURE — 82962 GLUCOSE BLOOD TEST: CPT

## 2025-01-11 PROCEDURE — 6360000002 HC RX W HCPCS: Performed by: SURGERY

## 2025-01-11 PROCEDURE — 6370000000 HC RX 637 (ALT 250 FOR IP): Performed by: SURGERY

## 2025-01-11 PROCEDURE — 2500000003 HC RX 250 WO HCPCS: Performed by: SURGERY

## 2025-01-11 PROCEDURE — 2580000003 HC RX 258: Performed by: SPECIALIST

## 2025-01-11 PROCEDURE — 84100 ASSAY OF PHOSPHORUS: CPT

## 2025-01-11 PROCEDURE — 36415 COLL VENOUS BLD VENIPUNCTURE: CPT

## 2025-01-11 PROCEDURE — 80053 COMPREHEN METABOLIC PANEL: CPT

## 2025-01-11 PROCEDURE — 85025 COMPLETE CBC W/AUTO DIFF WBC: CPT

## 2025-01-11 PROCEDURE — 6360000002 HC RX W HCPCS: Performed by: SPECIALIST

## 2025-01-11 RX ADMIN — FLUCONAZOLE 400 MG: 2 INJECTION, SOLUTION INTRAVENOUS at 13:40

## 2025-01-11 RX ADMIN — FAMOTIDINE 20 MG: 20 TABLET, FILM COATED ORAL at 08:59

## 2025-01-11 RX ADMIN — MORPHINE SULFATE 4 MG: 4 INJECTION, SOLUTION INTRAMUSCULAR; INTRAVENOUS at 07:26

## 2025-01-11 RX ADMIN — MICONAZOLE NITRATE: 20 POWDER TOPICAL at 20:26

## 2025-01-11 RX ADMIN — OXYCODONE 10 MG: 5 TABLET ORAL at 05:04

## 2025-01-11 RX ADMIN — ACETAMINOPHEN 650 MG: 325 TABLET ORAL at 20:18

## 2025-01-11 RX ADMIN — FAMOTIDINE 20 MG: 20 TABLET, FILM COATED ORAL at 20:17

## 2025-01-11 RX ADMIN — OXYCODONE 10 MG: 5 TABLET ORAL at 09:04

## 2025-01-11 RX ADMIN — ENOXAPARIN SODIUM 40 MG: 100 INJECTION SUBCUTANEOUS at 08:59

## 2025-01-11 RX ADMIN — LEVOTHYROXINE SODIUM 88 MCG: 0.09 TABLET ORAL at 05:04

## 2025-01-11 RX ADMIN — LORAZEPAM 0.5 MG: 0.5 TABLET ORAL at 10:07

## 2025-01-11 RX ADMIN — MONTELUKAST 10 MG: 10 TABLET, FILM COATED ORAL at 20:17

## 2025-01-11 RX ADMIN — ACETAMINOPHEN 650 MG: 325 TABLET ORAL at 15:31

## 2025-01-11 RX ADMIN — DULOXETINE HYDROCHLORIDE 60 MG: 60 CAPSULE, DELAYED RELEASE ORAL at 20:17

## 2025-01-11 RX ADMIN — OXYCODONE 10 MG: 5 TABLET ORAL at 17:15

## 2025-01-11 RX ADMIN — SODIUM CHLORIDE, PRESERVATIVE FREE 10 ML: 5 INJECTION INTRAVENOUS at 20:21

## 2025-01-11 RX ADMIN — ACETAMINOPHEN 650 MG: 325 TABLET ORAL at 08:58

## 2025-01-11 RX ADMIN — SODIUM CHLORIDE 500 MG: 9 INJECTION INTRAMUSCULAR; INTRAVENOUS; SUBCUTANEOUS at 15:33

## 2025-01-11 RX ADMIN — BISACODYL 5 MG: 5 TABLET, COATED ORAL at 08:59

## 2025-01-11 RX ADMIN — MORPHINE SULFATE 4 MG: 4 INJECTION, SOLUTION INTRAMUSCULAR; INTRAVENOUS at 20:18

## 2025-01-11 ASSESSMENT — PAIN DESCRIPTION - LOCATION
LOCATION: ABDOMEN

## 2025-01-11 ASSESSMENT — PAIN SCALES - GENERAL
PAINLEVEL_OUTOF10: 10
PAINLEVEL_OUTOF10: 5
PAINLEVEL_OUTOF10: 10
PAINLEVEL_OUTOF10: 9
PAINLEVEL_OUTOF10: 8
PAINLEVEL_OUTOF10: 4
PAINLEVEL_OUTOF10: 8
PAINLEVEL_OUTOF10: 10
PAINLEVEL_OUTOF10: 10

## 2025-01-11 ASSESSMENT — PAIN DESCRIPTION - ORIENTATION: ORIENTATION: MID;RIGHT;LEFT

## 2025-01-11 ASSESSMENT — PAIN DESCRIPTION - DESCRIPTORS
DESCRIPTORS: DISCOMFORT
DESCRIPTORS: DISCOMFORT
DESCRIPTORS: CRAMPING;ACHING;TENDER

## 2025-01-11 ASSESSMENT — PAIN DESCRIPTION - PAIN TYPE
TYPE: SURGICAL PAIN
TYPE: SURGICAL PAIN

## 2025-01-12 LAB
ALBUMIN SERPL-MCNC: 2.2 G/DL (ref 3.5–5.2)
ALP SERPL-CCNC: 92 U/L (ref 35–104)
ALT SERPL-CCNC: 10 U/L (ref 0–32)
ANION GAP SERPL CALCULATED.3IONS-SCNC: 8 MMOL/L (ref 7–16)
AST SERPL-CCNC: 9 U/L (ref 0–31)
BASOPHILS # BLD: 0.34 K/UL (ref 0–0.2)
BASOPHILS NFR BLD: 4 % (ref 0–2)
BILIRUB SERPL-MCNC: 0.2 MG/DL (ref 0–1.2)
BUN SERPL-MCNC: 10 MG/DL (ref 6–23)
CALCIUM SERPL-MCNC: 8.4 MG/DL (ref 8.6–10.2)
CHLORIDE SERPL-SCNC: 105 MMOL/L (ref 98–107)
CO2 SERPL-SCNC: 28 MMOL/L (ref 22–29)
CREAT SERPL-MCNC: 0.6 MG/DL (ref 0.5–1)
EOSINOPHIL # BLD: 0.43 K/UL (ref 0.05–0.5)
EOSINOPHILS RELATIVE PERCENT: 4 % (ref 0–6)
ERYTHROCYTE [DISTWIDTH] IN BLOOD BY AUTOMATED COUNT: 17.2 % (ref 11.5–15)
GFR, ESTIMATED: >90 ML/MIN/1.73M2
GLUCOSE BLD-MCNC: 153 MG/DL (ref 74–99)
GLUCOSE BLD-MCNC: 155 MG/DL (ref 74–99)
GLUCOSE BLD-MCNC: 162 MG/DL (ref 74–99)
GLUCOSE BLD-MCNC: 207 MG/DL (ref 74–99)
GLUCOSE SERPL-MCNC: 144 MG/DL (ref 74–99)
HCT VFR BLD AUTO: 31.6 % (ref 34–48)
HGB BLD-MCNC: 10 G/DL (ref 11.5–15.5)
LYMPHOCYTES NFR BLD: 0.94 K/UL (ref 1.5–4)
LYMPHOCYTES RELATIVE PERCENT: 10 % (ref 20–42)
MAGNESIUM SERPL-MCNC: 2 MG/DL (ref 1.6–2.6)
MCH RBC QN AUTO: 28.5 PG (ref 26–35)
MCHC RBC AUTO-ENTMCNC: 31.6 G/DL (ref 32–34.5)
MCV RBC AUTO: 90 FL (ref 80–99.9)
MICROORGANISM SPEC CULT: ABNORMAL
MICROORGANISM/AGENT SPEC: ABNORMAL
MONOCYTES NFR BLD: 0.77 K/UL (ref 0.1–0.95)
MONOCYTES NFR BLD: 8 % (ref 2–12)
NEUTROPHILS NFR BLD: 75 % (ref 43–80)
NEUTS SEG NFR BLD: 7.33 K/UL (ref 1.8–7.3)
PHOSPHATE SERPL-MCNC: 3.4 MG/DL (ref 2.5–4.5)
PLATELET # BLD AUTO: 649 K/UL (ref 130–450)
PMV BLD AUTO: 8.6 FL (ref 7–12)
POTASSIUM SERPL-SCNC: 3.4 MMOL/L (ref 3.5–5)
PROT SERPL-MCNC: 5.7 G/DL (ref 6.4–8.3)
RBC # BLD AUTO: 3.51 M/UL (ref 3.5–5.5)
RBC # BLD: ABNORMAL 10*6/UL
SERVICE CMNT-IMP: ABNORMAL
SODIUM SERPL-SCNC: 141 MMOL/L (ref 132–146)
SPECIMEN DESCRIPTION: ABNORMAL
WBC OTHER # BLD: 9.8 K/UL (ref 4.5–11.5)

## 2025-01-12 PROCEDURE — 1200000000 HC SEMI PRIVATE

## 2025-01-12 PROCEDURE — 94640 AIRWAY INHALATION TREATMENT: CPT

## 2025-01-12 PROCEDURE — 82962 GLUCOSE BLOOD TEST: CPT

## 2025-01-12 PROCEDURE — 6360000002 HC RX W HCPCS: Performed by: SURGERY

## 2025-01-12 PROCEDURE — 84100 ASSAY OF PHOSPHORUS: CPT

## 2025-01-12 PROCEDURE — 6360000002 HC RX W HCPCS: Performed by: SPECIALIST

## 2025-01-12 PROCEDURE — 97165 OT EVAL LOW COMPLEX 30 MIN: CPT

## 2025-01-12 PROCEDURE — 2500000003 HC RX 250 WO HCPCS: Performed by: SURGERY

## 2025-01-12 PROCEDURE — 2500000003 HC RX 250 WO HCPCS: Performed by: SPECIALIST

## 2025-01-12 PROCEDURE — 6370000000 HC RX 637 (ALT 250 FOR IP): Performed by: SURGERY

## 2025-01-12 PROCEDURE — 83735 ASSAY OF MAGNESIUM: CPT

## 2025-01-12 PROCEDURE — 97535 SELF CARE MNGMENT TRAINING: CPT

## 2025-01-12 PROCEDURE — 2580000003 HC RX 258: Performed by: SPECIALIST

## 2025-01-12 PROCEDURE — 80053 COMPREHEN METABOLIC PANEL: CPT

## 2025-01-12 PROCEDURE — 85025 COMPLETE CBC W/AUTO DIFF WBC: CPT

## 2025-01-12 RX ORDER — METHOCARBAMOL 500 MG/1
750 TABLET, FILM COATED ORAL 4 TIMES DAILY
Status: DISCONTINUED | OUTPATIENT
Start: 2025-01-12 | End: 2025-01-14 | Stop reason: HOSPADM

## 2025-01-12 RX ORDER — KETOROLAC TROMETHAMINE 30 MG/ML
30 INJECTION, SOLUTION INTRAMUSCULAR; INTRAVENOUS EVERY 6 HOURS PRN
Status: DISCONTINUED | OUTPATIENT
Start: 2025-01-12 | End: 2025-01-14 | Stop reason: HOSPADM

## 2025-01-12 RX ADMIN — OXYCODONE 10 MG: 5 TABLET ORAL at 04:27

## 2025-01-12 RX ADMIN — ACETAMINOPHEN 650 MG: 325 TABLET ORAL at 14:23

## 2025-01-12 RX ADMIN — METHOCARBAMOL TABLETS 750 MG: 500 TABLET, COATED ORAL at 20:00

## 2025-01-12 RX ADMIN — ACETAMINOPHEN 650 MG: 325 TABLET ORAL at 07:31

## 2025-01-12 RX ADMIN — INSULIN LISPRO 1 UNITS: 100 INJECTION, SOLUTION INTRAVENOUS; SUBCUTANEOUS at 16:14

## 2025-01-12 RX ADMIN — MICONAZOLE NITRATE: 20 POWDER TOPICAL at 10:17

## 2025-01-12 RX ADMIN — MICONAZOLE NITRATE: 20 POWDER TOPICAL at 20:05

## 2025-01-12 RX ADMIN — SODIUM CHLORIDE 500 MG: 9 INJECTION INTRAMUSCULAR; INTRAVENOUS; SUBCUTANEOUS at 14:27

## 2025-01-12 RX ADMIN — METHOCARBAMOL TABLETS 750 MG: 500 TABLET, COATED ORAL at 17:37

## 2025-01-12 RX ADMIN — OXYCODONE 10 MG: 5 TABLET ORAL at 22:12

## 2025-01-12 RX ADMIN — MONTELUKAST 10 MG: 10 TABLET, FILM COATED ORAL at 20:01

## 2025-01-12 RX ADMIN — FAMOTIDINE 20 MG: 20 TABLET, FILM COATED ORAL at 20:01

## 2025-01-12 RX ADMIN — DULOXETINE HYDROCHLORIDE 60 MG: 60 CAPSULE, DELAYED RELEASE ORAL at 20:01

## 2025-01-12 RX ADMIN — ONDANSETRON 4 MG: 2 INJECTION, SOLUTION INTRAMUSCULAR; INTRAVENOUS at 10:14

## 2025-01-12 RX ADMIN — BISACODYL 5 MG: 5 TABLET, COATED ORAL at 07:31

## 2025-01-12 RX ADMIN — MORPHINE SULFATE 4 MG: 4 INJECTION, SOLUTION INTRAMUSCULAR; INTRAVENOUS at 20:02

## 2025-01-12 RX ADMIN — FAMOTIDINE 20 MG: 20 TABLET, FILM COATED ORAL at 07:31

## 2025-01-12 RX ADMIN — FLUCONAZOLE 400 MG: 2 INJECTION, SOLUTION INTRAVENOUS at 14:30

## 2025-01-12 RX ADMIN — ACETAMINOPHEN 650 MG: 325 TABLET ORAL at 20:01

## 2025-01-12 RX ADMIN — ENOXAPARIN SODIUM 40 MG: 100 INJECTION SUBCUTANEOUS at 07:31

## 2025-01-12 RX ADMIN — LEVOTHYROXINE SODIUM 88 MCG: 0.09 TABLET ORAL at 05:20

## 2025-01-12 RX ADMIN — OXYCODONE 10 MG: 5 TABLET ORAL at 08:31

## 2025-01-12 RX ADMIN — SODIUM CHLORIDE, PRESERVATIVE FREE 10 ML: 5 INJECTION INTRAVENOUS at 07:32

## 2025-01-12 RX ADMIN — SODIUM CHLORIDE, PRESERVATIVE FREE 10 ML: 5 INJECTION INTRAVENOUS at 20:03

## 2025-01-12 RX ADMIN — MORPHINE SULFATE 4 MG: 4 INJECTION, SOLUTION INTRAMUSCULAR; INTRAVENOUS at 12:23

## 2025-01-12 RX ADMIN — ALBUTEROL SULFATE 2.5 MG: 2.5 SOLUTION RESPIRATORY (INHALATION) at 04:41

## 2025-01-12 RX ADMIN — METHOCARBAMOL TABLETS 750 MG: 500 TABLET, COATED ORAL at 12:27

## 2025-01-12 ASSESSMENT — PAIN DESCRIPTION - ORIENTATION
ORIENTATION: RIGHT;LEFT;MID
ORIENTATION: INNER;RIGHT
ORIENTATION: LOWER
ORIENTATION: INNER
ORIENTATION: RIGHT;LEFT;MID
ORIENTATION: RIGHT;LEFT;MID

## 2025-01-12 ASSESSMENT — PAIN DESCRIPTION - LOCATION
LOCATION: ABDOMEN

## 2025-01-12 ASSESSMENT — PAIN SCALES - GENERAL
PAINLEVEL_OUTOF10: 3
PAINLEVEL_OUTOF10: 3
PAINLEVEL_OUTOF10: 9
PAINLEVEL_OUTOF10: 7
PAINLEVEL_OUTOF10: 9
PAINLEVEL_OUTOF10: 8
PAINLEVEL_OUTOF10: 10
PAINLEVEL_OUTOF10: 3

## 2025-01-12 ASSESSMENT — PAIN DESCRIPTION - DESCRIPTORS
DESCRIPTORS: DISCOMFORT;SORE;TENDER
DESCRIPTORS: DISCOMFORT;SORE;TENDER
DESCRIPTORS: ACHING;DULL;JABBING
DESCRIPTORS: ACHING;DISCOMFORT;SORE

## 2025-01-13 LAB
ALBUMIN SERPL-MCNC: 2.5 G/DL (ref 3.5–5.2)
ALP SERPL-CCNC: 95 U/L (ref 35–104)
ALT SERPL-CCNC: 9 U/L (ref 0–32)
ANION GAP SERPL CALCULATED.3IONS-SCNC: 10 MMOL/L (ref 7–16)
AST SERPL-CCNC: 11 U/L (ref 0–31)
BASOPHILS # BLD: 0.03 K/UL (ref 0–0.2)
BASOPHILS NFR BLD: 0 % (ref 0–2)
BILIRUB SERPL-MCNC: 0.2 MG/DL (ref 0–1.2)
BUN SERPL-MCNC: 8 MG/DL (ref 6–23)
CALCIUM SERPL-MCNC: 8.7 MG/DL (ref 8.6–10.2)
CHLORIDE SERPL-SCNC: 99 MMOL/L (ref 98–107)
CK SERPL-CCNC: 16 U/L (ref 20–180)
CO2 SERPL-SCNC: 29 MMOL/L (ref 22–29)
CREAT SERPL-MCNC: 0.7 MG/DL (ref 0.5–1)
EOSINOPHIL # BLD: 0.29 K/UL (ref 0.05–0.5)
EOSINOPHILS RELATIVE PERCENT: 3 % (ref 0–6)
ERYTHROCYTE [DISTWIDTH] IN BLOOD BY AUTOMATED COUNT: 17.1 % (ref 11.5–15)
GFR, ESTIMATED: >90 ML/MIN/1.73M2
GLUCOSE BLD-MCNC: 127 MG/DL (ref 74–99)
GLUCOSE BLD-MCNC: 130 MG/DL (ref 74–99)
GLUCOSE BLD-MCNC: 155 MG/DL (ref 74–99)
GLUCOSE BLD-MCNC: 168 MG/DL (ref 74–99)
GLUCOSE SERPL-MCNC: 124 MG/DL (ref 74–99)
HCT VFR BLD AUTO: 31.3 % (ref 34–48)
HGB BLD-MCNC: 9.5 G/DL (ref 11.5–15.5)
IMM GRANULOCYTES # BLD AUTO: 0.17 K/UL (ref 0–0.58)
IMM GRANULOCYTES NFR BLD: 2 % (ref 0–5)
LYMPHOCYTES NFR BLD: 1.44 K/UL (ref 1.5–4)
LYMPHOCYTES RELATIVE PERCENT: 16 % (ref 20–42)
MAGNESIUM SERPL-MCNC: 1.9 MG/DL (ref 1.6–2.6)
MCH RBC QN AUTO: 28.2 PG (ref 26–35)
MCHC RBC AUTO-ENTMCNC: 30.4 G/DL (ref 32–34.5)
MCV RBC AUTO: 92.9 FL (ref 80–99.9)
MONOCYTES NFR BLD: 0.75 K/UL (ref 0.1–0.95)
MONOCYTES NFR BLD: 8 % (ref 2–12)
NEUTROPHILS NFR BLD: 71 % (ref 43–80)
NEUTS SEG NFR BLD: 6.46 K/UL (ref 1.8–7.3)
PHOSPHATE SERPL-MCNC: 3.4 MG/DL (ref 2.5–4.5)
PLATELET # BLD AUTO: 743 K/UL (ref 130–450)
PMV BLD AUTO: 8.8 FL (ref 7–12)
POTASSIUM SERPL-SCNC: 3.5 MMOL/L (ref 3.5–5)
PROT SERPL-MCNC: 6.3 G/DL (ref 6.4–8.3)
RBC # BLD AUTO: 3.37 M/UL (ref 3.5–5.5)
SODIUM SERPL-SCNC: 138 MMOL/L (ref 132–146)
WBC OTHER # BLD: 9.1 K/UL (ref 4.5–11.5)

## 2025-01-13 PROCEDURE — 82550 ASSAY OF CK (CPK): CPT

## 2025-01-13 PROCEDURE — 2500000003 HC RX 250 WO HCPCS: Performed by: SPECIALIST

## 2025-01-13 PROCEDURE — 82962 GLUCOSE BLOOD TEST: CPT

## 2025-01-13 PROCEDURE — 6370000000 HC RX 637 (ALT 250 FOR IP): Performed by: SURGERY

## 2025-01-13 PROCEDURE — 2580000003 HC RX 258: Performed by: SPECIALIST

## 2025-01-13 PROCEDURE — 84100 ASSAY OF PHOSPHORUS: CPT

## 2025-01-13 PROCEDURE — 80053 COMPREHEN METABOLIC PANEL: CPT

## 2025-01-13 PROCEDURE — 83735 ASSAY OF MAGNESIUM: CPT

## 2025-01-13 PROCEDURE — 85025 COMPLETE CBC W/AUTO DIFF WBC: CPT

## 2025-01-13 PROCEDURE — 6370000000 HC RX 637 (ALT 250 FOR IP): Performed by: INTERNAL MEDICINE

## 2025-01-13 PROCEDURE — 1200000000 HC SEMI PRIVATE

## 2025-01-13 PROCEDURE — 97530 THERAPEUTIC ACTIVITIES: CPT

## 2025-01-13 PROCEDURE — 6360000002 HC RX W HCPCS: Performed by: SPECIALIST

## 2025-01-13 PROCEDURE — 2500000003 HC RX 250 WO HCPCS: Performed by: SURGERY

## 2025-01-13 PROCEDURE — 6360000002 HC RX W HCPCS: Performed by: SURGERY

## 2025-01-13 RX ADMIN — DULOXETINE HYDROCHLORIDE 60 MG: 60 CAPSULE, DELAYED RELEASE ORAL at 20:28

## 2025-01-13 RX ADMIN — METHOCARBAMOL TABLETS 750 MG: 500 TABLET, COATED ORAL at 09:35

## 2025-01-13 RX ADMIN — OXYCODONE 10 MG: 5 TABLET ORAL at 23:36

## 2025-01-13 RX ADMIN — ACETAMINOPHEN 650 MG: 325 TABLET ORAL at 02:50

## 2025-01-13 RX ADMIN — MONTELUKAST 10 MG: 10 TABLET, FILM COATED ORAL at 20:27

## 2025-01-13 RX ADMIN — SODIUM CHLORIDE 500 MG: 9 INJECTION INTRAMUSCULAR; INTRAVENOUS; SUBCUTANEOUS at 15:44

## 2025-01-13 RX ADMIN — METHOCARBAMOL TABLETS 750 MG: 500 TABLET, COATED ORAL at 13:10

## 2025-01-13 RX ADMIN — FLUCONAZOLE 400 MG: 2 INJECTION, SOLUTION INTRAVENOUS at 15:51

## 2025-01-13 RX ADMIN — FAMOTIDINE 20 MG: 20 TABLET, FILM COATED ORAL at 20:28

## 2025-01-13 RX ADMIN — ACETAMINOPHEN 650 MG: 325 TABLET ORAL at 09:35

## 2025-01-13 RX ADMIN — FAMOTIDINE 20 MG: 20 TABLET, FILM COATED ORAL at 09:35

## 2025-01-13 RX ADMIN — BISACODYL 5 MG: 5 TABLET, COATED ORAL at 09:35

## 2025-01-13 RX ADMIN — METHOCARBAMOL TABLETS 750 MG: 500 TABLET, COATED ORAL at 17:24

## 2025-01-13 RX ADMIN — SODIUM CHLORIDE, PRESERVATIVE FREE 10 ML: 5 INJECTION INTRAVENOUS at 20:35

## 2025-01-13 RX ADMIN — OXYCODONE 10 MG: 5 TABLET ORAL at 02:51

## 2025-01-13 RX ADMIN — ACETAMINOPHEN 650 MG: 325 TABLET ORAL at 20:28

## 2025-01-13 RX ADMIN — METHOCARBAMOL TABLETS 750 MG: 500 TABLET, COATED ORAL at 20:27

## 2025-01-13 RX ADMIN — LORAZEPAM 0.5 MG: 0.5 TABLET ORAL at 00:44

## 2025-01-13 RX ADMIN — ENOXAPARIN SODIUM 40 MG: 100 INJECTION SUBCUTANEOUS at 09:35

## 2025-01-13 RX ADMIN — MICONAZOLE NITRATE: 20 POWDER TOPICAL at 09:55

## 2025-01-13 RX ADMIN — SODIUM CHLORIDE, PRESERVATIVE FREE 10 ML: 5 INJECTION INTRAVENOUS at 20:34

## 2025-01-13 RX ADMIN — MICONAZOLE NITRATE: 20 POWDER TOPICAL at 20:30

## 2025-01-13 RX ADMIN — OXYCODONE 10 MG: 5 TABLET ORAL at 09:47

## 2025-01-13 RX ADMIN — LEVOTHYROXINE SODIUM 88 MCG: 0.09 TABLET ORAL at 06:13

## 2025-01-13 ASSESSMENT — PAIN DESCRIPTION - LOCATION
LOCATION: ABDOMEN

## 2025-01-13 ASSESSMENT — PAIN SCALES - GENERAL
PAINLEVEL_OUTOF10: 4
PAINLEVEL_OUTOF10: 7
PAINLEVEL_OUTOF10: 7
PAINLEVEL_OUTOF10: 8

## 2025-01-13 ASSESSMENT — PAIN DESCRIPTION - DESCRIPTORS
DESCRIPTORS: ACHING;DULL;JABBING
DESCRIPTORS: ACHING;SORE;TENDER

## 2025-01-13 ASSESSMENT — PAIN DESCRIPTION - ORIENTATION: ORIENTATION: MID

## 2025-01-13 NOTE — CARE COORDINATION
CM note: Follow up visit made to patient to obtain SNF choices.  Pt sleeping but she had her choices marked 1) [facility]; 2) [facility] and 3) [facility].  Referrals called to liaisons.  [facility] does not have any isolation rooms at the present time.            ADDENDUM:2:30 PM  Pt was accepted at [facility] ([facility]) and facility to submit for pre-cert today.  [facility] completed as insurance provider requires it for pre-cert.  [facility] will need signed by physician.

## 2025-01-13 NOTE — CARE COORDINATION
MAMADOU note: Pt was declined by [facility].  Informed patient and she is unsure of her discharge plan.  Provided her with another SNF list as she no longer had the one provided to her last week.  Pt states that she may want to still consider returning home, she will speak with her family/friends to secure a caregiver.  Notified [facility] care as they have been following.  For SNF, pt will NEED an accepting facility, [facility], [facility] and [facility].  For home with [facility], will NEED orders and a start of care date from [facility].  Pt has a midline.  She is POD7 colon resection, end colostomy and retention suture placement.  She is ambulatory and has been seen by ostomy nurse.

## 2025-01-14 VITALS
BODY MASS INDEX: 38.11 KG/M2 | DIASTOLIC BLOOD PRESSURE: 73 MMHG | SYSTOLIC BLOOD PRESSURE: 129 MMHG | TEMPERATURE: 98.1 F | HEIGHT: 63 IN | HEART RATE: 82 BPM | RESPIRATION RATE: 18 BRPM | OXYGEN SATURATION: 96 % | WEIGHT: 215.1 LBS

## 2025-01-14 LAB
ALBUMIN SERPL-MCNC: 2.4 G/DL (ref 3.5–5.2)
ALP SERPL-CCNC: 108 U/L (ref 35–104)
ALT SERPL-CCNC: 10 U/L (ref 0–32)
ANION GAP SERPL CALCULATED.3IONS-SCNC: 8 MMOL/L (ref 7–16)
AST SERPL-CCNC: 12 U/L (ref 0–31)
BASOPHILS # BLD: 0.05 K/UL (ref 0–0.2)
BASOPHILS NFR BLD: 1 % (ref 0–2)
BILIRUB SERPL-MCNC: 0.2 MG/DL (ref 0–1.2)
BUN SERPL-MCNC: 7 MG/DL (ref 6–23)
CALCIUM SERPL-MCNC: 8.4 MG/DL (ref 8.6–10.2)
CHLORIDE SERPL-SCNC: 100 MMOL/L (ref 98–107)
CO2 SERPL-SCNC: 28 MMOL/L (ref 22–29)
CREAT SERPL-MCNC: 0.6 MG/DL (ref 0.5–1)
EOSINOPHIL # BLD: 0.34 K/UL (ref 0.05–0.5)
EOSINOPHILS RELATIVE PERCENT: 3 % (ref 0–6)
ERYTHROCYTE [DISTWIDTH] IN BLOOD BY AUTOMATED COUNT: 17.1 % (ref 11.5–15)
GFR, ESTIMATED: >90 ML/MIN/1.73M2
GLUCOSE BLD-MCNC: 120 MG/DL (ref 74–99)
GLUCOSE BLD-MCNC: 128 MG/DL (ref 74–99)
GLUCOSE BLD-MCNC: 154 MG/DL (ref 74–99)
GLUCOSE SERPL-MCNC: 120 MG/DL (ref 74–99)
HCT VFR BLD AUTO: 31.7 % (ref 34–48)
HGB BLD-MCNC: 9.6 G/DL (ref 11.5–15.5)
IMM GRANULOCYTES # BLD AUTO: 0.15 K/UL (ref 0–0.58)
IMM GRANULOCYTES NFR BLD: 1 % (ref 0–5)
LYMPHOCYTES NFR BLD: 1.63 K/UL (ref 1.5–4)
LYMPHOCYTES RELATIVE PERCENT: 15 % (ref 20–42)
MAGNESIUM SERPL-MCNC: 2 MG/DL (ref 1.6–2.6)
MCH RBC QN AUTO: 27.6 PG (ref 26–35)
MCHC RBC AUTO-ENTMCNC: 30.3 G/DL (ref 32–34.5)
MCV RBC AUTO: 91.1 FL (ref 80–99.9)
MONOCYTES NFR BLD: 0.99 K/UL (ref 0.1–0.95)
MONOCYTES NFR BLD: 9 % (ref 2–12)
NEUTROPHILS NFR BLD: 71 % (ref 43–80)
NEUTS SEG NFR BLD: 7.7 K/UL (ref 1.8–7.3)
PHOSPHATE SERPL-MCNC: 3.7 MG/DL (ref 2.5–4.5)
PLATELET # BLD AUTO: 798 K/UL (ref 130–450)
PMV BLD AUTO: 9.1 FL (ref 7–12)
POTASSIUM SERPL-SCNC: 3.7 MMOL/L (ref 3.5–5)
PROT SERPL-MCNC: 6.3 G/DL (ref 6.4–8.3)
RBC # BLD AUTO: 3.48 M/UL (ref 3.5–5.5)
SODIUM SERPL-SCNC: 136 MMOL/L (ref 132–146)
WBC OTHER # BLD: 10.9 K/UL (ref 4.5–11.5)

## 2025-01-14 PROCEDURE — 6360000002 HC RX W HCPCS: Performed by: SURGERY

## 2025-01-14 PROCEDURE — 82962 GLUCOSE BLOOD TEST: CPT

## 2025-01-14 PROCEDURE — 6370000000 HC RX 637 (ALT 250 FOR IP): Performed by: SURGERY

## 2025-01-14 PROCEDURE — 6360000002 HC RX W HCPCS: Performed by: SPECIALIST

## 2025-01-14 PROCEDURE — 84100 ASSAY OF PHOSPHORUS: CPT

## 2025-01-14 PROCEDURE — 80053 COMPREHEN METABOLIC PANEL: CPT

## 2025-01-14 PROCEDURE — 2580000003 HC RX 258: Performed by: SPECIALIST

## 2025-01-14 PROCEDURE — 83735 ASSAY OF MAGNESIUM: CPT

## 2025-01-14 PROCEDURE — 85025 COMPLETE CBC W/AUTO DIFF WBC: CPT

## 2025-01-14 PROCEDURE — 36415 COLL VENOUS BLD VENIPUNCTURE: CPT

## 2025-01-14 RX ADMIN — ACETAMINOPHEN 650 MG: 325 TABLET ORAL at 02:44

## 2025-01-14 RX ADMIN — METHOCARBAMOL TABLETS 750 MG: 500 TABLET, COATED ORAL at 13:05

## 2025-01-14 RX ADMIN — BISACODYL 5 MG: 5 TABLET, COATED ORAL at 08:03

## 2025-01-14 RX ADMIN — FLUCONAZOLE 400 MG: 2 INJECTION, SOLUTION INTRAVENOUS at 14:44

## 2025-01-14 RX ADMIN — FAMOTIDINE 20 MG: 20 TABLET, FILM COATED ORAL at 08:03

## 2025-01-14 RX ADMIN — OXYCODONE 10 MG: 5 TABLET ORAL at 08:10

## 2025-01-14 RX ADMIN — OXYCODONE 10 MG: 5 TABLET ORAL at 14:38

## 2025-01-14 RX ADMIN — METHOCARBAMOL TABLETS 750 MG: 500 TABLET, COATED ORAL at 08:03

## 2025-01-14 RX ADMIN — SODIUM CHLORIDE 500 MG: 9 INJECTION INTRAMUSCULAR; INTRAVENOUS; SUBCUTANEOUS at 14:39

## 2025-01-14 RX ADMIN — MORPHINE SULFATE 4 MG: 4 INJECTION, SOLUTION INTRAMUSCULAR; INTRAVENOUS at 02:52

## 2025-01-14 RX ADMIN — ACETAMINOPHEN 650 MG: 325 TABLET ORAL at 13:05

## 2025-01-14 RX ADMIN — LEVOTHYROXINE SODIUM 88 MCG: 0.09 TABLET ORAL at 06:01

## 2025-01-14 RX ADMIN — MICONAZOLE NITRATE: 20 POWDER TOPICAL at 08:04

## 2025-01-14 RX ADMIN — ENOXAPARIN SODIUM 40 MG: 100 INJECTION SUBCUTANEOUS at 08:03

## 2025-01-14 RX ADMIN — ONDANSETRON 4 MG: 2 INJECTION, SOLUTION INTRAMUSCULAR; INTRAVENOUS at 09:38

## 2025-01-14 RX ADMIN — ACETAMINOPHEN 650 MG: 325 TABLET ORAL at 08:03

## 2025-01-14 RX ADMIN — MORPHINE SULFATE 4 MG: 4 INJECTION, SOLUTION INTRAMUSCULAR; INTRAVENOUS at 10:22

## 2025-01-14 ASSESSMENT — PAIN DESCRIPTION - DESCRIPTORS
DESCRIPTORS: DISCOMFORT;NAGGING;GNAWING
DESCRIPTORS: ACHING;DISCOMFORT;SORE;TENDER
DESCRIPTORS: DISCOMFORT;SORE;TENDER
DESCRIPTORS: DISCOMFORT;SORE;TENDER

## 2025-01-14 ASSESSMENT — PAIN DESCRIPTION - LOCATION
LOCATION: ABDOMEN

## 2025-01-14 ASSESSMENT — PAIN SCALES - GENERAL
PAINLEVEL_OUTOF10: 5
PAINLEVEL_OUTOF10: 9
PAINLEVEL_OUTOF10: 4
PAINLEVEL_OUTOF10: 4
PAINLEVEL_OUTOF10: 9
PAINLEVEL_OUTOF10: 3
PAINLEVEL_OUTOF10: 7
PAINLEVEL_OUTOF10: 10

## 2025-01-14 ASSESSMENT — PAIN DESCRIPTION - ORIENTATION
ORIENTATION: LOWER

## 2025-01-14 NOTE — FLOWSHEET NOTE
Inpatient Wound Care    Admit Date: 1/2/2025  7:13 AM    Reason for consult:  abd wound    Significant history:    Past Medical History:   Diagnosis Date    Arthritis     Asthma     Diabetes mellitus (HCC)     Sleep apnea     cpap   #10    Thyroid disease        Findings:     01/14/25 1141   Wound 01/07/25 Abdomen Mid   Date First Assessed/Time First Assessed: 01/07/25 1529   Primary Wound Type: Surgical Type  Location: Abdomen  Wound Location Orientation: Mid   Wound Cleansed Cleansed with saline   Wound Length (cm) 25 cm   Wound Width (cm) 1.8 cm   Wound Depth (cm) 3.2 cm   Wound Surface Area (cm^2) 45 cm^2   Change in Wound Size % (l*w) 0   Wound Volume (cm^3) 144 cm^3   Wound Healing % 20   Drainage Amount Small (< 25%)   Drainage Description Serosanguinous   Odor None         Impression:  surgicla wound    Interventions in place:  using moist gauze dressing cover with abd    Plan:  Cont dressing changes daily and prn if needed    Pt to follow up with surgery      Kaylynn Mendes RN 1/14/2025 11:42 AM

## 2025-01-14 NOTE — PROGRESS NOTES
CC: pelvic abscess   Face to face encounter   SUBJECTIVE:  1/10/2025- patient is in bed- feeling better today. Has been afebrile. On room air.     1/9/2025-In bed has pain still drain serosang no f/c/n/v/d  1/8  Patient is in bed- still with abdominal tenderness.   On room air- has been afebrile.   Strickland to be removed today   Patient is tolerating medications. No reported adverse drug reactions.    Medications:  Scheduled Meds:   DAPTOmycin (CUBICIN) 500 mg in sodium chloride (PF) 0.9 % 10 mL IV syringe  500 mg IntraVENous Q24H    sodium chloride flush  5-40 mL IntraVENous 2 times per day    fluconazole  400 mg IntraVENous Q24H    miconazole   Topical BID    piperacillin-tazobactam  3,375 mg IntraVENous Q8H    dupilumab  300 mg SubCUTAneous Q14 Days    sodium chloride flush  5-40 mL IntraVENous 2 times per day    bisacodyl  5 mg Oral Daily    famotidine  20 mg Oral BID    Or    famotidine (PEPCID) injection  20 mg IntraVENous BID    enoxaparin  40 mg SubCUTAneous Daily    acetaminophen  650 mg Oral Q6H    DULoxetine  60 mg Oral Nightly    levothyroxine  88 mcg Oral Daily    montelukast  10 mg Oral Nightly    insulin lispro  0-8 Units SubCUTAneous 4x Daily AC & HS     Continuous Infusions:   sodium chloride      sodium chloride      dextrose       PRN Meds:sodium chloride flush, sodium chloride, LORazepam, albuterol, morphine **OR** morphine, oxyCODONE **OR** oxyCODONE, sodium chloride flush, sodium chloride, ondansetron **OR** ondansetron, LORazepam, glucose, dextrose bolus **OR** dextrose bolus, glucagon (rDNA), dextrose  OBJECTIVE:  Patient Vitals for the past 24 hrs:   BP Temp Temp src Pulse Resp SpO2 Height   01/10/25 0615 -- -- -- -- 18 -- --   01/10/25 0546 (!) 143/84 98.7 °F (37.1 °C) Oral 87 18 97 % --   01/09/25 2324 -- -- -- -- 18 -- --   01/09/25 2221 -- -- -- -- 16 -- --   01/09/25 1745 132/80 98.6 °F (37 °C) Oral 85 16 96 % --   01/09/25 1255 -- -- -- -- -- -- 1.6 m (5' 2.99\")     Constitutional: 
      CC: pelvic abscess   Face to face encounter   SUBJECTIVE:  1/12/2025- afebrile RA wbc9.8 cr0.6 feels ok  no f/c  1/11 in chair has no c/o   1/10 patient is in bed- feeling better today. Has been afebrile. On room air.   1/9/2025-In bed has pain still drain serosang no f/c/n/v/d  1/8  Patient is in bed- still with abdominal tenderness.   On room air- has been afebrile.   Strickland to be removed today   Patient is tolerating medications. No reported adverse drug reactions.    Medications:  Scheduled Meds:   DAPTOmycin (CUBICIN) 500 mg in sodium chloride (PF) 0.9 % 10 mL IV syringe  500 mg IntraVENous Q24H    sodium chloride flush  5-40 mL IntraVENous 2 times per day    fluconazole  400 mg IntraVENous Q24H    miconazole   Topical BID    dupilumab  300 mg SubCUTAneous Q14 Days    sodium chloride flush  5-40 mL IntraVENous 2 times per day    bisacodyl  5 mg Oral Daily    famotidine  20 mg Oral BID    Or    famotidine (PEPCID) injection  20 mg IntraVENous BID    enoxaparin  40 mg SubCUTAneous Daily    acetaminophen  650 mg Oral Q6H    DULoxetine  60 mg Oral Nightly    levothyroxine  88 mcg Oral Daily    montelukast  10 mg Oral Nightly    insulin lispro  0-8 Units SubCUTAneous 4x Daily AC & HS     Continuous Infusions:   sodium chloride      sodium chloride      dextrose       PRN Meds:sodium chloride flush, sodium chloride, LORazepam, albuterol, morphine **OR** morphine, oxyCODONE **OR** oxyCODONE, sodium chloride flush, sodium chloride, ondansetron **OR** ondansetron, LORazepam, glucose, dextrose bolus **OR** dextrose bolus, glucagon (rDNA), dextrose  OBJECTIVE:  Patient Vitals for the past 24 hrs:   BP Temp Temp src Pulse Resp SpO2   01/12/25 0901 -- -- -- -- 20 --   01/12/25 0831 -- -- -- -- 18 --   01/12/25 0457 (!) 142/83 98.1 °F (36.7 °C) -- 81 18 94 %   01/12/25 0427 135/81 98.7 °F (37.1 °C) Oral 86 18 96 %   01/11/25 2048 -- -- -- -- 16 --   01/11/25 1711 (!) 139/91 98.6 °F (37 °C) Oral 91 18 97 % 
  General Surgery Progress Note  [facility]  Juan David Carranza MD, MS    Patient's Name/Date of Birth: Velma Mazariegos / 1965    Date: January 12, 2025     Surgeon: MD Dillon    Chief Complaint: colocutaneous fistula    Patient Active Problem List   Diagnosis    Asthma exacerbation    Nicotine addiction    Left ankle sprain    Diverticulitis of colon    Abscess    Right lower quadrant abdominal pain    Colonic diverticular abscess    Decubitus ulcer of right buttock, stage 3 (HCC)    Diabetes mellitus type 2, noninsulin dependent (HCC)    Diverticulitis with perforation and abscess    Colocutaneous fistula    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)    Diverticulitis large intestine w/o perforation or abscess w/o bleeding       Subjective: no change    Objective:  BP (!) 142/83   Pulse 81   Temp 98.1 °F (36.7 °C)   Resp 20   Ht 1.6 m (5' 2.99\")   Wt 97.6 kg (215 lb 1.6 oz)   SpO2 94%   BMI 38.11 kg/m²   Labs:  Recent Labs     01/10/25  0335 01/11/25  0749 01/12/25  0423   WBC 11.7* 11.4 9.8   HGB 10.5* 10.3* 10.0*   HCT 34.8 32.7* 31.6*     Lab Results   Component Value Date    CREATININE 0.6 01/12/2025    BUN 10 01/12/2025     01/12/2025    K 3.4 (L) 01/12/2025     01/12/2025    CO2 28 01/12/2025     No results for input(s): \"LIPASE\", \"AMYLASE\" in the last 72 hours.      General appearance:  NAD  Head: NCAT, PERRLA, EOMI, red conjunctiva  Neck: supple, no masses  Lungs: CTAB, equal chest rise bilateral  Heart: Reg rate  Abdomen: soft, nondistended, tender appropriately, incisions C/D/I, drain output serosanguinous, dressing dry, stoma with stool and air, midline clean packed  Skin; no lesions  Gu: no cva tenderness  Extremities: extremities normal, atraumatic, no cyanosis or edema      Assessment/Plan:  Velma Mazariegos is a 59 y.o. female POD 9 robo sigmoidectomy takedown colocutaneous fiatula drainage abscess complicated by 
  General Surgery Progress Note  [facility]  Juan David Carranza MD, MS    Patient's Name/Date of Birth: Velma Mazariegos / 1965    Date: January 11, 2025     Surgeon: MD Dillon    Chief Complaint: colocutaneous fistula    Patient Active Problem List   Diagnosis    Asthma exacerbation    Nicotine addiction    Left ankle sprain    Diverticulitis of colon    Abscess    Right lower quadrant abdominal pain    Colonic diverticular abscess    Decubitus ulcer of right buttock, stage 3 (HCC)    Diabetes mellitus type 2, noninsulin dependent (HCC)    Diverticulitis with perforation and abscess    Colocutaneous fistula    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)    Diverticulitis large intestine w/o perforation or abscess w/o bleeding       Subjective: no change    Objective:  BP (!) 140/81   Pulse 92   Temp 98.6 °F (37 °C) (Oral)   Resp 18   Ht 1.6 m (5' 2.99\")   Wt 97.6 kg (215 lb 1.6 oz)   SpO2 96%   BMI 38.11 kg/m²   Labs:  Recent Labs     01/09/25  0418 01/10/25  0335 01/11/25  0749   WBC 8.3 11.7* 11.4   HGB 9.8* 10.5* 10.3*   HCT 32.5* 34.8 32.7*     Lab Results   Component Value Date    CREATININE 0.5 01/11/2025    BUN 9 01/11/2025     01/11/2025    K 3.4 (L) 01/11/2025     01/11/2025    CO2 27 01/11/2025     No results for input(s): \"LIPASE\", \"AMYLASE\" in the last 72 hours.      General appearance:  NAD  Head: NCAT, PERRLA, EOMI, red conjunctiva  Neck: supple, no masses  Lungs: CTAB, equal chest rise bilateral  Heart: Reg rate  Abdomen: soft, nondistended, tender appropriately, incisions C/D/I, drain output serosanguinous, dressing dry, stoma with stool and air, midline clean packed  Skin; no lesions  Gu: no cva tenderness  Extremities: extremities normal, atraumatic, no cyanosis or edema      Assessment/Plan:  Velma Mazariegos is a 59 y.o. female POD 9 robo sigmoidectomy takedown colocutaneous fiatula drainage abscess complicated by 
4 Eyes Skin Assessment     NAME:  Velma Mazariegos  YOB: 1965  MEDICAL RECORD NUMBER:  [id number]    The patient is being assessed for  Admission    I agree that at least one RN has performed a thorough Head to Toe Skin Assessment on the patient. ALL assessment sites listed below have been assessed.      Areas assessed by both nurses:    Head, Face, Ears, Shoulders, Back, Chest, Arms, Elbows, Hands, Sacrum. Buttock, Coccyx, Ischium, and Legs. Feet and Heels        Does the Patient have a Wound? Yes wound(s) were present on assessment. LDA wound assessment was Initiated and completed by RN       Dg Prevention initiated by RN: Yes  Wound Care Orders initiated by RN: No    Pressure Injury (Stage 3,4, Unstageable, DTI, NWPT, and Complex wounds) if present, place Wound referral order by RN under : No    New Ostomies, if present place, Ostomy referral order under : No     Nurse 1 eSignature: Electronically signed by Charisse Goncalves RN on 1/2/25 at 3:26 PM EST    **SHARE this note so that the co-signing nurse can place an eSignature**    Nurse 2 eSignature: Electronically signed by Bouchra Cox RN on 1/2/25 at 4:24 PM EST    
CT wo obvious leak but wound drainage concerning for stool  Proceed to OR for dx lap poss exlap poss ostomy    Juan David Carranza MD, MS, FACS, [facility]  General and Bariatric Surgery  [phone] (p)  1/6/2025  2:24 PM      
Called [facility] wes at this time to give nurse to nurse. No answer at this time.     Attempted to call report for the second time. No answer from facility.  
Comprehensive Nutrition Assessment    Type and Reason for Visit:  Initial, LOS, Positive nutrition screen    Nutrition Recommendations/Plan:   Continue Current Diet (Regular)  Continue ONS (high garth/high protein) TID  Begin Richard BID  Consult RD as needed      Malnutrition Assessment:  Malnutrition Status:  At risk for malnutrition (chronic diverticulitis) (01/09/25 1302)    Context:  Chronic Illness     Findings of the 6 clinical characteristics of malnutrition:  Energy Intake:  Mild decrease in energy intake (poor po intake prior to admission)  Weight Loss:  No weight loss     Body Fat Loss:  No body fat loss     Muscle Mass Loss:  No muscle mass loss    Fluid Accumulation:  No fluid accumulation     Strength:  Not Performed    Nutrition Assessment:    Pt admit w/ diverticulitits w/ perforation and abscess. Pt w/complicated diverticulitis now s/p robo sigmoidectomy 1/2 complicated by anastomotic leak, now s/p diagnostic laparoscopy converted to open, partial colon resection, end colostomy, and retention suture placement 1/6. PMHx: Arthritis, Asthma, DM, KATHERINE, Hypothyroid. Pt w/ po intake %, ONS ordered per MD, continue. Will begin Richard for wound healing, continue inpatient monitoring, f/up per policy.    Nutrition Related Findings:    A/O x4, I/O +7655 since admit, abd distended, BS hypo x4, Ostomy in place, e'lytes wnl Wound Type: Surgical Incision, Pressure Injury, Stage I (R-buttock wound)       Current Nutrition Intake & Therapies:    Average Meal Intake: %  Average Supplements Intake: 26-50%  ADULT ORAL NUTRITION SUPPLEMENT; Breakfast, Lunch, Dinner; Standard High Calorie/High Protein Oral Supplement  ADULT DIET; Regular  ADULT ORAL NUTRITION SUPPLEMENT; Lunch, Dinner; Wound Healing Oral Supplement  Current Tube Feeding (TF) Orders:  Feeding Route:    Formula:    Schedule:    Feeding Regimen:    Additives/Modulars:    Water Flushes:    Current TF Provides:      Anthropometric 
ET Nurse  Admit Date: 1/2/2025  7:13 AM    Reason for consult:  colostomy    Significant history:    Past Medical History:   Diagnosis Date    Arthritis     Asthma     Diabetes mellitus (HCC)     Sleep apnea     cpap   #10    Thyroid disease        Stoma assessment:  stoma dark maroon red color  Functioning for soft stool  Peristomal skin is clear      Plan:  inst on one piece system  Supplies at bedside  Pt states she may go to rehab for wound care and IV  Discussed diet  Discussed need for ongoing wound care    Kaylynn Mendes RN 1/9/2025 4:20 PM     
ET Nurse  Admit Date: 1/2/2025  7:13 AM    Reason for consult:  colostomy    Significant history:    Past Medical History:   Diagnosis Date    Arthritis     Asthma     Diabetes mellitus (HCC)     Sleep apnea     cpap   #10    Thyroid disease        Stoma assessment:  stoma maroon color      Plan:  reviewed care  Gave supplies for discharge  Coloplast one piece system #[id number]  Coloplast ring and skin barrier strips    Kaylynn Mendes RN 1/10/2025 2:20 PM     
ET Nurse  Admit Date: 1/2/2025  7:13 AM    Reason for consult:  new colostomy    Significant history:    Past Medical History:   Diagnosis Date    Arthritis     Asthma     Diabetes mellitus (HCC)     Sleep apnea     cpap   #10    Thyroid disease        Stoma assessment:  stoma dark maroon  brown color        Redness around stoma      Plan:  lesson   Applied ostomy powder around stoma  Applied one piece system  Applied gasket  Appllied skin barrier rings    Supplies and inst given for discharge  [facility]     Kaylynn Mendes RN 1/14/2025 11:38 AM     
ET Nurse  Admit Date: 1/2/2025  7:13 AM    Reason for consult:  new colostomy    Significant history:    Past Medical History:   Diagnosis Date    Arthritis     Asthma     Diabetes mellitus (HCC)     Sleep apnea     cpap   #10    Thyroid disease        Stoma assessment:  stoma good red color serosang drainae  Pouch is intact      Plan:  began teaching  Inst re emptying    Kaylynn Mendes RN 1/7/2025 12:03 PM     
GENERAL SURGERY  DAILY PROGRESS NOTE    Patient's Name/Date of Birth: Velma Mazariegos / 1965    Date: 2025      Subjective:  Drain removed over weekend. Feels good this morning, just waking up. Walked around a lot yesterday. No pain or issues.     Objective:  Last 24Hrs  Temp  Av.5 °F (36.9 °C)  Min: 98.4 °F (36.9 °C)  Max: 98.6 °F (37 °C)  Resp  Av.8  Min: 16  Max: 20  Pulse  Av.5  Min: 89  Max: 94  Systolic (24hrs), Av , Min:123 , Max:146     Diastolic (24hrs), Av, Min:66, Max:69    SpO2  Av %  Min: 92 %  Max: 92 %    I/O last 3 completed shifts:  In: 480 [P.O.:480]  Out: 500 [Urine:350; Stool:150]      General: In no acute distress, alert and oriented x4  Cardiovascular: Warm throughout, no edema  Respiratory: no respiratory distress, equal chest rise, on room air  Abdomen: soft, minimally distended. Appropriately tender to palpation around midline incision which is c/d/I - retention sutures and packing in place, no strikethrough on dressing. Ostomy dark but patent, gas in bag, 50 cc dark stool. Eczema in pannus improving  Skin: no obvious rashes or lesions appreciated, no jaundice  Extremities: atraumatic, no focal motor deficits, no open wounds. Several eczematous lesions present BLE.       CBC  Recent Labs     25  0749 25  0423 25  0439   WBC 11.4 9.8 9.1   RBC 3.64 3.51 3.37*   HGB 10.3* 10.0* 9.5*   HCT 32.7* 31.6* 31.3*   MCV 89.8 90.0 92.9   MCH 28.3 28.5 28.2   MCHC 31.5* 31.6* 30.4*   RDW 17.2* 17.2* 17.1*   * 649* 743*   MPV 8.6 8.6 8.8       CMP  Recent Labs     25  0749 25  0423    141   K 3.4* 3.4*    105   CO2 27 28   BUN 9 10   CREATININE 0.5 0.6   GLUCOSE 120* 144*   CALCIUM 8.9 8.4*   BILITOT 0.2 0.2   ALKPHOS 127* 92   AST 11 9   ALT 12 10         Assessment/Plan:    Patient Active Problem List   Diagnosis    Asthma exacerbation    Nicotine addiction    Left ankle sprain    Diverticulitis of colon 
GENERAL SURGERY  DAILY PROGRESS NOTE    Patient's Name/Date of Birth: Velma Mazariegos / 1965    Date: 2025      Subjective:  Had increased pain yesterday throughout the right side.  Patient ultimately underwent a repeat CT scan of the chest showed some subcutaneous air throughout the left.  Otherwise no intra-abdominal findings.  Patient's pain medications were changed and since then her pain has improved.  Still only having minimal bowel function      Objective:  Last 24Hrs  Temp  Av.7 °F (36.5 °C)  Min: 97.2 °F (36.2 °C)  Max: 97.9 °F (36.6 °C)  Resp  Av.1  Min: 17  Max: 20  Pulse  Av.3  Min: 78  Max: 91  Systolic (24hrs), Av , Min:99 , Max:116     Diastolic (24hrs), Av, Min:64, Max:71    SpO2  Av.3 %  Min: 92 %  Max: 98 %    I/O last 3 completed shifts:  In: 1470 [P.O.:320; I.V.:1100; IV Piggyback:50]  Out: 800 [Urine:800]      General: In no acute distress, alert and oriented x4  Cardiovascular: Warm throughout, no edema  Respiratory: no respiratory distress, equal chest rise  Abdomen: soft, mildly distended, appropriately tender to palpation around incisions which are c/d/i.  Some increased right abdominal tenderness  Skin: no obvious rashes or lesions appreciated, no jaundice  Extremities: atraumatic, no focal motor deficits, no open wounds      CBC  Recent Labs     25  0340 25  0253 25  0250   WBC 9.7 9.0 7.0   RBC 3.91 3.71 3.54   HGB 11.2* 10.8* 10.1*   HCT 35.2 33.2* 32.4*   MCV 90.0 89.5 91.5   MCH 28.6 29.1 28.5   MCHC 31.8* 32.5 31.2*   RDW 17.1* 17.1* 17.3*     --  326   MPV 8.6 8.9 8.8       CMP  Recent Labs     25  0340 25  0253 25  0250    137 137   K 4.5 4.7 4.4    105 105   CO2 24 21* 26   BUN 9 19 16   CREATININE 0.9 0.9 0.8   GLUCOSE 203* 136* 156*   CALCIUM 8.7 9.1 8.9   BILITOT 0.3 0.2 0.3   ALKPHOS 78 69 64   AST 16 17 9   ALT 42* 28 21         Assessment/Plan:    Patient Active Problem List 
GENERAL SURGERY  DAILY PROGRESS NOTE    Patient's Name/Date of Birth: Velma Mazariegos / 1965    Date: 2025      Subjective:  No acute issues overnight.  Patient mildly improving.  Passing gas, no bowel movements.  Strickland was removed yesterday without any issues, patient is been making good urine output since Strickland's been removed      Objective:  Last 24Hrs  Temp  Av °F (36.7 °C)  Min: 97.7 °F (36.5 °C)  Max: 98.2 °F (36.8 °C)  Resp  Av.6  Min: 18  Max: 20  Pulse  Av.3  Min: 69  Max: 90  Systolic (24hrs), Av , Min:121 , Max:150     Diastolic (24hrs), Av, Min:67, Max:81    SpO2  Av.3 %  Min: 95 %  Max: 96 %    I/O last 3 completed shifts:  In: 240 [P.O.:240]  Out: 775 [Urine:775]      General: In no acute distress, alert and oriented x4  Cardiovascular: Warm throughout, no edema  Respiratory: no respiratory distress, equal chest rise  Abdomen: soft, non-distended, appropriately tender to palpation around incisions which are c/d/i  Skin: no obvious rashes or lesions appreciated, no jaundice  Extremities: atraumatic, no focal motor deficits, no open wounds      CBC  Recent Labs     25  0340 25  0253   WBC 9.7 9.0   RBC 3.91 3.71   HGB 11.2* 10.8*   HCT 35.2 33.2*   MCV 90.0 89.5   MCH 28.6 29.1   MCHC 31.8* 32.5   RDW 17.1* 17.1*     --    MPV 8.6 8.9       CMP  Recent Labs     25  0340 25  0253    137   K 4.5 4.7    105   CO2 24 21*   BUN 9 19   CREATININE 0.9 0.9   GLUCOSE 203* 136*   CALCIUM 8.7 9.1   BILITOT 0.3 0.2   ALKPHOS 78 69   AST 16 17   ALT 42* 28         Assessment/Plan:    Patient Active Problem List   Diagnosis    Asthma exacerbation    Nicotine addiction    Left ankle sprain    Diverticulitis of colon    Abscess    Right lower quadrant abdominal pain    Colonic diverticular abscess    Decubitus ulcer of right buttock, stage 3 (HCC)    Diabetes mellitus type 2, noninsulin dependent (HCC)    Diverticulitis of large 
GENERAL SURGERY  DAILY PROGRESS NOTE    Patient's Name/Date of Birth: Velma Mazariegos / 1965    Date: 2025      Subjective:  Patient doing well, no acute events overnight. No nausea or vomiting.     Objective:  Last 24Hrs  Temp  Av.5 °F (36.9 °C)  Min: 98.1 °F (36.7 °C)  Max: 99.3 °F (37.4 °C)  Resp  Av.7  Min: 17  Max: 18  Pulse  Av  Min: 84  Max: 97  Systolic (24hrs), Av , Min:124 , Max:134     Diastolic (24hrs), Av, Min:69, Max:77    SpO2  Av.3 %  Min: 94 %  Max: 97 %    I/O last 3 completed shifts:  In: 300 [P.O.:300]  Out: 500 [Urine:400; Stool:100]      General: In no acute distress, alert and oriented x4  Cardiovascular: Warm throughout, no edema  Respiratory: no respiratory distress, equal chest rise, on room air  Abdomen: soft, minimally distended. Appropriately tender to palpation around midline incision which is c/d/I - retention sutures and packing in place, no strikethrough on dressing. Ostomy patent, gas in bag, 25 cc dark stool. Eczema in pannus improving  Skin: no obvious rashes or lesions appreciated, no jaundice  Extremities: atraumatic, no focal motor deficits, no open wounds. Several eczematous lesions present BLE.       CBC  Recent Labs     25  0439 25  0410   WBC 9.8 9.1 10.9   RBC 3.51 3.37* 3.48*   HGB 10.0* 9.5* 9.6*   HCT 31.6* 31.3* 31.7*   MCV 90.0 92.9 91.1   MCH 28.5 28.2 27.6   MCHC 31.6* 30.4* 30.3*   RDW 17.2* 17.1* 17.1*   * 743* 798*   MPV 8.6 8.8 9.1       CMP  Recent Labs     25  0749 25  0423 01/13/25  043    141 138   K 3.4* 3.4* 3.5    105 99   CO2 27 28 29   BUN 9 10 8   CREATININE 0.5 0.6 0.7   GLUCOSE 120* 144* 124*   CALCIUM 8.9 8.4* 8.7   BILITOT 0.2 0.2 0.2   ALKPHOS 127* 92 95   AST 11 9 11   ALT 12 10 9         Assessment/Plan:    Patient Active Problem List   Diagnosis    Asthma exacerbation    Nicotine addiction    Left ankle sprain    Diverticulitis of colon 
GENERAL SURGERY  DAILY PROGRESS NOTE    Patient's Name/Date of Birth: Velma Mazariegos / 1965    Date: 2025      Subjective:  Patient feeling better this morning, asking for more food. Denies any nausea or vomiting. Has been up and ambulating.     Objective:  Last 24Hrs  Temp  Av.2 °F (36.8 °C)  Min: 98.2 °F (36.8 °C)  Max: 98.2 °F (36.8 °C)  Resp  Av.7  Min: 16  Max: 20  Pulse  Av  Min: 87  Max: 89  Systolic (24hrs), Av , Min:122 , Max:142     Diastolic (24hrs), Av, Min:71, Max:89    SpO2  Av.5 %  Min: 95 %  Max: 96 %    I/O last 3 completed shifts:  In: 6520.6 [P.O.:1100; I.V.:5238.8; IV Piggyback:181.9]  Out: 1310 [Urine:675; Drains:530; Stool:5; Blood:100]      General: In no acute distress, alert and oriented x4  Cardiovascular: Warm throughout, no edema  Respiratory: no respiratory distress, equal chest rise. Minimally increased work of breathing on 5L NC  Abdomen: soft, minimally distended. Appropriately tender to palpation around midline incision which is c/d/I - retention sutures and packing in place, no strikethrough on dressing. SOLOMON w/ 285 cc/24 hr dark output. Ostomy dark but patent, gas in bag, 25 cc dark stool present. Eczema in pannus improving  Skin: no obvious rashes or lesions appreciated, no jaundice  Extremities: atraumatic, no focal motor deficits, no open wounds. Several eczematous lesions present BLE.       CBC  Recent Labs     25  0349 25  0328 25  0348   WBC 5.8 6.1 8.6   RBC 3.82 3.76 3.47*   HGB 11.1* 10.8* 9.9*   HCT 35.0 34.5 31.7*   MCV 91.6 91.8 91.4   MCH 29.1 28.7 28.5   MCHC 31.7* 31.3* 31.2*   RDW 17.0* 17.0* 17.1*    427 468*   MPV 8.9 9.1 9.1       CMP  Recent Labs     25  0349 25  0328 25  034    139 137   K 4.3 4.6 4.2    105 105   CO2 25 22 23   BUN 16 17 26*   CREATININE 0.7 0.7 1.0   GLUCOSE 117* 165* 143*   CALCIUM 9.6 9.2 9.2   BILITOT 0.4 0.2 0.2   ALKPHOS 90 91 89   AST 
GENERAL SURGERY  DAILY PROGRESS NOTE    Patient's Name/Date of Birth: Velma Mazariegos / 1965    Date: 2025      Subjective:  Patient just had BM prior to rounds. Continues to pass flatus. States she is having consistent abdominal pain that has not really improved but denies any nausea or vomiting.       Objective:  Last 24Hrs  Temp  Av.3 °F (36.8 °C)  Min: 98.2 °F (36.8 °C)  Max: 98.4 °F (36.9 °C)  Resp  Av.7  Min: 16  Max: 22  Pulse  Av  Min: 92  Max: 92  Systolic (24hrs), Av , Min:101 , Max:119     Diastolic (24hrs), Av, Min:61, Max:71    SpO2  Av %  Min: 98 %  Max: 98 %    I/O last 3 completed shifts:  In: 3600 [P.O.:1200; I.V.:2300; IV Piggyback:100]  Out: 2100 [Urine:2100]      General: In no acute distress, alert and oriented x4  Cardiovascular: Warm throughout, no edema  Respiratory: no respiratory distress, equal chest rise  Abdomen: soft, minimally distended, appropriately tender to palpation around incisions which are c/d/i.  Skin: no obvious rashes or lesions appreciated, no jaundice  Extremities: atraumatic, no focal motor deficits, no open wounds      CBC  Recent Labs     25  02525  0349   WBC 9.0 7.0 5.8   RBC 3.71 3.54 3.82   HGB 10.8* 10.1* 11.1*   HCT 33.2* 32.4* 35.0   MCV 89.5 91.5 91.6   MCH 29.1 28.5 29.1   MCHC 32.5 31.2* 31.7*   RDW 17.1* 17.3* 17.0*   PLT  --  326 363   MPV 8.9 8.8 8.9       CMP  Recent Labs     25  0253 01/05/25  0250 25  0349    137 138   K 4.7 4.4 4.3    105 104   CO2 21* 26 25   BUN 19 16 16   CREATININE 0.9 0.8 0.7   GLUCOSE 136* 156* 117*   CALCIUM 9.1 8.9 9.6   BILITOT 0.2 0.3 0.4   ALKPHOS 69 64 90   AST 17 9 10   ALT 28 21 17         Assessment/Plan:    Patient Active Problem List   Diagnosis    Asthma exacerbation    Nicotine addiction    Left ankle sprain    Diverticulitis of colon    Abscess    Right lower quadrant abdominal pain    Colonic diverticular abscess    
GENERAL SURGERY  DAILY PROGRESS NOTE    Patient's Name/Date of Birth: Velma Mazariegos / 1965    Date: 2025      Subjective:  Patient returned to OR yesterday for anastomotic leak. States she is actually feeling better than yesterday. Already up in chair. Having some belching with movement. No gas or stool in bag yet.     Objective:  Last 24Hrs  Temp  Av.2 °F (36.8 °C)  Min: 97.3 °F (36.3 °C)  Max: 99 °F (37.2 °C)  Resp  Av.2  Min: 16  Max: 26  Pulse  Av.6  Min: 95  Max: 117  Systolic (24hrs), Av , Min:108 , Max:161     Diastolic (24hrs), Av, Min:59, Max:116    SpO2  Av.5 %  Min: 93 %  Max: 99 %    I/O last 3 completed shifts:  In: 4870 [P.O.:1120; I.V.:3700; IV Piggyback:50]  Out: 1965 [Urine:1775; Drains:90; Blood:100]      General: In no acute distress, alert and oriented x4  Cardiovascular: Warm throughout, no edema  Respiratory: no respiratory distress, equal chest rise. Minimally increased work of breathing on 5L NC  Abdomen: soft, minimally distended. Appropriately tender to palpation around midline incision which is c/d/I - retention sutures and packing in place, no strikethrough on dressing. SOLOMON w/ 310 cc/24 hr SS output. Ostomy dark but patent, minimal sweat in bag, no gas. Eczema in pannus improving  Skin: no obvious rashes or lesions appreciated, no jaundice  Extremities: atraumatic, no focal motor deficits, no open wounds. Several eczematous lesions present BLE.       CBC  Recent Labs     25  0250 25  0349 25  0328   WBC 7.0 5.8 6.1   RBC 3.54 3.82 3.76   HGB 10.1* 11.1* 10.8*   HCT 32.4* 35.0 34.5   MCV 91.5 91.6 91.8   MCH 28.5 29.1 28.7   MCHC 31.2* 31.7* 31.3*   RDW 17.3* 17.0* 17.0*    363 427   MPV 8.8 8.9 9.1       CMP  Recent Labs     25  0250 25  0349 25  0328    138 139   K 4.4 4.3 4.6    104 105   CO2 26 25 22   BUN 16 16 17   CREATININE 0.8 0.7 0.7   GLUCOSE 156* 117* 165*   CALCIUM 8.9 9.6 
GENERAL SURGERY  DAILY PROGRESS NOTE    Patient's Name/Date of Birth: Velma Mazariegos / 1965    Date: 2025      Subjective:  Patient was up and walking halls with family yesterday. Feeling much better after getting cleaned up last night. No nausea or vomiting, tolerated regular diet.     Objective:  Last 24Hrs  Temp  Av.1 °F (36.7 °C)  Min: 98.1 °F (36.7 °C)  Max: 98.1 °F (36.7 °C)  Resp  Av.4  Min: 16  Max: 18  Pulse  Av  Min: 97  Max: 97  Systolic (24hrs), Av , Min:152 , Max:152     Diastolic (24hrs), Av, Min:82, Max:82    SpO2  Av.3 %  Min: 94 %  Max: 97 %    I/O last 3 completed shifts:  In: 1960 [P.O.:1460; I.V.:500]  Out: 1645 [Urine:1300; Drains:320; Stool:25]      General: In no acute distress, alert and oriented x4  Cardiovascular: Warm throughout, no edema  Respiratory: no respiratory distress, equal chest rise, on room air  Abdomen: soft, minimally distended. Appropriately tender to palpation around midline incision which is c/d/I - retention sutures and packing in place, no strikethrough on dressing. SOLOMON w/ 65 cc/24 hr SS. Ostomy dark but patent, gas in bag, no stool. Eczema in pannus improving  Skin: no obvious rashes or lesions appreciated, no jaundice  Extremities: atraumatic, no focal motor deficits, no open wounds. Several eczematous lesions present BLE.       CBC  Recent Labs     25  0328 25  0348 25  0418   WBC 6.1 8.6 8.3   RBC 3.76 3.47* 3.52   HGB 10.8* 9.9* 9.8*   HCT 34.5 31.7* 32.5*   MCV 91.8 91.4 92.3   MCH 28.7 28.5 27.8   MCHC 31.3* 31.2* 30.2*   RDW 17.0* 17.1* 17.3*    468* 468*   MPV 9.1 9.1 9.1       CMP  Recent Labs     25  0328 25  0348 25  0418    137 140   K 4.6 4.2 3.9    105 106   CO2 22 23 23   BUN 17 26* 21   CREATININE 0.7 1.0 0.7   GLUCOSE 165* 143* 144*   CALCIUM 9.2 9.2 9.0   BILITOT 0.2 0.2 0.2   ALKPHOS 91 89 92   AST 15 9 12   ALT 22 17 15 
GENERAL SURGERY  DAILY PROGRESS NOTE    Patient's Name/Date of Birth: Velma Mazariegos / 1965    Date: January 10, 2025      Subjective:  Patient up and walking around this morning. Minimal pain that is well controlled. No nausea or vomiting.     Objective:  Last 24Hrs  Temp  Av.7 °F (37.1 °C)  Min: 98.6 °F (37 °C)  Max: 98.7 °F (37.1 °C)  Resp  Av.2  Min: 16  Max: 18  Pulse  Av  Min: 85  Max: 87  Systolic (24hrs), Av , Min:132 , Max:143     Diastolic (24hrs), Av, Min:80, Max:84    SpO2  Av.5 %  Min: 96 %  Max: 97 %    I/O last 3 completed shifts:  In: 960 [P.O.:960]  Out: 465 [Urine:350; Drains:115]      General: In no acute distress, alert and oriented x4  Cardiovascular: Warm throughout, no edema  Respiratory: no respiratory distress, equal chest rise, on room air  Abdomen: soft, minimally distended. Appropriately tender to palpation around midline incision which is c/d/I - retention sutures and packing in place, no strikethrough on dressing. SOLOMON w/ 70 cc/24 hr SS. Ostomy dark but patent, gas in bag, 100 cc dark stool. Eczema in pannus improving  Skin: no obvious rashes or lesions appreciated, no jaundice  Extremities: atraumatic, no focal motor deficits, no open wounds. Several eczematous lesions present BLE.       CBC  Recent Labs     01/08/25  0348 01/09/25  0418 01/10/25  0335   WBC 8.6 8.3 11.7*   RBC 3.47* 3.52 3.70   HGB 9.9* 9.8* 10.5*   HCT 31.7* 32.5* 34.8   MCV 91.4 92.3 94.1   MCH 28.5 27.8 28.4   MCHC 31.2* 30.2* 30.2*   RDW 17.1* 17.3* 17.2*   * 468* 537*   MPV 9.1 9.1 9.1       CMP  Recent Labs     01/08/25  0348 01/09/25  0418 01/10/25  0335    140 142   K 4.2 3.9 4.1    106 106   CO2 23 23 25   BUN 26* 21 13   CREATININE 1.0 0.7 0.6   GLUCOSE 143* 144* 121*   CALCIUM 9.2 9.0 8.8   BILITOT 0.2 0.2 0.3   ALKPHOS 89 92 130*   AST 9 12 17   ALT 17 15 16         Assessment/Plan:    Patient Active Problem List   Diagnosis    Asthma exacerbation    
GENERAL SURGERY  DAILY PROGRESS NOTE    Patient's Name/Date of Birth: Velma Mazariegos / 1965    Date: January 3, 2025      Subjective:  No issues overnight. Mildly uncomfortable but no overt pain. Feeling hungry. Denies any flatus or BM.       Objective:  Last 24Hrs  Temp  Av °F (36.7 °C)  Min: 97.5 °F (36.4 °C)  Max: 98.8 °F (37.1 °C)  Resp  Avg: 15.4  Min: 6  Max: 19  Pulse  Av.7  Min: 75  Max: 105  Systolic (24hrs), Av , Min:100 , Max:150     Diastolic (24hrs), Av, Min:65, Max:92    SpO2  Av.4 %  Min: 92 %  Max: 100 %    I/O last 3 completed shifts:  In: 2660 [P.O.:10; I.V.:2650]  Out: 750 [Urine:700; Blood:50]      General: In no acute distress, alert and oriented x4  Cardiovascular: Warm throughout, no edema  Respiratory: no respiratory distress, equal chest rise  Abdomen: soft, non-distended, appropriately tender to palpation around incisions which are c/d/i  Skin: no obvious rashes or lesions appreciated, no jaundice  Extremities: atraumatic, no focal motor deficits, no open wounds      CBC  Recent Labs     25  0340   WBC 9.7   RBC 3.91   HGB 11.2*   HCT 35.2   MCV 90.0   MCH 28.6   MCHC 31.8*   RDW 17.1*      MPV 8.6       CMP  Recent Labs     25  0340      K 4.5      CO2 24   BUN 9   CREATININE 0.9   GLUCOSE 203*   CALCIUM 8.7   BILITOT 0.3   ALKPHOS 78   AST 16   ALT 42*         Assessment/Plan:    Patient Active Problem List   Diagnosis    Asthma exacerbation    Nicotine addiction    Left ankle sprain    Diverticulitis of colon    Abscess    Right lower quadrant abdominal pain    Colonic diverticular abscess    Decubitus ulcer of right buttock, stage 3 (HCC)    Diabetes mellitus type 2, noninsulin dependent (HCC)    Diverticulitis of large intestine with perforation and abscess    Colocutaneous fistula    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)    Diverticulitis large intestine w/o perforation or abscess 
General Surgery Progress Note    Subjective: Patient returned to the OR on 25 for an anastomotic leak. Patient states abdominal pain has improved.  No nausea and vomiting. Patient is passing flatus through bag, no stool.    OBJECTIVE      Physical    VITALS:  /64   Pulse 95   Temp 99 °F (37.2 °C) (Oral)   Resp 18   Ht 1.6 m (5' 3\")   Wt 97.6 kg (215 lb 1.6 oz)   SpO2 96%   BMI 38.10 kg/m²   INTAKE/OUTPUT:    Intake/Output Summary (Last 24 hours) at 2025 0810  Last data filed at 2025 0554  Gross per 24 hour   Intake 5160.62 ml   Output 885 ml   Net 4275.62 ml     URINARY CATHETER OUTPUT (Strickland):   [  DRAIN/TUBE OUTPUT:   [  TEMPERATURE:  Current - Temp: 99 °F (37.2 °C); Max - Temp  Av.3 °F (36.8 °C)  Min: 97.3 °F (36.3 °C)  Max: 99 °F (37.2 °C)  RESPIRATIONS RANGE: Resp  Av.3  Min: 16  Max: 26  PULSE RANGE: Pulse  Av.3  Min: 95  Max: 117  BLOOD PRESSURE RANGE:  Systolic (24hrs), Av , Min:108 , Max:161   ; Diastolic (24hrs), Av, Min:59, Max:116    PULSE OXIMETRY RANGE: SpO2  Av.4 %  Min: 93 %  Max: 98 %    General appearance: appears in no distress  Lungs:CTABL  Heart: RRR  Abdomen: soft, mildly distended, appropriately tener to palpation around midline incision which is clean, dry and intact. Retention sutures and packing in place, no drainage on dressing. SOLOMON drain with 310 cc serosanguinous output, ostomy dark but patent, minimum output in bag, passing flatus.   Extremities:no peripheral edema    Data  CBC with Differential:    Lab Results   Component Value Date/Time    WBC 6.1 2025 03:28 AM    RBC 3.76 2025 03:28 AM    HGB 10.8 2025 03:28 AM    HCT 34.5 2025 03:28 AM     2025 03:28 AM    MCV 91.8 2025 03:28 AM    MCH 28.7 2025 03:28 AM    MCHC 31.3 2025 03:28 AM    RDW 17.0 2025 03:28 AM    METASPCT 1 2025 03:28 AM    LYMPHOPCT 0 2025 03:28 AM    MONOPCT 6 2025 03:28 AM    EOSPCT 1 
Internal Medicine Consult Note    [facility]    Paul Avery D.O., RICHOMarkellI.                    Brant Tian D.O., RICHOMarkellI.                             Marc Castrejon D.O.     Mili Hays, MSN, APRN, NP-C  Filemon Rosales, MSN, APRN-CNP  Remington Forte, MSN, APRN-CNP  Christin Mary, MSN APRN-CNP  Xochitl Bach, MSN. APRN-NP-C     Primary Care Physician: Niall Oconnor MD   Admitting Physician:  Juan David Carranza MD  Admission date and time: 1/2/2025  7:13 AM    Room:  [address] [place], [place] [address]  Admitting diagnosis: Diverticulitis of large intestine with perforation and abscess [K57.20]  Colocutaneous fistula [K63.2]  Diverticulitis large intestine w/o perforation or abscess w/o bleeding [K57.32]    Patient Name: Velma Mazariegos  MRN: [id number]    Date of Service: 1/5/2025     Subjective:  Velma is a 59 y.o. female who was seen and examined today,1/5/2025, at the bedside.  Patient seem to be doing better than she was yesterday.  Repeat CT imaging at the time was performed.  The patient pain seems much improved today and she is weaned off oxygen.  She is resting comfortably in bed.  The patient also been ambulatory.  Diet will be progressed as tolerated.  The patient is passing flatus.    No family present during my examination.    Review of System:   Constitutional:   Denies fever or chills, weight loss or gain, fatigue or malaise.  HEENT:   Denies ear pain, sore throat, sinus or eye problems.  Cardiovascular:   Denies any chest pain, irregular heartbeats, or palpitations.   Respiratory:   Denies shortness of breath, coughing, sputum production, hemoptysis, or wheezing.  Gastrointestinal:   Improved abdominal pain.  Postsurgical findings  Genitourinary:    Denies any urgency, frequency, hematuria. Voiding  without difficulty.  Extremities:   Denies lower extremity swelling, edema or cyanosis.   Neurology:    Denies any headache or focal neurological deficits, Denies generalized 
Internal Medicine Consult Note    [facility]    Paul Avery D.O., RICHOMarkellI.                    Brant Tian D.O., RICHOMarkellI.                             Marc Castrejon D.O.     Mili Hays, MSN, APRN, NP-C  Filemon Rosales, MSN, APRN-CNP  Remington Forte, MSN, APRN-CNP  Christin Mary, MSN APRN-CNP  Xochitl Bach, MSN. APRN-NP-C     Primary Care Physician: Niall Oconnor MD   Admitting Physician:  Juan David Carranza MD  Admission date and time: 1/2/2025  7:13 AM    Room:  [address] [place], [place] [address]  Admitting diagnosis: Diverticulitis of large intestine with perforation and abscess [K57.20]  Colocutaneous fistula [K63.2]  Diverticulitis large intestine w/o perforation or abscess w/o bleeding [K57.32]    Patient Name: Velma Mazariegos  MRN: [id number]    Date of Service: 1/12/2025     Subjective:  Velma is a 59 y.o. female who was seen and examined today,1/12/2025, at the bedside.  Continues to have some abdominal bloating and discomfort but is having consistent ostomy output.  We continue to encourage increased activity and ambulation to promote good gut motility.  General surgery and infectious disease recommendations have been noted and the patient is pending skilled nursing facility placement at this time.  No additional symptoms or concerns. No family member present      Review of System:   Constitutional:   Denies fever or chills, weight loss or gain, +improving malaise and fatigue.  HEENT:   Denies ear pain, sore throat, sinus or eye problems.  Cardiovascular:   Denies any chest pain, irregular heartbeats, or palpitations.   Respiratory:   Denies shortness of breath, coughing, sputum production, hemoptysis, or wheezing.  Gastrointestinal:   Well controlled post-op abdominal pain, tolerating a diet, having good output in colostomy.  No significant output from the drain in place  Genitourinary:    Denies any urgency, frequency, hematuria. Voiding without difficulty  Extremities: 
Internal Medicine Consult Note    [facility]    Paul Avery D.O., RICHOMarkellI.                    Brant Tian D.O., RICHOMarkellI.                             Marc Castrejon D.O.     Mili Hays, MSN, APRN, NP-C  Filemon Rosales, MSN, APRN-CNP  Remington Forte, MSN, APRN-CNP  Christin Mary, MSN APRN-CNP  Xochitl Bach, MSN. APRN-NP-C     Primary Care Physician: Niall Oconnor MD   Admitting Physician:  Juan David Carranza MD  Admission date and time: 1/2/2025  7:13 AM    Room:  [address] [place], [place] [address]  Admitting diagnosis: Diverticulitis of large intestine with perforation and abscess [K57.20]  Colocutaneous fistula [K63.2]  Diverticulitis large intestine w/o perforation or abscess w/o bleeding [K57.32]    Patient Name: Velma Mazariegos  MRN: [id number]    Date of Service: 1/7/2025     Subjective:  Velma is a 59 y.o. female who was seen and examined today,1/7/2025, at the bedside.  Velma underwent repeat surgical intervention yesterday with partial colon resection, drainage of pelvic abscess, and end colostomy creation.  She describes postoperative pain this morning as to be expected.    Review of System:   Constitutional:   Denies fever or chills, weight loss or gain, fatigue or malaise.  HEENT:   Denies ear pain, sore throat, sinus or eye problems.  Cardiovascular:   Denies any chest pain, irregular heartbeats, or palpitations.   Respiratory:   Denies shortness of breath, coughing, sputum production, hemoptysis, or wheezing.  Gastrointestinal:   Abdominal pain as to be expected in the setting of recent surgery.  Colostomy is in place with minimal output.  SOLOMON drain is in place.  Genitourinary:    Denies any urgency, frequency, hematuria. Voiding with the use of a Strickland catheter  Extremities:   Denies lower extremity swelling, edema or cyanosis.   Neurology:    Denies any headache or focal neurological deficits, Denies generalized weakness or memory difficulty.   Psch:   Denies being 
Internal Medicine Consult Note    [facility]    Paul Avery D.O., RICHOMarkellI.                    Brant Tian D.O., RICHOMarkellI.                             Marc Castrejon D.O.     Mili Hays, MSN, APRN, NP-C  Filemon Rosales, MSN, APRN-CNP  Remington Forte, MSN, APRN-CNP  Christin Mary, MSN APRN-CNP  Xochitl Bach, MSN. APRN-NP-C     Primary Care Physician: Niall Oconnor MD   Admitting Physician:  Juan David Carranza MD  Admission date and time: 1/2/2025  7:13 AM    Room:  [address] [place], [place] [address]  Admitting diagnosis: Diverticulitis of large intestine with perforation and abscess [K57.20]  Colocutaneous fistula [K63.2]  Diverticulitis large intestine w/o perforation or abscess w/o bleeding [K57.32]    Patient Name: Velma Mazariegos  MRN: [id number]    Date of Service: 1/13/2025     Subjective:  Velma is a 59 y.o. female who was seen and examined today,1/13/2025, at the bedside.  Velma seems to be resting comfortably with appropriate stool output.  She admits to an increasing appetite but continues to have trouble with ingestion.  She has become more ambulatory.  We are awaiting precertification for transfer to the skilled nursing facility.      Review of System:   Constitutional:   Denies fever or chills, weight loss or gain, +improving malaise and fatigue.  HEENT:   Denies ear pain, sore throat, sinus or eye problems.  Cardiovascular:   Denies any chest pain, irregular heartbeats, or palpitations.   Respiratory:   Denies shortness of breath, coughing, sputum production, hemoptysis, or wheezing.  Gastrointestinal:   Well controlled post-op abdominal pain, tolerating a diet, having good output in colostomy.  No significant output from the drain in place  Genitourinary:    Denies any urgency, frequency, hematuria. Voiding without difficulty  Extremities:   Denies lower extremity swelling, edema or cyanosis.   Neurology:    Denies any headache or focal neurological deficits, Denies 
Internal Medicine Consult Note    [facility]    Paul Avery D.O., RICHOMarkellI.                    Brant Tian D.O., RICHOaMrkellI.                             Marc Castrejon D.O.     Mili Hays, MSN, APRN, NP-C  Filemon Rosales, MSN, APRN-CNP  Remington Forte, MSN, APRN-CNP  Christin Mary, MSN APRN-CNP  Xochitl Bach, MSN. APRN-NP-C     Primary Care Physician: Niall Oconnor MD   Admitting Physician:  Juan David Carranza MD  Admission date and time: 1/2/2025  7:13 AM    Room:  [address] [place], [place] [address]  Admitting diagnosis: Diverticulitis of large intestine with perforation and abscess [K57.20]  Colocutaneous fistula [K63.2]  Diverticulitis large intestine w/o perforation or abscess w/o bleeding [K57.32]    Patient Name: Velma Mazariegos  MRN: [id number]    Date of Service: 1/8/2025     Subjective:  Velma is a 59 y.o. female who was seen and examined today,1/8/2025, at the bedside.  Patient resting comfortably at the present time.  The patient pain does appear to be relatively controlled today.  The patient is resting comfortable without supplemental O2.  Patient does have stool in the ostomy bag.  Patient tolerating clear liquid diet and has been ambulatory.  Denies chest pain or shortness of breath    No family member present      Review of System:   Constitutional:   Denies fever or chills, weight loss or gain, fatigue or malaise.  HEENT:   Denies ear pain, sore throat, sinus or eye problems.  Cardiovascular:   Denies any chest pain, irregular heartbeats, or palpitations.   Respiratory:   Denies shortness of breath, coughing, sputum production, hemoptysis, or wheezing.  Gastrointestinal:   Abdominal pain as to be expected in the setting of recent surgery.  Colostomy is in place with minimal output.  SOLOMON drain is in place.  Genitourinary:    Denies any urgency, frequency, hematuria. Voiding with the use of a Strickland catheter  Extremities:   Denies lower extremity swelling, edema or cyanosis. 
Per P&T Committee and Medical Executive Committee approval, respiratory inhalers are nonformulary and interchanged to nebulized [facility] in [facility] unless the prescriber rejects the interchange and orders the inhaler as dispense as written (FRITZ).  FRITZ orders for inhalers will require the patient/caregiver to bring the nonformulary inhaler into the hospital from home.    Christin Zhang, PharmD, 1/4/202511:00 AM      
Physical Therapy Initial Evaluation/Plan of Care    Room #:  0314/0314-01  Patient Name: Velma Mazariegos  YOB: 1965  MRN: [id number]    Date of Service: 1/10/2025     Tentative placement recommendation: Subacute Rehab  Equipment recommendation: To be determined      Evaluating Physical Therapist: Lani Singleton, PT #[id number]      Specific Provider Orders/Date/Referring Provider :  01/10/25 1200    PT eval and treat  Start:  01/10/25 1200,   End:  01/10/25 1200,   ONE TIME,   Standing Count:  1 Occurrences,   R       Juan David Carranza MD    Admitting Diagnosis:   Diverticulitis of large intestine with perforation and abscess [K57.20]  Colocutaneous fistula [K63.2]  Diverticulitis large intestine w/o perforation or abscess w/o bleeding [K57.32]       Surgery:       [facility]  Operative Note        DATE OF PROCEDURE: 1/2/2025     PREOPERATIVE DIAGNOSIS:  Complicated diverticular disease with recurrent complicated diverticulitis. Colocutaneous fistula     POSTOPERATIVE DIAGNOSIS:  Complicated diverticular disease with recurrent complicated diverticulitis. Colocutaneous fistula     PROCEDURE PERFORMED:  Laparoscopic robotic-assisted low anterior resection with low pelvic anastomosis.  Complete mobilization of splenic flexure, open drainage of pelvic abscess    Date of Procedure: 1/6/2025     Pre-Op Diagnosis Codes: Feculent peritonitis, free intraperitoneal air     Post-Op Diagnosis: Colon anastomotic leak with feculent peritonitis intra-abdominal abscess       Procedure(s):  DIAGNOSTIC LAPAROSCOPY CONVERTED TO EXPLORATORY LAPAROTOMY, PARTIAL COLON RESECTION, DRAINAGE OF PELVIC ABSCESS, FLEXIBLE SIGMOIDOSCOPY, END COLOSTOMY, RETENTION SUTURES     Surgeon(s):  Juan David Carranza MD  Visit Diagnoses         Codes    Postoperative pain    -  Primary G89.18    Pain     R52            Patient Active Problem List   Diagnosis    Asthma exacerbation    Nicotine 
Physical Therapy Treatment Note/Plan of Care    Room #:  0314/0314-01  Patient Name: Velma Mazariegos  YOB: 1965  MRN: [id number]    Date of Service: 1/13/2025     Tentative placement recommendation: Subacute Rehab  Equipment recommendation: To be determined      Evaluating Physical Therapist: Lani Singleton, PT #[id number]      Specific Provider Orders/Date/Referring Provider :  01/10/25 1200    PT eval and treat  Start:  01/10/25 1200,   End:  01/10/25 1200,   ONE TIME,   Standing Count:  1 Occurrences,   R       Juan David Carranza MD    Admitting Diagnosis:   Diverticulitis of large intestine with perforation and abscess [K57.20]  Colocutaneous fistula [K63.2]  Diverticulitis large intestine w/o perforation or abscess w/o bleeding [K57.32]       Surgery:       [facility]  Operative Note        DATE OF PROCEDURE: 1/2/2025     PREOPERATIVE DIAGNOSIS:  Complicated diverticular disease with recurrent complicated diverticulitis. Colocutaneous fistula     POSTOPERATIVE DIAGNOSIS:  Complicated diverticular disease with recurrent complicated diverticulitis. Colocutaneous fistula     PROCEDURE PERFORMED:  Laparoscopic robotic-assisted low anterior resection with low pelvic anastomosis.  Complete mobilization of splenic flexure, open drainage of pelvic abscess    Date of Procedure: 1/6/2025     Pre-Op Diagnosis Codes: Feculent peritonitis, free intraperitoneal air     Post-Op Diagnosis: Colon anastomotic leak with feculent peritonitis intra-abdominal abscess       Procedure(s):  DIAGNOSTIC LAPAROSCOPY CONVERTED TO EXPLORATORY LAPAROTOMY, PARTIAL COLON RESECTION, DRAINAGE OF PELVIC ABSCESS, FLEXIBLE SIGMOIDOSCOPY, END COLOSTOMY, RETENTION SUTURES     Surgeon(s):  Juan David Carranza MD  Visit Diagnoses         Codes    Postoperative pain    -  Primary G89.18    Pain     R52            Patient Active Problem List   Diagnosis    Asthma exacerbation    Nicotine addiction 
[facility] [facility]  Progress Note    CC: pelvic abscess   Face to face encounter   SUBJECTIVE:  1/8/2025  Patient is in bed- still with abdominal tenderness.   On room air- has been afebrile.   Strickland to be removed today   Patient is tolerating medications. No reported adverse drug reactions.    Medications:  Scheduled Meds:   fluconazole  400 mg IntraVENous Q24H    miconazole   Topical BID    piperacillin-tazobactam  3,375 mg IntraVENous Q8H    methocarbamol  750 mg IntraVENous Q6H    dupilumab  300 mg SubCUTAneous Q14 Days    sodium chloride flush  5-40 mL IntraVENous 2 times per day    bisacodyl  5 mg Oral Daily    famotidine  20 mg Oral BID    Or    famotidine (PEPCID) injection  20 mg IntraVENous BID    enoxaparin  40 mg SubCUTAneous Daily    acetaminophen  650 mg Oral Q6H    DULoxetine  60 mg Oral Nightly    levothyroxine  88 mcg Oral Daily    montelukast  10 mg Oral Nightly    insulin lispro  0-8 Units SubCUTAneous 4x Daily AC & HS     Continuous Infusions:   sodium chloride      dextrose       PRN Meds:LORazepam, albuterol, morphine **OR** morphine, oxyCODONE **OR** oxyCODONE, sodium chloride flush, sodium chloride, ondansetron **OR** ondansetron, LORazepam, glucose, dextrose bolus **OR** dextrose bolus, glucagon (rDNA), dextrose  OBJECTIVE:  Patient Vitals for the past 24 hrs:   BP Temp Temp src Pulse Resp SpO2   01/08/25 1038 -- -- -- -- 16 --   01/08/25 0516 (!) 142/89 98.2 °F (36.8 °C) Oral 89 16 96 %   01/07/25 1700 122/71 98.2 °F (36.8 °C) Oral 87 16 95 %   01/07/25 1429 -- -- -- -- 20 --   01/07/25 1359 -- -- -- -- 20 --     Constitutional: The patient is awake, alert, and oriented. Sitting up in bed.  Skin: Warm and dry. No rashes were noted.   Head: Eyes show round, and reactive pupils. No jaundice.   Mouth: Moist mucous membranes, no ulcerations, no thrush.   Neck: Supple to movements. No lymphadenopathy.   Chest: No use of accessory muscles to breathe. Symmetrical 
Spiritual Health History and Assessment/Progress Note  [facility]    (P) Spiritual/Emotional Needs,  ,  ,      Name: Velma Mazariegos MRN: [id number]    Age: 59 y.o.     Sex: female   Language: English   Yarsanism: Baptism   Diverticulitis of large intestine with perforation and abscess     Date: 1/9/2025                           Spiritual Assessment began in [facility] MED SURG        Referral/Consult From: (P) Nurse   Encounter Overview/Reason: (P) Spiritual/Emotional Needs  Service Provided For: (P) Patient    Kari, Belief, Meaning:   Patient is connected with a kari tradition or spiritual practice  Family/Friends No family/friends present      Importance and Influence:  Patient has spiritual/personal beliefs that influence decisions regarding their health  Family/Friends No family/friends present    Community:  Patient is connected with a spiritual community  Family/Friends No family/friends present    Assessment and Plan of Care:     Patient Interventions include: Engaged in theological reflection  Family/Friends Interventions include: No family/friends present    Patient Plan of Care: Spiritual Care available upon further referral  Family/Friends Plan of Care: No family/friends present    Electronically signed by Chaplain Andrew on 1/9/2025 at 2:51 PM   
Spiritual Health History and Assessment/Progress Note  [facility]    (P) Spiritual/Emotional Needs,  ,  ,      Name: Velma Mazariegos MRN: [id number]    Age: 59 y.o.     Sex: female   Language: English   Jain: Voodoo   Diverticulitis of large intestine with perforation and abscess     Date: 1/7/2025                           Spiritual Assessment began in [facility] MED SURG        Referral/Consult From: (P) Nurse, Rounding   Encounter Overview/Reason: (P) Spiritual/Emotional Needs  Service Provided For: (P) Patient    Kari, Belief, Meaning:   Patient is connected with a kari tradition or spiritual practice  Family/Friends No family/friends present      Importance and Influence:  Patient has spiritual/personal beliefs that influence decisions regarding their health  Family/Friends No family/friends present    Community:  Patient feels well-supported. Support system includes: Children  Family/Friends No family/friends present    Assessment and Plan of Care:     Patient Interventions include: Facilitated expression of thoughts and feelings and Affirmed coping skills/support systems  Family/Friends Interventions include: No family/friends present    Patient Plan of Care: Spiritual Care available upon further referral  Family/Friends Plan of Care: No family/friends present    Electronically signed by Chaplain José on 1/7/2025 at 3:02 PM   
Spiritual Health History and Assessment/Progress Note  [facility]    (P) Spiritual/Emotional Needs,  ,  ,      Name: Velma Mazariegos MRN: [id number]    Age: 59 y.o.     Sex: female   Language: English   Adventism: Hinduism   Diverticulitis of large intestine with perforation and abscess     Date: 1/6/2025                           Spiritual Assessment began in [facility] MED SURG        Referral/Consult From: (P) Rounding   Encounter Overview/Reason: (P) Spiritual/Emotional Needs  Service Provided For: (P) Patient    Kari, Belief, Meaning:   Patient identifies as spiritual  Family/Friends No family/friends present      Importance and Influence:  Patient has spiritual/personal beliefs that influence decisions regarding their health  Family/Friends No family/friends present    Community:  Patient feels well-supported. Support system includes: Children  Family/Friends No family/friends present    Assessment and Plan of Care:     Patient Interventions include: Facilitated expression of thoughts and feelings and Affirmed coping skills/support systems  Family/Friends Interventions include: No family/friends present    Patient Plan of Care: Spiritual Care available upon further referral  Family/Friends Plan of Care: No family/friends present    Electronically signed by LIOR Hernandez on 1/6/2025 at 1:39 PM   
Spiritual Health History and Assessment/Progress Note  [facility]    (P) Spiritual/Emotional Needs,  ,  ,      Name: Velma Mazariegos MRN: [id number]    Age: 59 y.o.     Sex: female   Language: English   Protestant: Alevism   Diverticulitis of large intestine with perforation and abscess     Date: 1/14/2025                           Spiritual Assessment began in [facility] MED SURG        Referral/Consult From: (P) Rounding   Encounter Overview/Reason: (P) Spiritual/Emotional Needs  Service Provided For: (P) Family    Kari, Belief, Meaning:   Patient is connected with a kari tradition or spiritual practice  Family/Friends No family/friends present      Importance and Influence:  Patient has spiritual/personal beliefs that influence decisions regarding their health  Family/Friends No family/friends present    Community:  Patient feels well-supported. Support system includes: Children  Family/Friends No family/friends present    Assessment and Plan of Care:     Patient Interventions include: Facilitated expression of thoughts and feelings, Explored spiritual coping/struggle/distress, and Affirmed coping skills/support systems  Family/Friends Interventions include: No family/friends present    Patient Plan of Care: Spiritual Care available upon further referral  Family/Friends Plan of Care: No family/friends present    Electronically signed by Chaplain José on 1/14/2025 at 12:08 PM   
Spoke to Denise on unit. Pt is discharging to facility. Pharmacy will not be filling medications.   
    Constitutional: The patient is awake, alert, and oriented.    Skin: Warm and dry. No rashes were noted.   Head: at/nc   Neck: Supple to movements. No lymphadenopathy.   Chest: No use of accessory muscles to breathe. Symmetrical expansion. Auscultation reveals no wheezing, crackles, or rhonchi.   Cardiovascular: S1 and S2 are rhythmic and regular. No murmurs appreciated.   Abdomen: Positive bowel sounds to auscultation. Tender. Has ostomy with stools, SOLOMON drain noted with sanguinous drainage.  Abdominal area with dresing    Extremities: No clubbing, no cyanosis, some edema.  PIV  [place]     Laboratory and Tests Review:  Lab Results   Component Value Date    WBC 8.3 01/09/2025    WBC 8.6 01/08/2025    WBC 6.1 01/07/2025    HGB 9.8 (L) 01/09/2025    HCT 32.5 (L) 01/09/2025    MCV 92.3 01/09/2025     (H) 01/09/2025     Lab Results   Component Value Date    NEUTROABS 6.03 01/09/2025    NEUTROABS 7.63 (H) 01/08/2025    NEUTROABS 5.51 01/07/2025     Lab Results   Component Value Date    CRP 3.0 09/24/2024    CRP 9.0 (H) 09/18/2024    CRP 9.0 (H) 09/11/2024     Lab Results   Component Value Date    SEDRATE 6 09/24/2024    SEDRATE 36 (H) 09/11/2024     Lab Results   Component Value Date    ALT 15 01/09/2025    AST 12 01/09/2025    GGT 29 11/09/2024    ALKPHOS 92 01/09/2025    BILITOT 0.2 01/09/2025     Lab Results   Component Value Date/Time     01/09/2025 04:18 AM    K 3.9 01/09/2025 04:18 AM     01/09/2025 04:18 AM    CO2 23 01/09/2025 04:18 AM    BUN 21 01/09/2025 04:18 AM    CREATININE 0.7 01/09/2025 04:18 AM    LABGLOM >90 01/09/2025 04:18 AM    LABGLOM >60 10/24/2023 01:17 PM    GLUCOSE 144 01/09/2025 04:18 AM    GLUCOSE 130 03/02/2012 05:04 AM    CALCIUM 9.0 01/09/2025 04:18 AM    BILITOT 0.2 01/09/2025 04:18 AM    ALKPHOS 92 01/09/2025 04:18 AM    AST 12 01/09/2025 04:18 AM    ALT 15 01/09/2025 04:18 AM     Radiology:  Reviewed     Microbiology:   1/6/2025- Enterococcus Faecium and candida 
-- -- 18 --   01/10/25 1808 -- -- -- -- 16 --   01/10/25 1658 (!) 146/93 98.6 °F (37 °C) Oral 97 16 95 %   01/10/25 1517 -- -- -- -- 16 --     Constitutional: The patient is awake, alert, and oriented.  Sitting up in bed. Chair   Head: at/nc  Chest:  Symmetrical expansion. Auscultation reveals no wheezing, crackles, or rhonchi.   Cardiovascular: S1 and S2 are rhythmic and regular   Abdomen: Positive bowel sounds to auscultation. Tender. Has ostomy with stools, SOLOMON drain noted with sanguinous drainage.  Abdominal area with dressing    Extremities: No clubbing, no cyanosis, some edema.  Midline   [place]     Laboratory and Tests Review:  Lab Results   Component Value Date    WBC 11.4 01/11/2025    WBC 11.7 (H) 01/10/2025    WBC 8.3 01/09/2025    HGB 10.3 (L) 01/11/2025    HCT 32.7 (L) 01/11/2025    MCV 89.8 01/11/2025     (H) 01/11/2025     Lab Results   Component Value Date    NEUTROABS 8.90 (H) 01/11/2025    NEUTROABS 9.46 (H) 01/10/2025    NEUTROABS 6.03 01/09/2025     Lab Results   Component Value Date    CRP 3.0 09/24/2024    CRP 9.0 (H) 09/18/2024    CRP 9.0 (H) 09/11/2024     Lab Results   Component Value Date    SEDRATE 6 09/24/2024    SEDRATE 36 (H) 09/11/2024     Lab Results   Component Value Date    ALT 12 01/11/2025    AST 11 01/11/2025    GGT 29 11/09/2024    ALKPHOS 127 (H) 01/11/2025    BILITOT 0.2 01/11/2025     Lab Results   Component Value Date/Time     01/11/2025 07:49 AM    K 3.4 01/11/2025 07:49 AM     01/11/2025 07:49 AM    CO2 27 01/11/2025 07:49 AM    BUN 9 01/11/2025 07:49 AM    CREATININE 0.5 01/11/2025 07:49 AM    LABGLOM >90 01/11/2025 07:49 AM    LABGLOM >60 10/24/2023 01:17 PM    GLUCOSE 120 01/11/2025 07:49 AM    GLUCOSE 130 03/02/2012 05:04 AM    CALCIUM 8.9 01/11/2025 07:49 AM    BILITOT 0.2 01/11/2025 07:49 AM    ALKPHOS 127 01/11/2025 07:49 AM    AST 11 01/11/2025 07:49 AM    ALT 12 01/11/2025 07:49 AM     Radiology:  Reviewed     Microbiology:   1/6/2025- body- 
-- -- 18 --   01/12/25 2242 -- -- -- -- 16 --   01/12/25 2032 -- -- -- -- 18 --   01/12/25 1754 123/69 98.6 °F (37 °C) Oral 94 17 --     Constitutional: The patient is awake, alert, and oriented.  in bed.    Head: at/nc  Chest:   Auscultation reveals no wheezing, crackles, or rhonchi.   Cardiovascular: S1 and S2 are rhythmic and regular   Abdomen:  Has ostomy with stools, Abdominal area with dressing    Extremities: No clubbing, no cyanosis, edema.  Midline   [place]     Laboratory and Tests Review:  Lab Results   Component Value Date    WBC 9.1 01/13/2025    WBC 9.8 01/12/2025    WBC 11.4 01/11/2025    HGB 9.5 (L) 01/13/2025    HCT 31.3 (L) 01/13/2025    MCV 92.9 01/13/2025     (H) 01/13/2025     Lab Results   Component Value Date    NEUTROABS 6.46 01/13/2025    NEUTROABS 7.33 (H) 01/12/2025    NEUTROABS 8.90 (H) 01/11/2025     Lab Results   Component Value Date    CRP 3.0 09/24/2024    CRP 9.0 (H) 09/18/2024    CRP 9.0 (H) 09/11/2024     Lab Results   Component Value Date    SEDRATE 6 09/24/2024    SEDRATE 36 (H) 09/11/2024     Lab Results   Component Value Date    ALT 9 01/13/2025    AST 11 01/13/2025    GGT 29 11/09/2024    ALKPHOS 95 01/13/2025    BILITOT 0.2 01/13/2025     Lab Results   Component Value Date/Time     01/13/2025 04:39 AM    K 3.5 01/13/2025 04:39 AM    CL 99 01/13/2025 04:39 AM    CO2 29 01/13/2025 04:39 AM    BUN 8 01/13/2025 04:39 AM    CREATININE 0.7 01/13/2025 04:39 AM    LABGLOM >90 01/13/2025 04:39 AM    LABGLOM >60 10/24/2023 01:17 PM    GLUCOSE 124 01/13/2025 04:39 AM    GLUCOSE 130 03/02/2012 05:04 AM    CALCIUM 8.7 01/13/2025 04:39 AM    BILITOT 0.2 01/13/2025 04:39 AM    ALKPHOS 95 01/13/2025 04:39 AM    AST 11 01/13/2025 04:39 AM    ALT 9 01/13/2025 04:39 AM     Radiology:  Reviewed     Microbiology:   1/6/2025- body- fluid- Enterococcus Faecium- VRE and candida albicans    1/6/2025- fluid culture- no anaerobes - isolated- discussed with Microbiology 
depressed.  Musculoskeletal:    Denies  myalgias, joint complaints or back pain.   Integumentary:   Denies any rashes, ulcers, or excoriations.  Denies bruising.  Hematologic/Lymphatic:  Denies bruising or bleeding.    Physical Exam:  I/O this shift:  In: 240 [P.O.:240]  Out: 225 [Urine:225]    Intake/Output Summary (Last 24 hours) at 1/3/2025 1822  Last data filed at 1/3/2025 1514  Gross per 24 hour   Intake 240 ml   Output 475 ml   Net -235 ml   I/O last 3 completed shifts:  In: 2660 [P.O.:10; I.V.:2650]  Out: 1000 [Urine:950; Blood:50]  Patient Vitals for the past 96 hrs (Last 3 readings):   Weight   01/02/25 1343 97.6 kg (215 lb 1.6 oz)   01/02/25 0722 90.6 kg (199 lb 12.8 oz)     Vital Signs:   Blood pressure 121/67, pulse 69, temperature 98.2 °F (36.8 °C), temperature source Oral, resp. rate 20, height 1.6 m (5' 3\"), weight 97.6 kg (215 lb 1.6 oz), SpO2 96%.    General appearance:  Alert, responsive, oriented to person, place, and time. Well preserved, alert, no distress.  Head:  Normocephalic. No masses, lesions or tenderness.  Eyes:  PERRLA.  EOMI.  Sclera clear.  Buccal mucosa moist.  ENT:  Ears normal. Mucosa normal.  Neck:    Supple. Trachea midline. No thyromegaly. No JVD. No bruits.  Heart:    Rhythm regular. Rate controlled.  No murmurs.  Lungs:    Symmetrical. Clear to auscultation bilaterally.  No wheezes. No rhonchi. No rales.  Abdomen:   Obese.  Postsurgical findings.  Positive bowel sounds  Extremities:    Peripheral pulses present.  No peripheral edema.  No ulcers. No cyanosis. No clubbing.  Neurologic:    Alert x 3.  No focal deficit.  Cranial nerves grossly intact. No focal weakness.  Psych:   Behavior is normal. Mood appears normal. Speech is not rapid and/or pressured.  Musculoskeletal:   Spine ROM normal. Muscular strength intact. Gait not assessed.  Integumentary:  No rashes  Skin normal color and texture.  Genitalia/Breast:  Deferred    Medication:  Scheduled Meds:   ketorolac  30 mg 
depressed.  Musculoskeletal:    Denies  myalgias, joint complaints or back pain.   Integumentary:   Denies any rashes, ulcers, or excoriations.  Denies bruising.  Hematologic/Lymphatic:  Denies bruising or bleeding.    Physical Exam:  No intake/output data recorded.    Intake/Output Summary (Last 24 hours) at 1/9/2025 1921  Last data filed at 1/9/2025 1654  Gross per 24 hour   Intake 480 ml   Output 80 ml   Net 400 ml   I/O last 3 completed shifts:  In: 960 [P.O.:960]  Out: 465 [Urine:350; Drains:115]  No data found.    Vital Signs:   Blood pressure 132/80, pulse 85, temperature 98.6 °F (37 °C), temperature source Oral, resp. rate 16, height 1.6 m (5' 2.99\"), weight 97.6 kg (215 lb 1.6 oz), SpO2 96%.    General appearance:  Alert, responsive, oriented to person, place, and time. Well preserved, alert, no distress.  Head:  Normocephalic. No masses, lesions or tenderness.  Eyes:  PERRLA.  EOMI.  Sclera clear.  Buccal mucosa moist.  ENT:  Ears normal. Mucosa normal.  Neck:    Supple. Trachea midline. No thyromegaly. No JVD. No bruits.  Heart:    Rhythm regular. Rate controlled.  No murmurs.  Lungs:    Symmetrical. Clear to auscultation bilaterally.  No wheezes. No rhonchi. No rales.  Abdomen:   Postoperative dressings are in place.  SOLOMON drain is in place with serosanguineous output.  Colostomy is in place with minimal output.  Extremities:    Peripheral pulses present.  No peripheral edema.  No ulcers. No cyanosis. No clubbing.  Neurologic:    Alert x 3.  No focal deficit.  Cranial nerves grossly intact. No focal weakness.  Psych:   Behavior is normal. Mood appears normal. Speech is not rapid and/or pressured.  Musculoskeletal:   Spine ROM normal. Muscular strength intact. Gait not assessed.  Integumentary:  No rashes  Skin normal color and texture.  Genitalia/Breast:  Voiding with use of a Strickland catheter.    Medication:  Scheduled Meds:   DAPTOmycin (CUBICIN) 500 mg in sodium chloride (PF) 0.9 % 10 mL IV syringe  500 
CRP 9.0 (H) 09/18/2024    CRP 9.0 (H) 09/11/2024     Lab Results   Component Value Date    SEDRATE 6 09/24/2024    SEDRATE 36 (H) 09/11/2024     Lab Results   Component Value Date    ALT 10 01/14/2025    AST 12 01/14/2025    GGT 29 11/09/2024    ALKPHOS 108 (H) 01/14/2025    BILITOT 0.2 01/14/2025     Lab Results   Component Value Date/Time     01/14/2025 04:10 AM    K 3.7 01/14/2025 04:10 AM     01/14/2025 04:10 AM    CO2 28 01/14/2025 04:10 AM    BUN 7 01/14/2025 04:10 AM    CREATININE 0.6 01/14/2025 04:10 AM    LABGLOM >90 01/14/2025 04:10 AM    LABGLOM >60 10/24/2023 01:17 PM    GLUCOSE 120 01/14/2025 04:10 AM    GLUCOSE 130 03/02/2012 05:04 AM    CALCIUM 8.4 01/14/2025 04:10 AM    BILITOT 0.2 01/14/2025 04:10 AM    ALKPHOS 108 01/14/2025 04:10 AM    AST 12 01/14/2025 04:10 AM    ALT 10 01/14/2025 04:10 AM     Radiology:  Reviewed     Microbiology:   1/6/2025- body- fluid- Enterococcus Faecium- VRE and candida albicans    1/6/2025- fluid culture- no anaerobes - isolated- discussed with Microbiology     ASSESSMENT:  Pelvic abscess  Recurrent complicated diverticulitis   S/p DIAGNOSTIC LAPAROSCOPY CONVERTED TO EXPLORATORY LAPAROTOMY, PARTIAL COLON RESECTION, DRAINAGE OF PELVIC ABSCESS, FLEXIBLE SIGMOIDOSCOPY, END COLOSTOMY, RETENTION SUTURES   VRE Candida   Immunodepressed pt     PLAN:  Continue diflucan   Cont daptomycin   Last CK total -16  Surgery following   Continue wound care   Monitor labs-    F/u 2 weeks   Discharge planning - med rec done  Can f/u in [facility]  Discussed with pt     Please note that 30 minutes were spent on this case in regards to the intensity and complexity inherent to hospital inpatient or observation care associated with a confirmed or suspected infectious disease.  This included education to patient/representative and/for hospital staff in regards to disease transmission risk assessment and mitigation, public health investigation, analysis and testing, and complex antimicrobial 
  Wound Healing % 40 12/27/24 0849   Post-Procedure Length (cm) 0.3 cm 12/27/24 0931   Post-Procedure Width (cm) 0.7 cm 12/27/24 0931   Post-Procedure Depth (cm) 0.5 cm 12/27/24 0931   Post-Procedure Surface Area (cm^2) 0.21 cm^2 12/27/24 0931   Post-Procedure Volume (cm^3) 0.105 cm^3 12/27/24 0931   Tunneling Position ___ O'Clock 0.5 12/27/24 0849   Undermining Starts ___ O'Clock @3 12/27/24 0849   Wound Assessment Pink/red 01/03/25 0128   Drainage Amount None (dry) 01/03/25 0128   Drainage Description Serous;Yellow 01/02/25 1400   Odor None 01/03/25 0128   Laxmi-wound Assessment Fragile;Intact 01/02/25 1400   Number of days: 71       Assessment:    Complicated diverticular disease with recurrent complicated diverticulitis, colocutaneous fistula, with laparoscopic robotic assisted low anterior resection with low pelvic anastomosis.  Complete mobilization of splenic flexure, open drainage of pelvic abscess by Dr. Carranza on January 2, 2025.  Non-insulin-dependent diabetes mellitus with hemoglobin A1c 8.6  Thyroid disease currently on Synthroid  Asthma  Arthritis  Sleep apnea uses home CPAP  Obesity with elevated BMI of 38.10 kg/m²  Vitamin D deficiency      Plan:     Patient complaining of severe pain in the right lower quadrant  Discussed condition with surgery  Analgesics provided  Patient for repeat CT  Continue current treatment  Pending CT further recommendation    More than 50% of my  time was spent at the bedside counseling/coordinating care with the patient and/or family with face to face contact.  This time was spent reviewing notes and laboratory data as well as instructing and counseling the patient. Time I spent with the family or surrogate(s) is included only if the patient was incapable of providing the necessary information or participating in medical decisions. I also discussed the differential diagnosis and all of the proposed management plans with the patient and individuals accompanying the 
chair.  Transfer training with verbal cues for hand placement throughout session to improve safety.   Independent    Functional Mobility Supervision with no device to and from bathroom and up to bedside chair   Independent     Balance Sitting:     Static: Good    Dynamic: fair plus  Standing: fair plus with no device   Sitting:     Static: good     Dynamic: good   Standing: good  with no device    Activity Tolerance Fair   good    Visual/  Perceptual Glasses: no                Hand Dominance: right      AROM (PROM) Strength Additional Info:  Goal:   RUE  WFL 4/5 good  and wfl FMC/dexterity noted during ADL tasks   Improve overall RUE strength  for participation in functional tasks   LUE WFL 4/5 good  and wfl FMC/dexterity noted during ADL tasks   Improve overall LUE strength  for participation in functional tasks     Hearing: WFL   Sensation:  No c/o numbness or tingling  Tone: WFL   Edema:    Comments: Upon arrival the patient was seated EOB.  At end of session, patient was seated in bedside chair with call light and phone within reach, all lines and tubes intact.  Overall patient demonstrated decreased independence and safety during completion of ADL/functional transfer/mobility tasks.  Pt would benefit from continued skilled OT to increase safety and independence with completion of ADL/IADL tasks for functional independence and quality of life.    Treatment: OT treatment provided this date includes:   Instruction/training on safety and adapted techniques for completion of ADLs   Instruction/training on safe functional mobility/transfer techniques   Instruction/training on energy conservation/work simplification for completion of ADLs   Instruction/training on proper positioning/alignment to prevent contractures     Rehab Potential: Good for established goals.      Patient / Family Goal: go to rehab       Patient and/or family were instructed on functional diagnosis, prognosis/goals and OT plan of care. 
in Wound Size % (l*w) 76 12/27/24 0849   Wound Volume (cm^3) 0.06 cm^3 12/27/24 0849   Wound Healing % 40 12/27/24 0849   Post-Procedure Length (cm) 0.3 cm 12/27/24 0931   Post-Procedure Width (cm) 0.7 cm 12/27/24 0931   Post-Procedure Depth (cm) 0.5 cm 12/27/24 0931   Post-Procedure Surface Area (cm^2) 0.21 cm^2 12/27/24 0931   Post-Procedure Volume (cm^3) 0.105 cm^3 12/27/24 0931   Tunneling Position ___ O'Clock 0.5 12/27/24 0849   Undermining Starts ___ O'Clock @3 12/27/24 0849   Wound Assessment Pink/red 01/03/25 0128   Drainage Amount None (dry) 01/03/25 0128   Drainage Description Serous;Yellow 01/02/25 1400   Odor None 01/03/25 0128   Laxmi-wound Assessment Fragile;Intact 01/02/25 1400   Number of days: 71       Assessment:    Recurrent complicated diverticulitis/colocutaneous fistula s/p laparoscopic robotic assisted low anterior resection with low pelvic anastomosis.  complete mobilization of splenic flexure, open drainage of pelvic abscess by Dr. Carranza on January 2, 2025.  Colon anastomotic leak with feculent peritonitis and intra-abdominal abscess formation status post diagnostic laparoscopy converted to exploratory laparotomy, partial colon resection, drainage of pelvic abscess, and end colostomy creation 1/6/2025  Non-insulin-dependent diabetes mellitus type II with hemoglobin A1c 8.6  Hypothyroid maintained on Synthroid  Reactive airway disease, controlled  Arthritis  Sleep apnea uses home CPAP  Obesity with elevated BMI of 38.10 kg/m²  Vitamin D deficiency      Plan:   Velma improved to the point where she is medical appropriate for discharge. She was discharged by Gen Surg team but SNF is not yet arranged. She is tolerating a diet and having good ostomy output.  She is having some bloating, further diet, bowel regimen and symptom management as per the surgery team.  She is presently having abdominal bloating and discomfort and we have encouraged ambulation and activity.  Chronic morbidities, labs 
CPAP  Obesity with elevated BMI of 38.10 kg/m²  Vitamin D deficiency      Plan:   Velma remains acceptable for discharge at this point we have been awaiting precertification for several days.  Antibiotics are being employed as per the infectious disease team.  Chronic comorbidities are well-controlled.  Medication reconciliation has been completed.  She remains acceptable for discharge.    More than 50% of my time was spent at the bedside counseling/coordinating care with the patient and/or family with face to face contact.  This time was spent reviewing notes and laboratory data as well as instructing and counseling the patient. Time I spent with the family or surrogate(s) is included only if the patient was incapable of providing the necessary information or participating in medical decisions. I also discussed the differential diagnosis and all of the proposed management plans with the patient and individuals accompanying the patient. I am readily available for any further decision-making and intervention.       DO BROOKLYNN Tsai  1/14/2025  7:36 AM   
Time I spent with the family or surrogate(s) is included only if the patient was incapable of providing the necessary information or participating in medical decisions. I also discussed the differential diagnosis and all of the proposed management plans with the patient and individuals accompanying the patient.    The patient was seen, examined and then discussed with Dr. Avery.     CHRIS Alvarez - CNP  1/10/2025  8:05 PM       I saw and evaluated the patient. I agree with the findings and the plan of care as documented in Remington PEREZ-CNP note.    Paul Avery DO, FACOI  8:12 PM  1/10/2025        
patient.    Brant Tian DO, F.A.C.OMarkellI.  1/6/2025  7:28 AM

## 2025-01-14 NOTE — CARE COORDINATION
CM note: Pt accepted at \[facility]\"" and we are awaiting pre-cert that was submitted yesterday.  HENS and ELISSA have been completed. Pt has midline.             ADDENDUM:11:12 AM  \[facility]\"" has received pre-cert.  Spoke with patient re:transportation and she will call her friend to see if she will transport her and let CM know.

## 2025-01-14 NOTE — CARE COORDINATION
CM note: Pt requested transportation be arranged as she was unable to get a ride from family/friends   van arranged with [facility] for a 3 PM  to [facility].  ZEB and ELISSA completed.  Liaison aware of arrangements, pt to notify her family.

## 2025-01-14 NOTE — PLAN OF CARE
Problem: Discharge Planning  Goal: Discharge to home or other facility with appropriate resources  1/12/2025 0746 by Lucero Ang RN  Outcome: Progressing  1/11/2025 2223 by Rahul Silva RN  Outcome: Progressing     Problem: Pain  Goal: Verbalizes/displays adequate comfort level or baseline comfort level  1/12/2025 0746 by Lucero Ang RN  Outcome: Progressing  1/11/2025 2223 by Rahul Silva RN  Outcome: Progressing     Problem: Safety - Adult  Goal: Free from fall injury  1/12/2025 0746 by Lucero Ang RN  Outcome: Progressing  1/11/2025 2223 by Rahul Silva RN  Outcome: Progressing     Problem: Chronic Conditions and Co-morbidities  Goal: Patient's chronic conditions and co-morbidity symptoms are monitored and maintained or improved  1/12/2025 0746 by Lucero Ang RN  Outcome: Progressing  1/11/2025 2223 by Rahul Silva RN  Outcome: Progressing     Problem: ABCDS Injury Assessment  Goal: Absence of physical injury  1/12/2025 0746 by Lucero Ang RN  Outcome: Progressing  1/11/2025 2223 by Rahul Silva RN  Outcome: Progressing     Problem: Nutrition Deficit:  Goal: Optimize nutritional status  1/12/2025 0746 by Lucero Ang RN  Outcome: Progressing  1/11/2025 2223 by Rahul Silva RN  Outcome: Progressing     
  Problem: Discharge Planning  Goal: Discharge to home or other facility with appropriate resources  1/12/2025 1940 by Rahul Silva RN  Outcome: Progressing     Problem: Pain  Goal: Verbalizes/displays adequate comfort level or baseline comfort level  1/12/2025 1940 by Rahul Silva RN  Outcome: Progressing     Problem: Safety - Adult  Goal: Free from fall injury  1/12/2025 1940 by Rahul Silva RN  Outcome: Progressing     Problem: Chronic Conditions and Co-morbidities  Goal: Patient's chronic conditions and co-morbidity symptoms are monitored and maintained or improved  1/12/2025 1940 by Rahul Silva RN  Outcome: Progressing     Problem: ABCDS Injury Assessment  Goal: Absence of physical injury  1/12/2025 1940 by Rahul Silva RN  Outcome: Progressing     Problem: Nutrition Deficit:  Goal: Optimize nutritional status  1/12/2025 1940 by Rahul Silva, RN  Outcome: Progressing     
  Problem: Discharge Planning  Goal: Discharge to home or other facility with appropriate resources  1/13/2025 2340 by Shira Abarca RN  Outcome: Progressing  1/13/2025 1052 by Charisse Goncalves RN  Outcome: Progressing     Problem: Pain  Goal: Verbalizes/displays adequate comfort level or baseline comfort level  1/13/2025 2340 by Shira Abarca RN  Outcome: Progressing  1/13/2025 1052 by Charisse Goncalves RN  Outcome: Progressing     Problem: Safety - Adult  Goal: Free from fall injury  1/13/2025 2340 by Shira Abarca RN  Outcome: Progressing  1/13/2025 1052 by Charisse Goncalves RN  Outcome: Progressing     Problem: Chronic Conditions and Co-morbidities  Goal: Patient's chronic conditions and co-morbidity symptoms are monitored and maintained or improved  1/13/2025 2340 by Shira Abarca RN  Outcome: Progressing  1/13/2025 1052 by Charisse Goncalves RN  Outcome: Progressing     Problem: ABCDS Injury Assessment  Goal: Absence of physical injury  1/13/2025 2340 by Shira Abarca RN  Outcome: Progressing  1/13/2025 1052 by Charisse Goncalves RN  Outcome: Progressing     Problem: Nutrition Deficit:  Goal: Optimize nutritional status  1/13/2025 2340 by Shira Abarca RN  Outcome: Progressing  1/13/2025 1052 by Charisse Goncalves RN  Outcome: Progressing     
  Problem: Discharge Planning  Goal: Discharge to home or other facility with appropriate resources  1/2/2025 2120 by Cici Flores RN  Outcome: Progressing     Problem: Pain  Goal: Verbalizes/displays adequate comfort level or baseline comfort level  1/2/2025 2120 by Cici Flores RN  Outcome: Progressing     Problem: Safety - Adult  Goal: Free from fall injury  1/2/2025 2120 by Cici Flores RN  Outcome: Progressing     Problem: Chronic Conditions and Co-morbidities  Goal: Patient's chronic conditions and co-morbidity symptoms are monitored and maintained or improved  1/2/2025 2120 by Cici Flores RN  Outcome: Progressing     Problem: ABCDS Injury Assessment  Goal: Absence of physical injury  1/2/2025 2120 by Cici Flores RN  Outcome: Progressing     
  Problem: Discharge Planning  Goal: Discharge to home or other facility with appropriate resources  1/4/2025 1005 by Shanta Ray RN  Outcome: Progressing  1/3/2025 2251 by Crystal Jeffers RN  Outcome: Progressing     Problem: Pain  Goal: Verbalizes/displays adequate comfort level or baseline comfort level  1/4/2025 1005 by Shanta Ray RN  Outcome: Progressing  1/3/2025 2251 by Crystal Jeffers RN  Outcome: Progressing     Problem: Safety - Adult  Goal: Free from fall injury  1/4/2025 1005 by Shanta Ray RN  Outcome: Progressing  1/3/2025 2251 by Crystal Jeffres RN  Outcome: Progressing     Problem: Chronic Conditions and Co-morbidities  Goal: Patient's chronic conditions and co-morbidity symptoms are monitored and maintained or improved  1/4/2025 1005 by Shanta Ray RN  Outcome: Progressing  1/3/2025 2251 by Crystal Jeffers RN  Outcome: Progressing     Problem: ABCDS Injury Assessment  Goal: Absence of physical injury  1/4/2025 1005 by Shanta Ray RN  Outcome: Progressing  1/3/2025 2251 by Crystal Jeffers RN  Outcome: Progressing     
  Problem: Discharge Planning  Goal: Discharge to home or other facility with appropriate resources  1/4/2025 2145 by Lucero Ang RN  Outcome: Progressing  1/4/2025 1005 by Shanta Ray RN  Outcome: Progressing     Problem: Pain  Goal: Verbalizes/displays adequate comfort level or baseline comfort level  1/4/2025 2145 by Lucero Ang RN  Outcome: Progressing  1/4/2025 1005 by Shanta Ray RN  Outcome: Progressing     Problem: Safety - Adult  Goal: Free from fall injury  1/4/2025 2145 by Lucero Ang RN  Outcome: Progressing  1/4/2025 1005 by Shanta Ray RN  Outcome: Progressing     Problem: Chronic Conditions and Co-morbidities  Goal: Patient's chronic conditions and co-morbidity symptoms are monitored and maintained or improved  1/4/2025 2145 by Lucero Ang RN  Outcome: Progressing  1/4/2025 1005 by Shanta Ray RN  Outcome: Progressing     Problem: ABCDS Injury Assessment  Goal: Absence of physical injury  1/4/2025 2145 by Lucero Ang RN  Outcome: Progressing  1/4/2025 1005 by Shanta Ray RN  Outcome: Progressing     
  Problem: Discharge Planning  Goal: Discharge to home or other facility with appropriate resources  1/7/2025 0758 by Tiara Ahn RN  Outcome: Progressing     Problem: Pain  Goal: Verbalizes/displays adequate comfort level or baseline comfort level  1/7/2025 0758 by Tiara Ahn RN  Outcome: Progressing     Problem: Safety - Adult  Goal: Free from fall injury  1/7/2025 0758 by Tiara Ahn RN  Outcome: Progressing     Problem: Chronic Conditions and Co-morbidities  Goal: Patient's chronic conditions and co-morbidity symptoms are monitored and maintained or improved  1/7/2025 0758 by Tiara Ahn RN  Outcome: Progressing     Problem: ABCDS Injury Assessment  Goal: Absence of physical injury  1/7/2025 0758 by Tiara Ahn RN  Outcome: Progressing     
  Problem: Discharge Planning  Goal: Discharge to home or other facility with appropriate resources  1/9/2025 2231 by Crystal Mcguire RN  Outcome: Progressing     Problem: Pain  Goal: Verbalizes/displays adequate comfort level or baseline comfort level  1/9/2025 2231 by Crystal Mcguire RN  Outcome: Progressing     Problem: Safety - Adult  Goal: Free from fall injury  1/9/2025 2231 by Crystal Mcguire RN  Outcome: Progressing     Problem: Chronic Conditions and Co-morbidities  Goal: Patient's chronic conditions and co-morbidity symptoms are monitored and maintained or improved  1/9/2025 2231 by Crystal Mcguire RN  Outcome: Progressing     
  Problem: Discharge Planning  Goal: Discharge to home or other facility with appropriate resources  Outcome: Progressing     Problem: Pain  Goal: Verbalizes/displays adequate comfort level or baseline comfort level  1/10/2025 2207 by Unique Reagan RN  Outcome: Progressing  1/10/2025 1330 by Kathy Hooker RN  Outcome: Progressing     Problem: Safety - Adult  Goal: Free from fall injury  1/10/2025 2207 by Unique Reagan RN  Outcome: Progressing  1/10/2025 1330 by Kathy Hooker RN  Outcome: Progressing     Problem: Chronic Conditions and Co-morbidities  Goal: Patient's chronic conditions and co-morbidity symptoms are monitored and maintained or improved  Outcome: Progressing     Problem: ABCDS Injury Assessment  Goal: Absence of physical injury  Outcome: Progressing     Problem: Nutrition Deficit:  Goal: Optimize nutritional status  Outcome: Progressing     
  Problem: Discharge Planning  Goal: Discharge to home or other facility with appropriate resources  Outcome: Progressing     Problem: Pain  Goal: Verbalizes/displays adequate comfort level or baseline comfort level  1/6/2025 2328 by Ev Barr RN  Outcome: Progressing  1/6/2025 1949 by Kathy Hooker RN  Outcome: Progressing     Problem: Safety - Adult  Goal: Free from fall injury  1/6/2025 2328 by Ev Barr RN  Outcome: Progressing  1/6/2025 1949 by Kathy Hooker RN  Outcome: Progressing     Problem: Chronic Conditions and Co-morbidities  Goal: Patient's chronic conditions and co-morbidity symptoms are monitored and maintained or improved  Outcome: Progressing     Problem: ABCDS Injury Assessment  Goal: Absence of physical injury  Outcome: Progressing     
  Problem: Discharge Planning  Goal: Discharge to home or other facility with appropriate resources  Outcome: Progressing     Problem: Pain  Goal: Verbalizes/displays adequate comfort level or baseline comfort level  Outcome: Progressing     Problem: Safety - Adult  Goal: Free from fall injury  Outcome: Progressing     Problem: Chronic Conditions and Co-morbidities  Goal: Patient's chronic conditions and co-morbidity symptoms are monitored and maintained or improved  Outcome: Progressing     Problem: ABCDS Injury Assessment  Goal: Absence of physical injury  Outcome: Progressing     
  Problem: Discharge Planning  Goal: Discharge to home or other facility with appropriate resources  Outcome: Progressing     Problem: Pain  Goal: Verbalizes/displays adequate comfort level or baseline comfort level  Outcome: Progressing     Problem: Safety - Adult  Goal: Free from fall injury  Outcome: Progressing     Problem: Chronic Conditions and Co-morbidities  Goal: Patient's chronic conditions and co-morbidity symptoms are monitored and maintained or improved  Outcome: Progressing     Problem: ABCDS Injury Assessment  Goal: Absence of physical injury  Outcome: Progressing     Problem: Nutrition Deficit:  Goal: Optimize nutritional status  Outcome: Progressing     
  Problem: Discharge Planning  Goal: Discharge to home or other facility with appropriate resources  Outcome: Progressing     Problem: Pain  Goal: Verbalizes/displays adequate comfort level or baseline comfort level  Outcome: Progressing     Problem: Safety - Adult  Goal: Free from fall injury  Outcome: Progressing     Problem: Chronic Conditions and Co-morbidities  Goal: Patient's chronic conditions and co-morbidity symptoms are monitored and maintained or improved  Outcome: Progressing     Problem: ABCDS Injury Assessment  Goal: Absence of physical injury  Outcome: Progressing     Problem: Nutrition Deficit:  Goal: Optimize nutritional status  Outcome: Progressing     
  Problem: Discharge Planning  Goal: Discharge to home or other facility with appropriate resources  Outcome: Progressing  Flowsheets (Taken 1/2/2025 1415)  Discharge to home or other facility with appropriate resources:   Identify barriers to discharge with patient and caregiver   Identify discharge learning needs (meds, wound care, etc)     Problem: Pain  Goal: Verbalizes/displays adequate comfort level or baseline comfort level  Outcome: Progressing     Problem: Safety - Adult  Goal: Free from fall injury  Outcome: Progressing     Problem: Chronic Conditions and Co-morbidities  Goal: Patient's chronic conditions and co-morbidity symptoms are monitored and maintained or improved  Outcome: Progressing     Problem: ABCDS Injury Assessment  Goal: Absence of physical injury  Outcome: Progressing     
  Problem: Pain  Goal: Verbalizes/displays adequate comfort level or baseline comfort level  Outcome: Progressing     Problem: Safety - Adult  Goal: Free from fall injury  Outcome: Progressing     
Yes

## 2025-01-16 ENCOUNTER — OUTSIDE SERVICES (OUTPATIENT)
Dept: PULMONOLOGY | Age: 60
End: 2025-01-16

## 2025-01-16 ENCOUNTER — OUTSIDE SERVICES (OUTPATIENT)
Dept: PRIMARY CARE CLINIC | Age: 60
End: 2025-01-16

## 2025-01-16 DIAGNOSIS — Z48.815 ENCOUNTER FOR SURGICAL AFTERCARE FOLLOWING SURGERY ON THE DIGESTIVE SYSTEM: ICD-10-CM

## 2025-01-16 DIAGNOSIS — E11.65 TYPE 2 DIABETES MELLITUS WITH HYPERGLYCEMIA, UNSPECIFIED WHETHER LONG TERM INSULIN USE (HCC): ICD-10-CM

## 2025-01-16 DIAGNOSIS — J45.901 ASTHMA WITH ACUTE EXACERBATION, UNSPECIFIED ASTHMA SEVERITY, UNSPECIFIED WHETHER PERSISTENT: ICD-10-CM

## 2025-01-16 DIAGNOSIS — T81.82XD: ICD-10-CM

## 2025-01-16 DIAGNOSIS — E55.9 VITAMIN D DEFICIENCY, UNSPECIFIED: ICD-10-CM

## 2025-01-16 DIAGNOSIS — K65.1 PERITONEAL ABSCESS (HCC): Primary | ICD-10-CM

## 2025-01-16 DIAGNOSIS — B95.2 ENTEROCOCCUS AS THE CAUSE OF DISEASES CLASSIFIED ELSEWHERE: ICD-10-CM

## 2025-01-16 DIAGNOSIS — E03.9 HYPOTHYROIDISM, UNSPECIFIED TYPE: ICD-10-CM

## 2025-01-16 NOTE — DISCHARGE INSTRUCTIONS
Visit Discharge/Physician Orders    Discharge condition: {STABLE/UNSTABLE:111744942}    Assessment of pain at discharge:    Anesthetic used:     Discharge to: {[facility] Wound Discharge To:20937}    Left via:{Left Via:20938}    Accompanied by: accompanied by {:[id number]}    ECF/HHA:     Dressing Orders:    Treatment Orders:    [facility] followup visit _____________________________  (Please note your next appointment above and if you are unable to keep, kindly give a 24 hour notice. Thank you.)    Physician signature:__________________________      If you experience any of the following, please call the Wound Care Center during business hours:    * Increase in Pain  * Temperature over 101  * Increase in drainage from your wound  * Drainage with a foul odor  * Bleeding  * Increase in swelling  * Need for compression bandage changes due to slippage, breakthrough drainage.    If you need medical attention outside of the business hours of the Wound Care Centers please contact your PCP or go to the nearest emergency room.

## 2025-01-16 NOTE — PROGRESS NOTES
Aric Shore M.D.,[facility]  Nixon Palma D.O., SAURABH., [facility]  Ishan Roberts M.D.  Marcia Calzada M.D.   Tex Ding D.O.  Chidi Newman M.D.       Patient:  Velma Mazariegos 59 y.o. female MRN: [id number]           PULMONARY CONSULTATION    Reason for Consultation: Hypercapnic respiratory failure       Communication with the referring physician will be sent via the electronic medical record.    Chief Complaint: Abdominal pain    CODE STATUS: Full    SUBJECTIVE:  HPI:  Velma Mazariegos is a 59 y.o. female who we are asked to evaluate for hypercapnic respiratory failure and underlying KATHERINE.  She is not previously known to our pulmonary service.  She has a past medical history significant for asthma, arthritis, COPD, emphysema, diabetes mellitus type 2, morbid obesity BMI 38.11, sleep apnea follows with Dr. Pepper at [facility], on home CPAP 10 cm water pressure, arthritis, nicotine dependence.  She only uses albuterol for rescue at home.  She is not on any long-acting medication.  No PFTs on file.  She has never seen pulmonary.    Patient was initially admitted on 1/2/2025 with abdominal pain.  Found to have perforated diverticulitis and colocutaneous fistula.  On postop day 4 she developed right lower quadrant pain repeat CT worsening peritoneal free fluid, taken back to surgery for ex lap, concern for anastomotic leak.  She was treated with Diflucan, daptomycin per ID recommendations.  Discharge to \[facility]\"" 1/14/2025.    All current labs and imaging reviewed.  No recent chest imaging on file.  CT abdomen lung windows reviewed.  She is compliant with home CPAP 10 cm water pressure however she states that she does not feel that this is enough pressure for her at home.  She has not yet seen her sleep doctor at [facility] because of all her acute issues with her recent hospital stay and surgery this appointment will need rescheduled as outpatient.  She is considering trying the device at \[facility]\"" that

## 2025-01-18 ENCOUNTER — HOSPITAL ENCOUNTER (INPATIENT)
Age: 60
LOS: 6 days | Discharge: HOME HEALTH | DRG: 466 | End: 2025-01-24
Attending: EMERGENCY MEDICINE | Admitting: STUDENT IN AN ORGANIZED HEALTH CARE EDUCATION/TRAINING PROGRAM
Payer: COMMERCIAL

## 2025-01-18 ENCOUNTER — APPOINTMENT (OUTPATIENT)
Dept: GENERAL RADIOLOGY | Age: 60
DRG: 466 | End: 2025-01-18
Payer: COMMERCIAL

## 2025-01-18 ENCOUNTER — APPOINTMENT (OUTPATIENT)
Dept: CT IMAGING | Age: 60
DRG: 466 | End: 2025-01-18
Payer: COMMERCIAL

## 2025-01-18 DIAGNOSIS — T14.8XXA WOUND INFECTION: ICD-10-CM

## 2025-01-18 DIAGNOSIS — R50.9 FEVER, UNSPECIFIED FEVER CAUSE: ICD-10-CM

## 2025-01-18 DIAGNOSIS — L08.9 WOUND INFECTION: ICD-10-CM

## 2025-01-18 DIAGNOSIS — R60.0 LEG EDEMA, RIGHT: ICD-10-CM

## 2025-01-18 DIAGNOSIS — K65.9 PERITONITIS (HCC): Primary | ICD-10-CM

## 2025-01-18 DIAGNOSIS — G89.18 POSTOPERATIVE PAIN: ICD-10-CM

## 2025-01-18 PROBLEM — K91.89 LEAK OF ANASTOMOSIS BETWEEN GASTROINTESTINAL STRUCTURES: Status: ACTIVE | Noted: 2025-01-18

## 2025-01-18 LAB
ALBUMIN SERPL-MCNC: 2.7 G/DL (ref 3.5–5.2)
ALP SERPL-CCNC: 122 U/L (ref 35–104)
ALT SERPL-CCNC: 8 U/L (ref 0–32)
ANION GAP SERPL CALCULATED.3IONS-SCNC: 11 MMOL/L (ref 7–16)
AST SERPL-CCNC: 12 U/L (ref 0–31)
BASOPHILS # BLD: 0.02 K/UL (ref 0–0.2)
BASOPHILS NFR BLD: 0 % (ref 0–2)
BILIRUB SERPL-MCNC: 0.4 MG/DL (ref 0–1.2)
BILIRUB UR QL STRIP: NEGATIVE
BUN SERPL-MCNC: 10 MG/DL (ref 6–23)
CALCIUM SERPL-MCNC: 9 MG/DL (ref 8.6–10.2)
CHLORIDE SERPL-SCNC: 98 MMOL/L (ref 98–107)
CLARITY UR: CLEAR
CO2 SERPL-SCNC: 26 MMOL/L (ref 22–29)
COLOR UR: YELLOW
CREAT SERPL-MCNC: 0.7 MG/DL (ref 0.5–1)
EOSINOPHIL # BLD: 0.23 K/UL (ref 0.05–0.5)
EOSINOPHILS RELATIVE PERCENT: 3 % (ref 0–6)
ERYTHROCYTE [DISTWIDTH] IN BLOOD BY AUTOMATED COUNT: 16.9 % (ref 11.5–15)
GFR, ESTIMATED: >90 ML/MIN/1.73M2
GLUCOSE SERPL-MCNC: 97 MG/DL (ref 74–99)
GLUCOSE UR STRIP-MCNC: 500 MG/DL
HCT VFR BLD AUTO: 33.6 % (ref 34–48)
HGB BLD-MCNC: 10.6 G/DL (ref 11.5–15.5)
HGB UR QL STRIP.AUTO: ABNORMAL
IMM GRANULOCYTES # BLD AUTO: 0.06 K/UL (ref 0–0.58)
IMM GRANULOCYTES NFR BLD: 1 % (ref 0–5)
KETONES UR STRIP-MCNC: 15 MG/DL
LACTATE BLDV-SCNC: 1.2 MMOL/L (ref 0.5–1.9)
LEUKOCYTE ESTERASE UR QL STRIP: NEGATIVE
LIPASE SERPL-CCNC: 11 U/L (ref 13–60)
LYMPHOCYTES NFR BLD: 0.76 K/UL (ref 1.5–4)
LYMPHOCYTES RELATIVE PERCENT: 11 % (ref 20–42)
MAGNESIUM SERPL-MCNC: 2.1 MG/DL (ref 1.6–2.6)
MCH RBC QN AUTO: 28.3 PG (ref 26–35)
MCHC RBC AUTO-ENTMCNC: 31.5 G/DL (ref 32–34.5)
MCV RBC AUTO: 89.8 FL (ref 80–99.9)
MONOCYTES NFR BLD: 0.4 K/UL (ref 0.1–0.95)
MONOCYTES NFR BLD: 6 % (ref 2–12)
NEUTROPHILS NFR BLD: 79 % (ref 43–80)
NEUTS SEG NFR BLD: 5.6 K/UL (ref 1.8–7.3)
NITRITE UR QL STRIP: NEGATIVE
PH UR STRIP: 7 [PH] (ref 5–9)
PLATELET # BLD AUTO: 1061 K/UL (ref 130–450)
PLATELET ESTIMATE: ABNORMAL
PMV BLD AUTO: 8 FL (ref 7–12)
POTASSIUM SERPL-SCNC: 3.9 MMOL/L (ref 3.5–5)
PROT SERPL-MCNC: 7.5 G/DL (ref 6.4–8.3)
PROT UR STRIP-MCNC: NEGATIVE MG/DL
RBC # BLD AUTO: 3.74 M/UL (ref 3.5–5.5)
RBC #/AREA URNS HPF: ABNORMAL /HPF
SODIUM SERPL-SCNC: 135 MMOL/L (ref 132–146)
SP GR UR STRIP: 1.01 (ref 1–1.03)
TROPONIN I SERPL HS-MCNC: 13 NG/L (ref 0–9)
TROPONIN I SERPL HS-MCNC: 15 NG/L (ref 0–9)
UROBILINOGEN UR STRIP-ACNC: 1 EU/DL (ref 0–1)
WBC #/AREA URNS HPF: ABNORMAL /HPF
WBC OTHER # BLD: 7.1 K/UL (ref 4.5–11.5)

## 2025-01-18 PROCEDURE — 6370000000 HC RX 637 (ALT 250 FOR IP)

## 2025-01-18 PROCEDURE — 85025 COMPLETE CBC W/AUTO DIFF WBC: CPT

## 2025-01-18 PROCEDURE — 84484 ASSAY OF TROPONIN QUANT: CPT

## 2025-01-18 PROCEDURE — 87088 URINE BACTERIA CULTURE: CPT

## 2025-01-18 PROCEDURE — 1200000000 HC SEMI PRIVATE

## 2025-01-18 PROCEDURE — 83605 ASSAY OF LACTIC ACID: CPT

## 2025-01-18 PROCEDURE — 2580000003 HC RX 258

## 2025-01-18 PROCEDURE — 6360000002 HC RX W HCPCS

## 2025-01-18 PROCEDURE — 96374 THER/PROPH/DIAG INJ IV PUSH: CPT

## 2025-01-18 PROCEDURE — 80053 COMPREHEN METABOLIC PANEL: CPT

## 2025-01-18 PROCEDURE — 6360000004 HC RX CONTRAST MEDICATION: Performed by: RADIOLOGY

## 2025-01-18 PROCEDURE — 96375 TX/PRO/DX INJ NEW DRUG ADDON: CPT

## 2025-01-18 PROCEDURE — 93005 ELECTROCARDIOGRAM TRACING: CPT

## 2025-01-18 PROCEDURE — 87040 BLOOD CULTURE FOR BACTERIA: CPT

## 2025-01-18 PROCEDURE — 83735 ASSAY OF MAGNESIUM: CPT

## 2025-01-18 PROCEDURE — 99285 EMERGENCY DEPT VISIT HI MDM: CPT

## 2025-01-18 PROCEDURE — 87086 URINE CULTURE/COLONY COUNT: CPT

## 2025-01-18 PROCEDURE — 71045 X-RAY EXAM CHEST 1 VIEW: CPT

## 2025-01-18 PROCEDURE — 81001 URINALYSIS AUTO W/SCOPE: CPT

## 2025-01-18 PROCEDURE — 83690 ASSAY OF LIPASE: CPT

## 2025-01-18 PROCEDURE — 74177 CT ABD & PELVIS W/CONTRAST: CPT

## 2025-01-18 RX ORDER — VANCOMYCIN 2 G/400ML
20 INJECTION, SOLUTION INTRAVENOUS ONCE
Status: DISCONTINUED | OUTPATIENT
Start: 2025-01-18 | End: 2025-01-19

## 2025-01-18 RX ORDER — FENTANYL CITRATE 0.05 MG/ML
50 INJECTION, SOLUTION INTRAMUSCULAR; INTRAVENOUS ONCE
Status: COMPLETED | OUTPATIENT
Start: 2025-01-18 | End: 2025-01-18

## 2025-01-18 RX ORDER — ONDANSETRON 2 MG/ML
4 INJECTION INTRAMUSCULAR; INTRAVENOUS ONCE
Status: COMPLETED | OUTPATIENT
Start: 2025-01-18 | End: 2025-01-18

## 2025-01-18 RX ORDER — 0.9 % SODIUM CHLORIDE 0.9 %
1000 INTRAVENOUS SOLUTION INTRAVENOUS ONCE
Status: COMPLETED | OUTPATIENT
Start: 2025-01-18 | End: 2025-01-18

## 2025-01-18 RX ORDER — IOPAMIDOL 755 MG/ML
70 INJECTION, SOLUTION INTRAVASCULAR
Status: COMPLETED | OUTPATIENT
Start: 2025-01-18 | End: 2025-01-18

## 2025-01-18 RX ORDER — ACETAMINOPHEN 500 MG
1000 TABLET ORAL
Status: COMPLETED | OUTPATIENT
Start: 2025-01-18 | End: 2025-01-18

## 2025-01-18 RX ADMIN — IOPAMIDOL 70 ML: 755 INJECTION, SOLUTION INTRAVENOUS at 21:39

## 2025-01-18 RX ADMIN — ACETAMINOPHEN 1000 MG: 500 TABLET ORAL at 20:40

## 2025-01-18 RX ADMIN — PIPERACILLIN AND TAZOBACTAM 4500 MG: 4; .5 INJECTION, POWDER, LYOPHILIZED, FOR SOLUTION INTRAVENOUS at 23:54

## 2025-01-18 RX ADMIN — SODIUM CHLORIDE 1000 ML: 9 INJECTION, SOLUTION INTRAVENOUS at 20:35

## 2025-01-18 RX ADMIN — ONDANSETRON 4 MG: 2 INJECTION, SOLUTION INTRAMUSCULAR; INTRAVENOUS at 20:40

## 2025-01-18 RX ADMIN — FENTANYL CITRATE 50 MCG: 0.05 INJECTION, SOLUTION INTRAMUSCULAR; INTRAVENOUS at 21:29

## 2025-01-18 ASSESSMENT — PAIN DESCRIPTION - DESCRIPTORS: DESCRIPTORS: ACHING

## 2025-01-18 ASSESSMENT — PAIN - FUNCTIONAL ASSESSMENT: PAIN_FUNCTIONAL_ASSESSMENT: 0-10

## 2025-01-18 ASSESSMENT — PAIN SCALES - GENERAL
PAINLEVEL_OUTOF10: 9
PAINLEVEL_OUTOF10: 10
PAINLEVEL_OUTOF10: 10

## 2025-01-18 ASSESSMENT — PAIN DESCRIPTION - ORIENTATION: ORIENTATION: INNER

## 2025-01-18 ASSESSMENT — LIFESTYLE VARIABLES
HOW OFTEN DO YOU HAVE A DRINK CONTAINING ALCOHOL: NEVER
HOW MANY STANDARD DRINKS CONTAINING ALCOHOL DO YOU HAVE ON A TYPICAL DAY: PATIENT DOES NOT DRINK

## 2025-01-18 ASSESSMENT — PAIN DESCRIPTION - ONSET: ONSET: SUDDEN

## 2025-01-18 ASSESSMENT — PAIN DESCRIPTION - PAIN TYPE: TYPE: ACUTE PAIN

## 2025-01-18 ASSESSMENT — PAIN DESCRIPTION - LOCATION
LOCATION: INCISION;UMBILICUS
LOCATION: ABDOMEN

## 2025-01-18 ASSESSMENT — PAIN DESCRIPTION - FREQUENCY: FREQUENCY: CONTINUOUS

## 2025-01-19 ENCOUNTER — APPOINTMENT (OUTPATIENT)
Dept: ULTRASOUND IMAGING | Age: 60
DRG: 466 | End: 2025-01-19
Payer: COMMERCIAL

## 2025-01-19 LAB
ANION GAP SERPL CALCULATED.3IONS-SCNC: 12 MMOL/L (ref 7–16)
BASOPHILS # BLD: 0.11 K/UL (ref 0–0.2)
BASOPHILS NFR BLD: 2 % (ref 0–2)
BUN SERPL-MCNC: 9 MG/DL (ref 6–23)
CALCIUM SERPL-MCNC: 8.1 MG/DL (ref 8.6–10.2)
CHLORIDE SERPL-SCNC: 100 MMOL/L (ref 98–107)
CO2 SERPL-SCNC: 25 MMOL/L (ref 22–29)
CREAT SERPL-MCNC: 0.7 MG/DL (ref 0.5–1)
EKG ATRIAL RATE: 107 BPM
EKG P AXIS: 60 DEGREES
EKG P-R INTERVAL: 152 MS
EKG Q-T INTERVAL: 324 MS
EKG QRS DURATION: 80 MS
EKG QTC CALCULATION (BAZETT): 432 MS
EKG R AXIS: -20 DEGREES
EKG T AXIS: 49 DEGREES
EKG VENTRICULAR RATE: 107 BPM
EOSINOPHIL # BLD: 0.05 K/UL (ref 0.05–0.5)
EOSINOPHILS RELATIVE PERCENT: 1 % (ref 0–6)
ERYTHROCYTE [DISTWIDTH] IN BLOOD BY AUTOMATED COUNT: 17.1 % (ref 11.5–15)
GFR, ESTIMATED: >90 ML/MIN/1.73M2
GLUCOSE SERPL-MCNC: 95 MG/DL (ref 74–99)
HCT VFR BLD AUTO: 30.4 % (ref 34–48)
HGB BLD-MCNC: 9.6 G/DL (ref 11.5–15.5)
LYMPHOCYTES NFR BLD: 0.53 K/UL (ref 1.5–4)
LYMPHOCYTES RELATIVE PERCENT: 9 % (ref 20–42)
MCH RBC QN AUTO: 27.9 PG (ref 26–35)
MCHC RBC AUTO-ENTMCNC: 31.6 G/DL (ref 32–34.5)
MCV RBC AUTO: 88.4 FL (ref 80–99.9)
METAMYELOCYTES ABSOLUTE COUNT: 0.11 K/UL (ref 0–0.12)
METAMYELOCYTES: 2 % (ref 0–1)
MONOCYTES NFR BLD: 0.16 K/UL (ref 0.1–0.95)
MONOCYTES NFR BLD: 3 % (ref 2–12)
NEUTROPHILS NFR BLD: 85 % (ref 43–80)
NEUTS SEG NFR BLD: 5.15 K/UL (ref 1.8–7.3)
PLATELET, FLUORESCENCE: 1253 K/UL (ref 130–450)
PMV BLD AUTO: 8.2 FL (ref 7–12)
POTASSIUM SERPL-SCNC: 3.8 MMOL/L (ref 3.5–5)
PROCALCITONIN SERPL-MCNC: 0.11 NG/ML (ref 0–0.08)
RBC # BLD AUTO: 3.44 M/UL (ref 3.5–5.5)
RBC # BLD: ABNORMAL 10*6/UL
SODIUM SERPL-SCNC: 137 MMOL/L (ref 132–146)
WBC OTHER # BLD: 6.1 K/UL (ref 4.5–11.5)

## 2025-01-19 PROCEDURE — 80048 BASIC METABOLIC PNL TOTAL CA: CPT

## 2025-01-19 PROCEDURE — 2580000003 HC RX 258: Performed by: STUDENT IN AN ORGANIZED HEALTH CARE EDUCATION/TRAINING PROGRAM

## 2025-01-19 PROCEDURE — 2500000003 HC RX 250 WO HCPCS: Performed by: STUDENT IN AN ORGANIZED HEALTH CARE EDUCATION/TRAINING PROGRAM

## 2025-01-19 PROCEDURE — 85025 COMPLETE CBC W/AUTO DIFF WBC: CPT

## 2025-01-19 PROCEDURE — 99222 1ST HOSP IP/OBS MODERATE 55: CPT | Performed by: STUDENT IN AN ORGANIZED HEALTH CARE EDUCATION/TRAINING PROGRAM

## 2025-01-19 PROCEDURE — 84145 PROCALCITONIN (PCT): CPT

## 2025-01-19 PROCEDURE — 6360000002 HC RX W HCPCS: Performed by: STUDENT IN AN ORGANIZED HEALTH CARE EDUCATION/TRAINING PROGRAM

## 2025-01-19 PROCEDURE — 93010 ELECTROCARDIOGRAM REPORT: CPT | Performed by: INTERNAL MEDICINE

## 2025-01-19 PROCEDURE — 0202U NFCT DS 22 TRGT SARS-COV-2: CPT

## 2025-01-19 PROCEDURE — 97161 PT EVAL LOW COMPLEX 20 MIN: CPT | Performed by: PHYSICAL THERAPIST

## 2025-01-19 PROCEDURE — 1200000000 HC SEMI PRIVATE

## 2025-01-19 PROCEDURE — 93970 EXTREMITY STUDY: CPT

## 2025-01-19 PROCEDURE — 6370000000 HC RX 637 (ALT 250 FOR IP): Performed by: STUDENT IN AN ORGANIZED HEALTH CARE EDUCATION/TRAINING PROGRAM

## 2025-01-19 PROCEDURE — 6360000002 HC RX W HCPCS: Performed by: SPECIALIST

## 2025-01-19 PROCEDURE — 2580000003 HC RX 258: Performed by: SPECIALIST

## 2025-01-19 PROCEDURE — 36415 COLL VENOUS BLD VENIPUNCTURE: CPT

## 2025-01-19 PROCEDURE — 94660 CPAP INITIATION&MGMT: CPT

## 2025-01-19 RX ORDER — FUROSEMIDE 10 MG/ML
20 INJECTION INTRAMUSCULAR; INTRAVENOUS ONCE
Status: COMPLETED | OUTPATIENT
Start: 2025-01-19 | End: 2025-01-19

## 2025-01-19 RX ORDER — POTASSIUM CHLORIDE 1500 MG/1
40 TABLET, EXTENDED RELEASE ORAL PRN
Status: DISCONTINUED | OUTPATIENT
Start: 2025-01-19 | End: 2025-01-24 | Stop reason: HOSPADM

## 2025-01-19 RX ORDER — DULOXETIN HYDROCHLORIDE 60 MG/1
60 CAPSULE, DELAYED RELEASE ORAL NIGHTLY
Status: DISCONTINUED | OUTPATIENT
Start: 2025-01-19 | End: 2025-01-24 | Stop reason: HOSPADM

## 2025-01-19 RX ORDER — MAGNESIUM SULFATE IN WATER 40 MG/ML
2000 INJECTION, SOLUTION INTRAVENOUS PRN
Status: DISCONTINUED | OUTPATIENT
Start: 2025-01-19 | End: 2025-01-24 | Stop reason: HOSPADM

## 2025-01-19 RX ORDER — SODIUM CHLORIDE 9 MG/ML
INJECTION, SOLUTION INTRAVENOUS PRN
Status: DISCONTINUED | OUTPATIENT
Start: 2025-01-19 | End: 2025-01-24 | Stop reason: HOSPADM

## 2025-01-19 RX ORDER — PROCHLORPERAZINE EDISYLATE 5 MG/ML
10 INJECTION INTRAMUSCULAR; INTRAVENOUS EVERY 6 HOURS PRN
Status: DISCONTINUED | OUTPATIENT
Start: 2025-01-19 | End: 2025-01-24 | Stop reason: HOSPADM

## 2025-01-19 RX ORDER — MONTELUKAST SODIUM 10 MG/1
10 TABLET ORAL NIGHTLY
Status: DISCONTINUED | OUTPATIENT
Start: 2025-01-19 | End: 2025-01-24 | Stop reason: HOSPADM

## 2025-01-19 RX ORDER — SODIUM CHLORIDE 0.9 % (FLUSH) 0.9 %
10 SYRINGE (ML) INJECTION PRN
Status: DISCONTINUED | OUTPATIENT
Start: 2025-01-19 | End: 2025-01-24 | Stop reason: HOSPADM

## 2025-01-19 RX ORDER — SODIUM CHLORIDE 0.9 % (FLUSH) 0.9 %
10 SYRINGE (ML) INJECTION EVERY 12 HOURS SCHEDULED
Status: DISCONTINUED | OUTPATIENT
Start: 2025-01-19 | End: 2025-01-24 | Stop reason: HOSPADM

## 2025-01-19 RX ORDER — ONDANSETRON 2 MG/ML
4 INJECTION INTRAMUSCULAR; INTRAVENOUS EVERY 6 HOURS PRN
Status: DISCONTINUED | OUTPATIENT
Start: 2025-01-19 | End: 2025-01-24 | Stop reason: HOSPADM

## 2025-01-19 RX ORDER — OXYCODONE HYDROCHLORIDE 5 MG/1
5 TABLET ORAL EVERY 4 HOURS PRN
Status: DISCONTINUED | OUTPATIENT
Start: 2025-01-19 | End: 2025-01-24 | Stop reason: HOSPADM

## 2025-01-19 RX ORDER — MORPHINE SULFATE 4 MG/ML
4 INJECTION, SOLUTION INTRAMUSCULAR; INTRAVENOUS EVERY 4 HOURS PRN
Status: DISCONTINUED | OUTPATIENT
Start: 2025-01-19 | End: 2025-01-24 | Stop reason: HOSPADM

## 2025-01-19 RX ORDER — ALBUTEROL SULFATE 90 UG/1
2 INHALANT RESPIRATORY (INHALATION) 4 TIMES DAILY PRN
Status: DISCONTINUED | OUTPATIENT
Start: 2025-01-19 | End: 2025-01-19

## 2025-01-19 RX ORDER — ACETAMINOPHEN 325 MG/1
650 TABLET ORAL EVERY 4 HOURS PRN
Status: DISCONTINUED | OUTPATIENT
Start: 2025-01-19 | End: 2025-01-24 | Stop reason: HOSPADM

## 2025-01-19 RX ORDER — METHOCARBAMOL 500 MG/1
750 TABLET, FILM COATED ORAL 4 TIMES DAILY
Status: DISCONTINUED | OUTPATIENT
Start: 2025-01-19 | End: 2025-01-24 | Stop reason: HOSPADM

## 2025-01-19 RX ORDER — LORAZEPAM 1 MG/1
1 TABLET ORAL NIGHTLY PRN
Status: DISCONTINUED | OUTPATIENT
Start: 2025-01-19 | End: 2025-01-24 | Stop reason: HOSPADM

## 2025-01-19 RX ORDER — OXYCODONE HYDROCHLORIDE 5 MG/1
10 TABLET ORAL EVERY 4 HOURS PRN
Status: DISCONTINUED | OUTPATIENT
Start: 2025-01-19 | End: 2025-01-24 | Stop reason: HOSPADM

## 2025-01-19 RX ORDER — CALCIUM GLUCONATE 20 MG/ML
1000 INJECTION, SOLUTION INTRAVENOUS ONCE
Status: COMPLETED | OUTPATIENT
Start: 2025-01-19 | End: 2025-01-19

## 2025-01-19 RX ORDER — POTASSIUM CHLORIDE 7.45 MG/ML
10 INJECTION INTRAVENOUS PRN
Status: DISCONTINUED | OUTPATIENT
Start: 2025-01-19 | End: 2025-01-24 | Stop reason: HOSPADM

## 2025-01-19 RX ORDER — FLUCONAZOLE 100 MG/1
400 TABLET ORAL DAILY
Status: DISCONTINUED | OUTPATIENT
Start: 2025-01-19 | End: 2025-01-24 | Stop reason: HOSPADM

## 2025-01-19 RX ORDER — METOCLOPRAMIDE HYDROCHLORIDE 5 MG/ML
10 INJECTION INTRAMUSCULAR; INTRAVENOUS EVERY 6 HOURS
Status: DISCONTINUED | OUTPATIENT
Start: 2025-01-19 | End: 2025-01-24 | Stop reason: HOSPADM

## 2025-01-19 RX ORDER — ONDANSETRON 4 MG/1
4 TABLET, ORALLY DISINTEGRATING ORAL EVERY 8 HOURS PRN
Status: DISCONTINUED | OUTPATIENT
Start: 2025-01-19 | End: 2025-01-24 | Stop reason: HOSPADM

## 2025-01-19 RX ORDER — SODIUM CHLORIDE, SODIUM LACTATE, POTASSIUM CHLORIDE, CALCIUM CHLORIDE 600; 310; 30; 20 MG/100ML; MG/100ML; MG/100ML; MG/100ML
INJECTION, SOLUTION INTRAVENOUS CONTINUOUS
Status: DISCONTINUED | OUTPATIENT
Start: 2025-01-19 | End: 2025-01-21

## 2025-01-19 RX ORDER — DOCUSATE SODIUM 100 MG/1
100 CAPSULE, LIQUID FILLED ORAL 2 TIMES DAILY
Status: DISCONTINUED | OUTPATIENT
Start: 2025-01-19 | End: 2025-01-24 | Stop reason: HOSPADM

## 2025-01-19 RX ORDER — ENOXAPARIN SODIUM 100 MG/ML
40 INJECTION SUBCUTANEOUS DAILY
Status: DISCONTINUED | OUTPATIENT
Start: 2025-01-19 | End: 2025-01-24 | Stop reason: HOSPADM

## 2025-01-19 RX ORDER — LEVOTHYROXINE SODIUM 88 UG/1
88 TABLET ORAL DAILY
Status: DISCONTINUED | OUTPATIENT
Start: 2025-01-19 | End: 2025-01-24 | Stop reason: HOSPADM

## 2025-01-19 RX ORDER — ALBUTEROL SULFATE 0.83 MG/ML
2.5 SOLUTION RESPIRATORY (INHALATION) EVERY 6 HOURS PRN
Status: DISCONTINUED | OUTPATIENT
Start: 2025-01-19 | End: 2025-01-24 | Stop reason: HOSPADM

## 2025-01-19 RX ADMIN — FLUCONAZOLE 400 MG: 100 TABLET ORAL at 14:03

## 2025-01-19 RX ADMIN — LEVOTHYROXINE SODIUM 88 MCG: 0.09 TABLET ORAL at 06:01

## 2025-01-19 RX ADMIN — DAPTOMYCIN 500 MG: 500 INJECTION, POWDER, LYOPHILIZED, FOR SOLUTION INTRAVENOUS at 14:19

## 2025-01-19 RX ADMIN — OXYCODONE 10 MG: 5 TABLET ORAL at 21:18

## 2025-01-19 RX ADMIN — MONTELUKAST 10 MG: 10 TABLET, FILM COATED ORAL at 21:18

## 2025-01-19 RX ADMIN — METHOCARBAMOL TABLETS 750 MG: 500 TABLET, COATED ORAL at 21:17

## 2025-01-19 RX ADMIN — CALCIUM GLUCONATE 1000 MG: 20 INJECTION, SOLUTION INTRAVENOUS at 17:40

## 2025-01-19 RX ADMIN — MORPHINE SULFATE 4 MG: 4 INJECTION, SOLUTION INTRAMUSCULAR; INTRAVENOUS at 01:31

## 2025-01-19 RX ADMIN — METOCLOPRAMIDE HYDROCHLORIDE 10 MG: 5 INJECTION, SOLUTION INTRAMUSCULAR; INTRAVENOUS at 21:18

## 2025-01-19 RX ADMIN — METHOCARBAMOL TABLETS 750 MG: 500 TABLET, COATED ORAL at 14:01

## 2025-01-19 RX ADMIN — OXYCODONE 5 MG: 5 TABLET ORAL at 17:57

## 2025-01-19 RX ADMIN — SODIUM CHLORIDE 40 MG: 9 INJECTION INTRAMUSCULAR; INTRAVENOUS; SUBCUTANEOUS at 14:03

## 2025-01-19 RX ADMIN — METOCLOPRAMIDE HYDROCHLORIDE 10 MG: 5 INJECTION, SOLUTION INTRAMUSCULAR; INTRAVENOUS at 14:02

## 2025-01-19 RX ADMIN — ONDANSETRON 4 MG: 2 INJECTION, SOLUTION INTRAMUSCULAR; INTRAVENOUS at 06:01

## 2025-01-19 RX ADMIN — CEFEPIME 2000 MG: 2 INJECTION, POWDER, FOR SOLUTION INTRAVENOUS at 15:41

## 2025-01-19 RX ADMIN — DOCUSATE SODIUM 100 MG: 100 CAPSULE, LIQUID FILLED ORAL at 21:17

## 2025-01-19 RX ADMIN — ENOXAPARIN SODIUM 40 MG: 100 INJECTION SUBCUTANEOUS at 14:02

## 2025-01-19 RX ADMIN — SODIUM CHLORIDE, PRESERVATIVE FREE 10 ML: 5 INJECTION INTRAVENOUS at 14:23

## 2025-01-19 RX ADMIN — MORPHINE SULFATE 4 MG: 4 INJECTION, SOLUTION INTRAMUSCULAR; INTRAVENOUS at 06:01

## 2025-01-19 RX ADMIN — MORPHINE SULFATE 4 MG: 4 INJECTION, SOLUTION INTRAMUSCULAR; INTRAVENOUS at 12:24

## 2025-01-19 RX ADMIN — SODIUM CHLORIDE, PRESERVATIVE FREE 10 ML: 5 INJECTION INTRAVENOUS at 21:18

## 2025-01-19 RX ADMIN — SODIUM CHLORIDE, POTASSIUM CHLORIDE, SODIUM LACTATE AND CALCIUM CHLORIDE: 600; 310; 30; 20 INJECTION, SOLUTION INTRAVENOUS at 01:30

## 2025-01-19 RX ADMIN — DULOXETINE HYDROCHLORIDE 60 MG: 60 CAPSULE, DELAYED RELEASE ORAL at 21:18

## 2025-01-19 RX ADMIN — FUROSEMIDE 20 MG: 10 INJECTION, SOLUTION INTRAMUSCULAR; INTRAVENOUS at 14:02

## 2025-01-19 ASSESSMENT — PAIN SCALES - GENERAL
PAINLEVEL_OUTOF10: 3
PAINLEVEL_OUTOF10: 10
PAINLEVEL_OUTOF10: 3
PAINLEVEL_OUTOF10: 0
PAINLEVEL_OUTOF10: 3
PAINLEVEL_OUTOF10: 9
PAINLEVEL_OUTOF10: 8
PAINLEVEL_OUTOF10: 3
PAINLEVEL_OUTOF10: 3
PAINLEVEL_OUTOF10: 9

## 2025-01-19 ASSESSMENT — PAIN DESCRIPTION - LOCATION
LOCATION: ABDOMEN

## 2025-01-19 ASSESSMENT — PAIN DESCRIPTION - DESCRIPTORS
DESCRIPTORS: PRESSURE;SHARP
DESCRIPTORS: ACHING;DISCOMFORT
DESCRIPTORS: ACHING

## 2025-01-19 ASSESSMENT — PAIN DESCRIPTION - ORIENTATION: ORIENTATION: LOWER

## 2025-01-19 NOTE — H&P
General Surgery History and Physical  [facility]    Patient's Name/Date of Birth: Velma Mazariegos / 1965  Date: 2025     PCP: Niall Oconnor MD     Chief Complaint: abdominal pain, nausea, and vomiting    HPI:   Velma Mazariegos is a 59 y.o. female who presents to the ED for abdominal pain, nausea, and vomiting. She is status post 25 laparoscopic LAR and drainage of pelvic abscess for complicated diverticulitis complicated by anastomosis leak followed by exploratory laparotomy, drainage of pelvic abscess, flexible sigmoidoscopy, and retention sutures on POD4 25. She was discharged on POD #12/8 to a facility after tolerating a diet and pain controlled.     She represented to the ED last night with worsening abdominal pain, nausea, and vomiting. She had imaging which was significant for post surgical changes although mild peritoneal thickening/enhancement cannot rule out peritonitis. The patient was admitted and continued on antibiotics. She does not have much appetite.     Patient Active Problem List   Diagnosis    Asthma exacerbation    Nicotine addiction    Left ankle sprain    Diverticulitis of colon    Abscess    Right lower quadrant abdominal pain    Colonic diverticular abscess    Decubitus ulcer of right buttock, stage 3 (HCC)    Diabetes mellitus type 2, noninsulin dependent (HCC)    Diverticulitis with perforation and abscess    Colocutaneous fistula    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)    Diverticulitis large intestine w/o perforation or abscess w/o bleeding    Leak of anastomosis between gastrointestinal structures     Past Medical History:   Diagnosis Date    Arthritis     Asthma     Diabetes mellitus (HCC)     Sleep apnea     cpap   #10    Thyroid disease      Past Surgical History:   Procedure Laterality Date    ANKLE SURGERY Left     screws in ankle    BLADDER SUSPENSION       SECTION      x3     COLECTOMY N/A 2025    DIAGNOSTIC LAPAROSCOPY CONVERTED TO EXPLORATORY LAPAROTOMY, PARTIAL COLON RESECTION, DRAINAGE OF PELVIC ABSCESS, FLEXIBLE SIGMOIDOSCOPY, END COLOSTOMY, RETENTION SUTURES performed by Juan David Carranza MD at [facility] OR    COLONOSCOPY      SMALL INTESTINE SURGERY N/A 2025    BOWEL RESECTION SIGMOID LAPAROSCOPIC ROBOTIC XI performed by Juan David Carranza MD at [facility] OR     Allergies   Allergen Reactions    Eggs [Egg White] Rash and Other (See Comments)     EGGS MAKE PT FEEL HOT    Metformin And Related Nausea And Vomiting       The patient has a family history that is negative for severe cardiovascular or respiratory issues, negative for reaction to anesthesia.   Time spent reviewing past medical, surgical, social and family history, vitals, nursing assessment and images. No changes from above documented history.    Social History     Socioeconomic History    Marital status:      Spouse name: Not on file    Number of children: Not on file    Years of education: Not on file    Highest education level: Not on file   Occupational History    Not on file   Tobacco Use    Smoking status: Light Smoker     Current packs/day: 0.00     Types: Cigarettes     Last attempt to quit: 2012     Years since quittin.9    Smokeless tobacco: Never   Substance and Sexual Activity    Alcohol use: No     Comment: rare    Drug use: No    Sexual activity: Not on file   Other Topics Concern    Not on file   Social History Narrative    Not on file     Social Determinants of Health     Financial Resource Strain: Not on file   Food Insecurity: No Food Insecurity (2025)    Hunger Vital Sign     Worried About Running Out of Food in the Last Year: Never true     Ran Out of Food in the Last Year: Never true   Transportation Needs: No Transportation Needs (2025)    PRAPARE - Transportation     Lack of Transportation (Medical): No     Lack of Transportation (Non-Medical): No   Physical Activity: Not  on file   Stress: Not on file   Social Connections: Not on file   Intimate Partner Violence: Not on file   Housing Stability: Low Risk  (1/19/2025)    Housing Stability Vital Sign     Unable to Pay for Housing in the Last Year: No     Number of Times Moved in the Last Year: 1     Homeless in the Last Year: No       Review of Systems    A complete 10 system review was performed and are otherwise negative unless mentioned in the above HPI.   Specific negatives are listed below but may not include all those reviewed.    General ROS: negative for fever, weight loss, fatigue  ENT ROS: negative for rhinorrhea, epistaxis  Heme ROS: negative spontaneous bleeding or bruising  Endo ROS: negative for lethargy and heat/cold intolerance  Resp ROS: negative cough or wheezing  Cardio ROS: negative for chest pain and palpitations  Gl ROS: postiive for abdominal pain, nausea, vomiting.    ROS: negative for dysuria or hematuria  MSK ROS: negative for joint pain, muscle weakness  Psych: negative for depression, anxiety  Neuro ROS: negative for back pain, paresthesias    Physical exam:   BP (!) 141/74   Pulse (!) 102   Temp 99.1 °F (37.3 °C) (Oral)   Resp 18   Ht 1.575 m (5' 2\")   Wt 97.5 kg (215 lb)   SpO2 94%   BMI 39.32 kg/m²   Gen:  NAD, pleasant  Head: normocephalic, atraumatic  EENT: EOMI, no scleral icterus  Neck: trachea midline, no JVD  Lungs: Equal chest rise bilaterally on room air  Heart: Regular rate  Abdomen: soft, obese, abdomen wound open with retention sutures and packed with gauze, covered with dressings and tape. LLQ ostomy with soft brown stool, nondistended  Skin; warm and dry, no open wounds  Ext: normal strength and motor function bilateral upper and lower extremeties. +2 edema  Psych: normal mood and affect    I have reviewed relevant labs and imaging including CT AP and imaging.     Assessment:  Velma Mazariegos is a 59 y.o. female status post 1/2/25 laparoscopic LAR and drainage of pelvic abscess for

## 2025-01-19 NOTE — CONSULTS
CONSULTATION NOTE :ID     Patient - Velma Mazariegos,  Age - 59 y.o.    - 1965      Room Number - 0318/0318-01   MRN -  [id number]   [facility] # - [id number]  Date of Admission -  2025  7:49 PM  Patient's PCP: Niall Oconnor MD     Requesting Physician: Norm Carrillo DO    REASON FOR CONSULTATION   atbx  HISTORY OF PRESENT ILLNESS   This is a very pleasant 59 y.o. female who was admitted on 2025   to the hospital with a chief complaints of   Chief Complaint   Patient presents with    Abdominal Pain     Pt comes to the ED from [facility] with c/o post op abdominal pain with nausea and vomiting. Pt had recent bowel resection surgery on 25 and had colostomy placed.      ID was consulted on 25  Known to ID  Last seen for Pelvic abscess -   Recurrent complicated diverticulitis   On  : Colon anastomotic leak with feculent peritonitis intra-abdominal abscess  S/p DIAGNOSTIC LAPAROSCOPY CONVERTED TO EXPLORATORY LAPAROTOMY, PARTIAL COLON RESECTION, DRAINAGE OF PELVIC ABSCESS, FLEXIBLE SIGMOIDOSCOPY, END COLOSTOMY, RETENTION SUTURES   fluid cx Ca albicans   Pelvic abscess C albicans VSE   Immunodepressed pt   D/c with daptomycin and diflucan     Previously :   Pt presented with Complicated diverticular disease with recurrent complicated diverticulitis. Colocutaneous fistula  Previously rx for diverticulat abscecss with piptazo  2024  On 2025  Laparoscopic robotic-assisted low anterior resection with low pelvic anastomosis.  Complete mobilization of splenic flexure, open drainage of pelvic abscess    Pt presents from SNF with abdominal pain, nausea, and vomiting   Temp 100.3 tachy RA   Cr0.7 wbc7.1   Ua turbid ketones hgb  CT ABDOMEN PELVIS W IV CONTRAST Additional Contrast? None    Result Date: 2025  1. Status post partial colectomy with left lower quadrant colostomy. No evidence of bowel obstruction. 2. Small volume intraperitoneal    Fevers  Pelvic abscess VRE Candida  Diverticular disease  H/o E coli/Strep 9/2024  Can not r/o peritonitis  add GN coverage  Check procal rvp   Us rle  for dvt         Please note that 30 minutes were spent on this case in regards to the intensity and complexity inherent to hospital inpatient or observation care associated with a confirmed or suspected infectious disease.  This included education to patient/representative and/for hospital staff in regards to disease transmission risk assessment and mitigation, public health investigation, analysis and testing, and complex antimicrobial therapy counseling and treatment.    Thank you Norm Carrillo DO for allowing me to participate in this patient's care.  Please call [phone]  for any questions or concerns.    Electronically signed by Keiko Hull MD on 1/19/25 at 9:31 AM EST

## 2025-01-19 NOTE — ED PROVIDER NOTES
Department of Emergency Medicine   ED Provider Note  Admit Date/RoomTime: 2025  7:49 PM  ED Room: 0318/0318-01          History of Present Illness:  25, Time: 8:04 PM EST      Patient is a 59 y.o. female presents with a chief complaint of abd pain, N/V  This has been occurring for today.  Patient states that it gets better with nothing.  Patient states that it gets worse with nothing.  Patient states that it is severe in severity.  Patient states it was acute in onset.  She notes her symptoms started today.  She recently had colectomy and ostomy bag placed on 2025 and has been at nursing facility for the past several days.  Notes today she started to have sharp abdominal pains and nausea and vomiting.  Notes the pain worsened when she started having nausea and vomiting.  Has had some output in her ostomy.  No dysuria, hematuria.  No known fevers or chills, no chest pain, cough or congestion, shortness of breath.    Review of Systems:     Pertinent positives and negatives are stated within HPI.      --------------------------------------------- PAST HISTORY ---------------------------------------------  Past Medical History:  has a past medical history of Arthritis, Asthma, Diabetes mellitus (HCC), Sleep apnea, and Thyroid disease.    Past Surgical History:  has a past surgical history that includes  section; bladder suspension; Ankle surgery (Left); Colonoscopy; Small intestine surgery (N/A, 2025); and colectomy (N/A, 2025).    Social History:  reports that she has been smoking cigarettes. She has never used smokeless tobacco. She reports that she does not drink alcohol and does not use drugs.    Family History: family history includes Diabetes in her father.     The patient’s home medications have been reviewed.    Allergies: Eggs [egg white] and Metformin and related      ---------------------------------------------------PHYSICAL EXAM--------------------------------------    Physical

## 2025-01-19 NOTE — PROGRESS NOTES
4 Eyes Skin Assessment     NAME:  Velma Mazariegos  YOB: 1965  MEDICAL RECORD NUMBER:  [id number]    The patient is being assessed for  Admission    I agree that at least one RN has performed a thorough Head to Toe Skin Assessment on the patient. ALL assessment sites listed below have been assessed.      Areas assessed by both nurses:    Head, Face, Ears, Shoulders, Back, Chest, Arms, Elbows, Hands, Sacrum. Buttock, Coccyx, Ischium, Legs. Feet and Heels, Under Medical Devices , and Other          Does the Patient have a Wound? No noted wound(s)       Dg Prevention initiated by RN: No  Wound Care Orders initiated by RN: No    Pressure Injury (Stage 3,4, Unstageable, DTI, NWPT, and Complex wounds) if present, place Wound referral order by RN under : No    New Ostomies, if present place, Ostomy referral order under : Yes     Nurse 1 eSignature: Electronically signed by Ramonita Mulligan RN on 1/19/25 at 2:42 AM EST    **SHARE this note so that the co-signing nurse can place an eSignature**    Nurse 2 eSignature: Electronically signed by Kandy Narayan RN on 1/19/25 at 2:46 AM EST

## 2025-01-19 NOTE — PROGRESS NOTES
Physical Therapy Initial Evaluation/Plan of Care    Room #:  0318/0318-01  Patient Name: Velma Mazariegos  YOB: 1965  MRN: [id number]    Date of Service: 1/19/2025     Tentative placement recommendation: Subacute Rehab  Equipment recommendation: Equipment at Nursing Home      Evaluating Physical Therapist: Ted Lanza, PT  #[id number]      Specific Provider Orders/Date/Referring Provider :     PT eval and treat  Start:  01/19/25 0745,   End:  01/19/25 0745,   ONE TIME,   Standing Count:  1 Occurrences,   R       Norm Carrillo DO    Admitting Diagnosis:   Peritonitis (HCC) [K65.9]  Fever, unspecified fever cause [R50.9]  Leak of anastomosis between gastrointestinal structures [K91.89]    Admitted with    tachycardia and above  Surgery: status post 1/2/25 laparoscopic LAR and drainage of pelvic abscess for complicated diverticulitis complicated by anastomosis leak followed by exploratory laparotomy, drainage of pelvic abscess, flexible sigmoidoscopy, and retention sutures on POD4 1/6/25  Visit Diagnoses         Codes    Peritonitis (HCC)    -  Primary K65.9    Fever, unspecified fever cause     R50.9    Leg edema, right     R60.0            Patient Active Problem List   Diagnosis    Asthma exacerbation    Nicotine addiction    Left ankle sprain    Diverticulitis of colon    Abscess    Right lower quadrant abdominal pain    Colonic diverticular abscess    Decubitus ulcer of right buttock, stage 3 (HCC)    Diabetes mellitus type 2, noninsulin dependent (HCC)    Diverticulitis with perforation and abscess    Colocutaneous fistula    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

## 2025-01-20 LAB
ALBUMIN SERPL-MCNC: 2.4 G/DL (ref 3.5–5.2)
ALP SERPL-CCNC: 100 U/L (ref 35–104)
ALT SERPL-CCNC: 7 U/L (ref 0–32)
ANION GAP SERPL CALCULATED.3IONS-SCNC: 11 MMOL/L (ref 7–16)
ANION GAP SERPL CALCULATED.3IONS-SCNC: NORMAL MMOL/L
AST SERPL-CCNC: 12 U/L (ref 0–31)
B PARAP IS1001 DNA NPH QL NAA+NON-PROBE: NOT DETECTED
B PERT DNA SPEC QL NAA+PROBE: NOT DETECTED
BASOPHILS # BLD: 0.07 K/UL (ref 0–0.2)
BASOPHILS NFR BLD: 1 % (ref 0–2)
BILIRUB SERPL-MCNC: 0.2 MG/DL (ref 0–1.2)
BUN SERPL-MCNC: 10 MG/DL (ref 6–23)
BUN SERPL-MCNC: NORMAL MG/DL
BUN/CREAT SERPL: NORMAL (ref 9–20)
C PNEUM DNA NPH QL NAA+NON-PROBE: NOT DETECTED
CALCIUM SERPL-MCNC: 8.3 MG/DL (ref 8.6–10.2)
CALCIUM SERPL-MCNC: NORMAL MG/DL
CHLORIDE SERPL-SCNC: 100 MMOL/L (ref 98–107)
CHLORIDE SERPL-SCNC: NORMAL MMOL/L
CO2 SERPL-SCNC: 25 MMOL/L (ref 22–29)
CO2 SERPL-SCNC: NORMAL MMOL/L
CREAT SERPL-MCNC: 0.8 MG/DL (ref 0.5–1)
CREAT SERPL-MCNC: NORMAL MG/DL
EOSINOPHIL # BLD: 0.34 K/UL (ref 0.05–0.5)
EOSINOPHILS RELATIVE PERCENT: 5 % (ref 0–6)
ERYTHROCYTE [DISTWIDTH] IN BLOOD BY AUTOMATED COUNT: 17.4 % (ref 11.5–15)
FLUAV RNA NPH QL NAA+NON-PROBE: NOT DETECTED
FLUBV RNA NPH QL NAA+NON-PROBE: NOT DETECTED
GFR, ESTIMATED: 85 ML/MIN/1.73M2
GFR, ESTIMATED: NORMAL ML/MIN/1.73M2
GLUCOSE SERPL-MCNC: 105 MG/DL (ref 74–99)
GLUCOSE SERPL-MCNC: NORMAL MG/DL
HADV DNA NPH QL NAA+NON-PROBE: NOT DETECTED
HCOV 229E RNA NPH QL NAA+NON-PROBE: NOT DETECTED
HCOV HKU1 RNA NPH QL NAA+NON-PROBE: NOT DETECTED
HCOV NL63 RNA NPH QL NAA+NON-PROBE: NOT DETECTED
HCOV OC43 RNA NPH QL NAA+NON-PROBE: NOT DETECTED
HCT VFR BLD AUTO: 31.5 % (ref 34–48)
HGB BLD-MCNC: 9.8 G/DL (ref 11.5–15.5)
HMPV RNA NPH QL NAA+NON-PROBE: NOT DETECTED
HPIV1 RNA NPH QL NAA+NON-PROBE: NOT DETECTED
HPIV2 RNA NPH QL NAA+NON-PROBE: NOT DETECTED
HPIV3 RNA NPH QL NAA+NON-PROBE: NOT DETECTED
HPIV4 RNA NPH QL NAA+NON-PROBE: NOT DETECTED
IMM GRANULOCYTES # BLD AUTO: 0.08 K/UL (ref 0–0.58)
IMM GRANULOCYTES NFR BLD: 1 % (ref 0–5)
LYMPHOCYTES NFR BLD: 1.62 K/UL (ref 1.5–4)
LYMPHOCYTES RELATIVE PERCENT: 22 % (ref 20–42)
M PNEUMO DNA NPH QL NAA+NON-PROBE: NOT DETECTED
MAGNESIUM SERPL-MCNC: 1.9 MG/DL (ref 1.6–2.6)
MCH RBC QN AUTO: 27.8 PG (ref 26–35)
MCHC RBC AUTO-ENTMCNC: 31.1 G/DL (ref 32–34.5)
MCV RBC AUTO: 89.5 FL (ref 80–99.9)
MONOCYTES NFR BLD: 0.7 K/UL (ref 0.1–0.95)
MONOCYTES NFR BLD: 10 % (ref 2–12)
NEUTROPHILS NFR BLD: 62 % (ref 43–80)
NEUTS SEG NFR BLD: 4.56 K/UL (ref 1.8–7.3)
PLATELET # BLD AUTO: 831 K/UL (ref 130–450)
PMV BLD AUTO: 8.3 FL (ref 7–12)
POTASSIUM SERPL-SCNC: 3.5 MMOL/L (ref 3.5–5)
POTASSIUM SERPL-SCNC: NORMAL MMOL/L
PROT SERPL-MCNC: 6.5 G/DL (ref 6.4–8.3)
RBC # BLD AUTO: 3.52 M/UL (ref 3.5–5.5)
RSV RNA NPH QL NAA+NON-PROBE: NOT DETECTED
RV+EV RNA NPH QL NAA+NON-PROBE: NOT DETECTED
SARS-COV-2 RNA NPH QL NAA+NON-PROBE: NOT DETECTED
SODIUM SERPL-SCNC: 136 MMOL/L (ref 132–146)
SODIUM SERPL-SCNC: NORMAL MMOL/L
SPECIMEN DESCRIPTION: NORMAL
TROPONIN I SERPL HS-MCNC: 12 NG/L (ref 0–9)
WBC OTHER # BLD: 7.4 K/UL (ref 4.5–11.5)

## 2025-01-20 PROCEDURE — 87205 SMEAR GRAM STAIN: CPT

## 2025-01-20 PROCEDURE — 87070 CULTURE OTHR SPECIMN AEROBIC: CPT

## 2025-01-20 PROCEDURE — 6360000002 HC RX W HCPCS: Performed by: SPECIALIST

## 2025-01-20 PROCEDURE — 87077 CULTURE AEROBIC IDENTIFY: CPT

## 2025-01-20 PROCEDURE — 83735 ASSAY OF MAGNESIUM: CPT

## 2025-01-20 PROCEDURE — 2580000003 HC RX 258: Performed by: SPECIALIST

## 2025-01-20 PROCEDURE — 36415 COLL VENOUS BLD VENIPUNCTURE: CPT

## 2025-01-20 PROCEDURE — 2580000003 HC RX 258: Performed by: STUDENT IN AN ORGANIZED HEALTH CARE EDUCATION/TRAINING PROGRAM

## 2025-01-20 PROCEDURE — 84484 ASSAY OF TROPONIN QUANT: CPT

## 2025-01-20 PROCEDURE — 6360000002 HC RX W HCPCS: Performed by: STUDENT IN AN ORGANIZED HEALTH CARE EDUCATION/TRAINING PROGRAM

## 2025-01-20 PROCEDURE — 1200000000 HC SEMI PRIVATE

## 2025-01-20 PROCEDURE — 94660 CPAP INITIATION&MGMT: CPT

## 2025-01-20 PROCEDURE — 97165 OT EVAL LOW COMPLEX 30 MIN: CPT

## 2025-01-20 PROCEDURE — 85025 COMPLETE CBC W/AUTO DIFF WBC: CPT

## 2025-01-20 PROCEDURE — 80053 COMPREHEN METABOLIC PANEL: CPT

## 2025-01-20 PROCEDURE — 93005 ELECTROCARDIOGRAM TRACING: CPT

## 2025-01-20 PROCEDURE — 97535 SELF CARE MNGMENT TRAINING: CPT

## 2025-01-20 PROCEDURE — 87186 SC STD MICRODIL/AGAR DIL: CPT

## 2025-01-20 PROCEDURE — 6370000000 HC RX 637 (ALT 250 FOR IP): Performed by: STUDENT IN AN ORGANIZED HEALTH CARE EDUCATION/TRAINING PROGRAM

## 2025-01-20 RX ORDER — KETOROLAC TROMETHAMINE 30 MG/ML
30 INJECTION, SOLUTION INTRAMUSCULAR; INTRAVENOUS EVERY 6 HOURS
Status: DISCONTINUED | OUTPATIENT
Start: 2025-01-20 | End: 2025-01-24 | Stop reason: HOSPADM

## 2025-01-20 RX ADMIN — METHOCARBAMOL TABLETS 750 MG: 500 TABLET, COATED ORAL at 16:14

## 2025-01-20 RX ADMIN — OXYCODONE 10 MG: 5 TABLET ORAL at 12:10

## 2025-01-20 RX ADMIN — KETOROLAC TROMETHAMINE 30 MG: 30 INJECTION, SOLUTION INTRAMUSCULAR at 10:39

## 2025-01-20 RX ADMIN — DOCUSATE SODIUM 100 MG: 100 CAPSULE, LIQUID FILLED ORAL at 20:58

## 2025-01-20 RX ADMIN — CEFEPIME 2000 MG: 2 INJECTION, POWDER, FOR SOLUTION INTRAVENOUS at 12:05

## 2025-01-20 RX ADMIN — DULOXETINE HYDROCHLORIDE 60 MG: 60 CAPSULE, DELAYED RELEASE ORAL at 20:58

## 2025-01-20 RX ADMIN — CEFEPIME 2000 MG: 2 INJECTION, POWDER, FOR SOLUTION INTRAVENOUS at 00:27

## 2025-01-20 RX ADMIN — MONTELUKAST 10 MG: 10 TABLET, FILM COATED ORAL at 20:57

## 2025-01-20 RX ADMIN — METHOCARBAMOL TABLETS 750 MG: 500 TABLET, COATED ORAL at 12:47

## 2025-01-20 RX ADMIN — METOCLOPRAMIDE HYDROCHLORIDE 10 MG: 5 INJECTION, SOLUTION INTRAMUSCULAR; INTRAVENOUS at 16:14

## 2025-01-20 RX ADMIN — ENOXAPARIN SODIUM 40 MG: 100 INJECTION SUBCUTANEOUS at 08:29

## 2025-01-20 RX ADMIN — KETOROLAC TROMETHAMINE 30 MG: 30 INJECTION, SOLUTION INTRAMUSCULAR at 22:15

## 2025-01-20 RX ADMIN — KETOROLAC TROMETHAMINE 30 MG: 30 INJECTION, SOLUTION INTRAMUSCULAR at 16:14

## 2025-01-20 RX ADMIN — METOCLOPRAMIDE HYDROCHLORIDE 10 MG: 5 INJECTION, SOLUTION INTRAMUSCULAR; INTRAVENOUS at 08:28

## 2025-01-20 RX ADMIN — SODIUM CHLORIDE 40 MG: 9 INJECTION INTRAMUSCULAR; INTRAVENOUS; SUBCUTANEOUS at 08:28

## 2025-01-20 RX ADMIN — METHOCARBAMOL TABLETS 750 MG: 500 TABLET, COATED ORAL at 20:58

## 2025-01-20 RX ADMIN — METOCLOPRAMIDE HYDROCHLORIDE 10 MG: 5 INJECTION, SOLUTION INTRAMUSCULAR; INTRAVENOUS at 22:16

## 2025-01-20 RX ADMIN — METOCLOPRAMIDE HYDROCHLORIDE 10 MG: 5 INJECTION, SOLUTION INTRAMUSCULAR; INTRAVENOUS at 04:19

## 2025-01-20 RX ADMIN — SODIUM CHLORIDE, POTASSIUM CHLORIDE, SODIUM LACTATE AND CALCIUM CHLORIDE: 600; 310; 30; 20 INJECTION, SOLUTION INTRAVENOUS at 16:12

## 2025-01-20 RX ADMIN — LEVOTHYROXINE SODIUM 88 MCG: 0.09 TABLET ORAL at 06:00

## 2025-01-20 RX ADMIN — FLUCONAZOLE 400 MG: 100 TABLET ORAL at 08:29

## 2025-01-20 RX ADMIN — METHOCARBAMOL TABLETS 750 MG: 500 TABLET, COATED ORAL at 08:29

## 2025-01-20 RX ADMIN — DOCUSATE SODIUM 100 MG: 100 CAPSULE, LIQUID FILLED ORAL at 08:30

## 2025-01-20 RX ADMIN — OXYCODONE 10 MG: 5 TABLET ORAL at 04:25

## 2025-01-20 RX ADMIN — DAPTOMYCIN 500 MG: 500 INJECTION, POWDER, LYOPHILIZED, FOR SOLUTION INTRAVENOUS at 09:56

## 2025-01-20 ASSESSMENT — PAIN SCALES - GENERAL
PAINLEVEL_OUTOF10: 3
PAINLEVEL_OUTOF10: 8
PAINLEVEL_OUTOF10: 7
PAINLEVEL_OUTOF10: 0
PAINLEVEL_OUTOF10: 5
PAINLEVEL_OUTOF10: 8
PAINLEVEL_OUTOF10: 0
PAINLEVEL_OUTOF10: 0
PAINLEVEL_OUTOF10: 3
PAINLEVEL_OUTOF10: 8

## 2025-01-20 ASSESSMENT — ENCOUNTER SYMPTOMS
DIARRHEA: 0
VOMITING: 0
COUGH: 1
PHOTOPHOBIA: 0
SHORTNESS OF BREATH: 1
ABDOMINAL PAIN: 0
ABDOMINAL DISTENTION: 0
BACK PAIN: 0
CONSTIPATION: 0
BLOOD IN STOOL: 0
SINUS PAIN: 0
NAUSEA: 0
WHEEZING: 1
EYE DISCHARGE: 0
APNEA: 0
EYE ITCHING: 0
SORE THROAT: 0
TROUBLE SWALLOWING: 0

## 2025-01-20 ASSESSMENT — PAIN DESCRIPTION - PAIN TYPE
TYPE: SURGICAL PAIN

## 2025-01-20 ASSESSMENT — PAIN DESCRIPTION - LOCATION
LOCATION: ABDOMEN

## 2025-01-20 ASSESSMENT — PAIN DESCRIPTION - DESCRIPTORS
DESCRIPTORS: ACHING;SORE;DULL
DESCRIPTORS: ACHING;DISCOMFORT;PRESSURE
DESCRIPTORS: SORE
DESCRIPTORS: SORE
DESCRIPTORS: ACHING;SORE;SPASM
DESCRIPTORS: ACHING;SORE

## 2025-01-20 ASSESSMENT — PAIN DESCRIPTION - ORIENTATION
ORIENTATION: MID
ORIENTATION: MID
ORIENTATION: RIGHT;LEFT
ORIENTATION: RIGHT;LEFT

## 2025-01-20 NOTE — PROGRESS NOTES
GENERAL SURGERY  DAILY PROGRESS NOTE    Patient's Name/Date of Birth: Velma Mazariegos / 1965    Date: 2025     Chief Complaint   Patient presents with    Abdominal Pain     Pt comes to the ED from [facility] with c/o post op abdominal pain with nausea and vomiting. Pt had recent bowel resection surgery on 25 and had colostomy placed.         Subjective:  Patient resting comfortably in room upon arrival. States her abdominal pain is the same as yesterday, no worse. Denies any nausea or vomiting. Continues to have ostomy output.     Objective:  Last 24Hrs  Temp  Av.5 °F (37.5 °C)  Min: 99.1 °F (37.3 °C)  Max: 100.4 °F (38 °C)  Resp  Av  Min: 18  Max: 18  Pulse  Av.5  Min: 98  Max: 104  Systolic (24hrs), Av , Min:108 , Max:141     Diastolic (24hrs), Av, Min:70, Max:74    SpO2  Av %  Min: 93 %  Max: 98 %    I/O last 3 completed shifts:  In: 360 [P.O.:360]  Out: 150 [Emesis/NG output:150]      General: In no acute distress, alert and oriented x4  Cardiovascular: Warm throughout, no edema  Respiratory: no respiratory distress, equal chest rise  Abdomen: soft, obese, abdomen wound open with retention sutures and packed with gauze, covered with dressings and tape. LLQ ostomy w/ soft brown stool, non-distended  Skin: no obvious rashes or lesions appreciated, no jaundice  Extremities: atraumatic, no focal motor deficits, no open wounds      CBC  Recent Labs     25  0355 25  0417   WBC 7.1 6.1 7.4   RBC 3.74 3.44* 3.52   HGB 10.6* 9.6* 9.8*   HCT 33.6* 30.4* 31.5*   MCV 89.8 88.4 89.5   MCH 28.3 27.9 27.8   MCHC 31.5* 31.6* 31.1*   RDW 16.9* 17.1* 17.4*   PLT 1,061*  --  831*   MPV 8.0 8.2 8.3       CMP  Recent Labs     25  0355    137   K 3.9 3.8   CL 98 100   CO2 26 25   BUN 10 9   CREATININE 0.7 0.7   GLUCOSE 97 95   CALCIUM 9.0 8.1*   BILITOT 0.4  --    ALKPHOS 122*  --    AST 12  --    ALT 8  --

## 2025-01-20 NOTE — H&P
[place] [id number]                           HISTORY & PHYSICAL      PATIENT NAME: MARSHALL FIERRO            : 1965  MED REC NO: [id number]                        ROOM: 0318  ACCOUNT NO: [id number]                       ADMIT DATE: 2025  PROVIDER: Paul Avery DO      CHIEF COMPLAINT AND HISTORY OF CHIEF COMPLAINT:  This is a 59-year-old white female who was admitted to [facility].  The patient presented to the hospital here through the emergency room after being recently discharged from this institution on 2025.  The patient presented to the hospital here through the emergency room with chief complaint of increasing abdominal pain and discomfort.  The patient does relate to having history of abdominal pain, nausea, and vomiting.  This has been occurring on for the past several days.  The patient has given   history.  The patient states the symptomatology started getting worse today.  The patient was initially admitted to the hospital here on 2025.  The patient at that time admitted to the hospital here, underwent a laparoscopic robotic assisted low anterior resection with low pelvic anastomosis with complete mobilization of splenic flexure, open drainage of pelvic abscess by Dr. Carranza.  The patient did relatively well at this time.  The patient was recuperating nicely, but did have an episode of severe abdominal pain on the . Reviewing CAT scan finding at that time did show evidence of a pneumoperitoneum.  Some of this was felt to be due to postsurgical finding.  The patient at this time did seem to clinically improve, but had worsening pain.  The patient at that time was taken to surgery again by Dr. Carranza on  with a colonic anastomosis leak was present with fecal peritonitis with intraabdominal abscess.  The patient underwent a diagnostic laparoscopy  with poor oral intake and neurologically denies any history of stroke, TIAs, etc.  She has been admitted to the general surgical floor.    ALLERGIES:  SHE IS ALLERGIC TO EGGS AND METFORMIN.      MEDICATIONS:  Prior to admission include the following; she has been on Proventil 2 puffs q.4 p.r.n., Cubicin 500 mg IV daily for 21 days, Cymbalta 60 daily, Jardiance 25 daily, Diflucan 200 mg 2 tablets daily, Lidex, Advair Diskus 2 puffs q.4 p.r.n., Synthroid 88 mcg daily, Tradjenta 5 daily, Robaxin p.r.n., and Zofran.    PAST MEDICAL HISTORY:  Positive for usual childhood diseases, history of arthritis, asthma, type 2 diabetes mellitus, sleep apnea, on CPAP of 10. Primary hypothyroidism.  The patient has also had recent colon resection for diverticulitis.    PAST SURGICAL HISTORY:  Ankle surgery, bladder surgery, , colectomy on 2025, converted to a colostomy on . Colon resection for diverticulitis.    FAMILY HISTORY:  Parents are alive.  Does not smoke.  Does not drink.  Currently single, mother of 3 children.    LABORATORY AND DIAGNOSTIC DATA:  Review of the chart; venous duplex study shows no DVT.  EKG shows sinus tachycardia.  Hemoglobin and hematocrit 9.6 and 30.4 respectively, white count 6000.  Urinalysis showed a large amount of hemoglobin.  CT of the abdomen and pelvis showed stable partial colectomy.  Chest x-ray showed no acute finding.  Moderate-sized soft tissue gas in the left breast, lower chest.  BUN and creatinine were 10 and 0.6 respectively.  Troponin level 15.  Hemoglobin and hematocrit 10.6 and 33.6 respectively.    PHYSICAL EXAMINATION:  VITAL SIGNS:  Height is 5 feet 2 inches, weight is 215 pounds.  BMI is 39.32.  Blood pressure 108/70, pulse 104, temperature 99.1, respirations 18, O2 saturation 95%.  GENERAL APPEARANCE:  This is a pleasant, but teary eye 59-year-old white female, resting comfortably.  HEENT: Normal to gross inspection.  The patient was eating her

## 2025-01-20 NOTE — PROGRESS NOTES
Internal Medicine Progress Note     [facility]     Paul Avery D.O., AIMEEIMarkell Tian D.O., AB Castrejon D.O.     Mili Hays, MSN, APRN, NP-C  Filemon Rosales, MSN, APRN-CNP  Remington Forte, MSN, APRN, NP-C  Christin Mary, MSN, APRN-CNP  Xochitl Bach, MSN, APRN, NP-C     Primary Care Physician: Niall Oconnor MD   Admitting Physician:  Norm Carrillo DO  Admission date and time: 1/18/2025  7:49 PM    Room:  [address] [place] [address]  Admitting diagnosis: Peritonitis (HCC) [K65.9]  Fever, unspecified fever cause [R50.9]  Leak of anastomosis between gastrointestinal structures [K91.89]    Patient Name: Velma Mazariegos  MRN: [id number]    Date of Service: 1/20/2025     Subjective:  Velma is a 59 y.o. female who was seen and examined today,1/20/2025, at the bedside. Velma seems to be resting comfortably today.  She describes the expected amount of postoperative abdominal pain.  She is having adequate ostomy output and is requesting advancement in her diet.  She has been ambulatory without difficulty.  She has been updated regarding laboratory findings.  We discussed discharge planning.    Review of System:   Constitutional:   Denies fever or chills, weight loss or gain, fatigue or malaise.  HEENT:   Denies ear pain, sore throat, sinus or eye problems.  Cardiovascular:   Denies any chest pain, irregular heartbeats, or palpitations.   Respiratory:   Denies shortness of breath, coughing, sputum production, hemoptysis, or wheezing.  Gastrointestinal:   Operative abdominal pain as to be expected.  She is requesting advancement in her diet.  Genitourinary:    Denies any urgency, frequency, hematuria. Voiding  without difficulty.  Extremities:   Denies lower extremity swelling, edema or cyanosis.   Neurology:    Denies any headache or focal neurological deficits, improving weakness and deconditioning.  Psch:   Denies being anxious or

## 2025-01-20 NOTE — PROGRESS NOTES
OCCUPATIONAL THERAPY INITIAL EVALUATION    [facility]  [address] [place]        Date:2025                                                  Patient Name: Velma Mazariegos    MRN: [id number]    : 1965    Room: [address] [place] [address]      Evaluating OT: Velma Coronel OTR/L; [id number]     Referring Provider and Specific Provider Orders/Date:      25  OT eval and treat  Start:  25,   End:  25,   ONE TIME,   Standing Count:  1 Occurrences,   R         Norm Carrillo, DO      Placement Recommendation: Subacute        Diagnosis:   1. Peritonitis (HCC)    2. Fever, unspecified fever cause    3. Leg edema, right         Surgery: none       Pertinent Medical History:       Past Medical History:   Diagnosis Date    Arthritis     Asthma     Diabetes mellitus (HCC)     Sleep apnea     cpap   #10    Thyroid disease          Past Surgical History:   Procedure Laterality Date    ANKLE SURGERY Left     screws in ankle    BLADDER SUSPENSION       SECTION      x3    COLECTOMY N/A 2025    DIAGNOSTIC LAPAROSCOPY CONVERTED TO EXPLORATORY LAPAROTOMY, PARTIAL COLON RESECTION, DRAINAGE OF PELVIC ABSCESS, FLEXIBLE SIGMOIDOSCOPY, END COLOSTOMY, RETENTION SUTURES performed by Juan David Carranza MD at [facility] OR    COLONOSCOPY      SMALL INTESTINE SURGERY N/A 2025    BOWEL RESECTION SIGMOID LAPAROSCOPIC ROBOTIC XI performed by Juan David Carranza MD at [facility] OR      Precautions:  Fall Risk, recently colostomy     Assessment of current deficits:     [x] Functional mobility  [x]ADLs  [x] Strength               []Cognition    [x] Functional transfers   [x] IADLs         [] Safety Awareness   [x]Endurance    [] Fine Coordination              [x] Balance      [] Vision/perception   []Sensation     []Gross Motor Coordination  [] ROM  [] Delirium                   [] Motor Control     OT PLAN OF CARE   OT POC based on physician orders,  patient diagnosis and results of clinical assessment    Frequency/Duration 1-3 days/wk for 2 weeks PRN     Specific OT Treatment Interventions to include:   * Instruction/training on adapted ADL techniques and AE recommendations to increase functional independence within precautions       * Training on energy conservation strategies, correct breathing pattern and techniques to improve independence/tolerance for self-care routine  * Functional transfer/mobility training/DME recommendations for increased independence, safety, and fall prevention  * Patient/Family education to increase follow through with safety techniques and functional independence  * Recommendation of environmental modifications for increased safety with functional transfers/mobility and ADLs  * Therapeutic exercise to improve motor endurance, ROM, and functional strength for ADLs/functional transfers  * Therapeutic activities to facilitate/challenge dynamic balance, stand tolerance for increased safety and independence with ADLs  * Positioning to improve skin integrity, interaction with environment and functional independence    Recommended Adaptive Equipment: TBD at rehab     Home Living: pt came [facility] us from [facility]  Prior she resided alone, single family home; 2 story, 1+4 steps to enter with  no rail, tub shower.       Equipment owned: wheeled walker, cane, bedside commode, shower chair, knee scooter, hand held shower head     Prior Level of Function: Independent with ADLs , Independent with IADLs; ambulated with a wheeled walker    Pain Level: no pain at time of evaluation; Nursing notified.      Cognition: A&O: 4/4; Follows 2 step directions   Memory: good   Sequencing: good    Problem solving: good    Judgement/safety: good     [facility]    AM-PAC Daily Activity - Inpatient   How much help is needed for putting on and taking off regular lower body clothing?: A Lot  How much help is needed for bathing (which includes washing, rinsing,  drying)?: A Lot  How much help is needed for toileting (which includes using toilet, bedpan, or urinal)?: A Little  How much help is needed for putting on and taking off regular upper body clothing?: A Little  How much help is needed for taking care of personal grooming?: A Little  How much help for eating meals?: None  AM-PAC Inpatient Daily Activity Raw Score: 17  AM-PAC Inpatient ADL T-Scale Score : 37.26  ADL Inpatient CMS 0-100% Score: 50.11  ADL Inpatient CMS G-Code Modifier : CK     Functional Assessment:    Initial Eval Status  Date: 1/20/25 Treatment Status  Date: STGs = LTGs  Time frame: 10-14 days   Feeding Independent   Independent    Grooming Supervision   Independent    UB Dressing Minimal Assist for gown management  Independent    LB Dressing Moderate Assist    Independent    Bathing Moderate Assist, set up to sponge bathe while seated in bedside chair.  Modified Magdalena    Toileting Supervision for hygiene and to stand at sink level to wash and dry hands.   Independent    Bed Mobility  Supine to sit: Supervision   Sit to supine: N/T as pt as up in chair   Rolling:N/T     Supine to sit: Independent   Sit to supine: Independent   Rolling:Independent     Functional Transfers Minimal Assist from EOB.  Supervision for transfer to and from low commode with Minimal verbal cues to use grab bar for safe commode transfer.   Supervision for transfer from standing to beside chair.   Transfer training with verbal cues for hand placement throughout session to improve safety.   Independent    Functional Mobility Minimal Assist with hand held assist and IV pole to improve balance to and from bathroom, verbal cues for sequence and safety.   Modified Magdalena    Balance Sitting:     Static: good     Dynamic: fair   Standing: fair  with IV pole and hand held assist  Sitting:     Static: good     Dynamic: good   Standing: good  with wheeled walker   Activity Tolerance Fair   good    Visual/  Perceptual Glasses:

## 2025-01-20 NOTE — PROGRESS NOTES
NAME: Velma Mazariegos  MR:  [id number]  :   1965  Admit Date:  2025    Elements of this note, were copied and pasted from Previous. Updates have been made where noted and reflect current exam and medical decision making from the DOS of this encounter.  CHIEF COMPLAINT       Chief Complaint   Patient presents with    Abdominal Pain     Pt comes to the ED from [facility] with c/o post op abdominal pain with nausea and vomiting. Pt had recent bowel resection surgery on 25 and had colostomy placed.      HISTORY OF PRESENT ILLNESS     Velma Mazariegos is a 59 y.o. female admitted on 2025 and has  has a past medical history of Arthritis, Asthma, Diabetes mellitus (HCC), Sleep apnea, and Thyroid disease.     This is a face to face encounter   25    Patient is tolerating medications. No reported adverse drug reactions.  Available labs, imaging studies, microbiologic studies have been reviewed with pt and family if present.    Assessment & Plan     Admitted for   Peritonitis (HCC) [K65.9]  Fever, unspecified fever cause [R50.9]  Leak of anastomosis between gastrointestinal structures [K91.89]    ID following for   Fevers 100.4   Pelvic abscess VRE Candida  Diverticular disease  H/o E coli/Strep 2024  Can not r/o peritonitis  add GN coverage  E coli bacteruria   Check procal rvp   Us rle  for dvt  Check cpk   Antimicrobials:    fluconazole (DIFLUCAN) tablet 400 mg, Daily     DAPTOmycin (CUBICIN) 500 mg in sodium chloride (PF) 0.9 % 10 mL IV syringe, Q24H     ceFEPIme (MAXIPIME) 2,000 mg in sodium chloride 0.9 % 100 mL IVPB (mini-bag), Q12H            REVIEW OF SYSTEMS     As stated above      PHYSICAL EXAMINATION    /73   Pulse 90   Temp 98.1 °F (36.7 °C) (Oral)   Resp 16   Ht 1.575 m (5' 2\") Comment: stated  Wt 97.2 kg (214 lb 4.8 oz)   SpO2 99%   BMI 39.20 kg/m²   Temp  Av.2 °F (37.3 °C)  Min: 98.1 °F (36.7 °C)  Max: 100.4 °F (38 °C)  CONSTITUTIONAL:  IN chair no  Order Status: Completed Specimen: Urine, clean catch Updated: 01/20/25 0806     Specimen Description .CLEAN CATCH URINE     Special Requests Site: Urine     Culture ESCHERICHIA COLI 50 ,000 CFU/ML Susceptibility to follow.    Culture, Blood 1 [[id number]] Collected: 01/18/25 2148    Order Status: Completed Specimen: Blood Updated: 01/20/25 0203     Specimen Description .BLOOD     Special Requests          Culture NO GROWTH 1 DAY    Culture, Blood 2 [[id number]] Collected: 01/18/25 2148    Order Status: Completed Specimen: Blood Updated: 01/20/25 0203     Specimen Description .BLOOD     Special Requests          Culture NO GROWTH 1 DAY    Culture, Anaerobic [[id number]] Collected: 01/06/25 1558    Order Status: Completed Specimen: Body Fluid Updated: 01/10/25 1158     Specimen Description .FLUID     Special Requests Site: Body Fluid     Culture NO ANAEROBIC ORGANISMS ISOLATED    Culture, Fungus [[id number]]  (Abnormal)  (Susceptibility) Collected: 01/06/25 1558    Order Status: Completed Specimen: Body Fluid Updated: 01/12/25 1113     Specimen Description .FLUID     Special Requests Site: Body Fluid     Direct Exam NO FUNGAL ELEMENTS SEEN     Culture ABBY ALBICANS LIGHT GROWTH    Susceptibility        Abby albicans      FUNGAL SUSCEPTIBILITY (Preliminary)      Casofungin <=0.12  Sensitive      Fluconazole <=0.5  Sensitive      Micafungin <=0.06  Sensitive      Voriconazole <=0.12  Sensitive                           Gram Stain [[id number]] Collected: 01/06/25 1558    Order Status: Canceled Specimen: Body Fluid     Culture, Surgical [[id number]]  (Abnormal)  (Susceptibility) Collected: 01/06/25 1558    Order Status: Completed Specimen: Body Fluid Updated: 01/09/25 0704     Specimen Description .ABSCESS Pelvic     Special Requests Pelvic Abscess     Direct Exam Gram stain performed by tissue touch prep      MODERATE Polymorphonuclear leukocytes      NO EPITHELIAL CELLS      RARE GRAM POSITIVE COCCI

## 2025-01-20 NOTE — CONSULTS
Comprehensive Nutrition Assessment    Type and Reason for Visit:  Initial, Wound, Consult    Nutrition Recommendations/Plan:   Continue Current Diet (Full Liquid)  Continue ONS (high garth/high protein) TID  3.   Continue to monitor nutrition progression and provide recommendations as indicated/medically appropriate  4.    Consult RD as needed      Malnutrition Assessment:  Malnutrition Status:  At risk for malnutrition (01/20/25 1331)    Context:  Chronic Illness     Findings of the 6 clinical characteristics of malnutrition:  Energy Intake:  75% or less estimated energy requirements for 1 month or longer  Weight Loss:  No weight loss     Body Fat Loss:  No body fat loss     Muscle Mass Loss:  No muscle mass loss    Fluid Accumulation:  Mild Extremities   Strength:  Not Performed    Nutrition Assessment:    Pt admit w/ diverticulitits w/ perforation and abscess. Pt s/p robo sigmoidectomy 1/2/25 complicated by anastomotic leak, now s/p diagnostic laparoscopy converted to open, partial colon resection, end colostomy, and retention suture placement 1/6. Pt was discharged 01/18/25 to a facility once diet was tolerated and pain controlled. PMHx: Arthritis, Asthma, DM, KATHERINE, Hypothyroid. Pt w/ poor po intake 2/2 abd pain w/ n/v x 5 days. ONS ordered per MD, continue TID, continue inpatient monitoring, nutrition progression, f/up per policy.    Nutrition Related Findings:    A/O x4, I/O +570 since admit, abd distended, BS hypo x4, LLQ Ostomy, +1 edema, e'lytes wnl Wound Type: Surgical Incision, Open Wounds (R-buttock)       Current Nutrition Intake & Therapies:    Average Meal Intake: 0%  Average Supplements Intake: 51-75%  ADULT DIET; Full Liquid  ADULT ORAL NUTRITION SUPPLEMENT; Lunch, Breakfast, Dinner; Standard High Calorie/High Protein Oral Supplement    Anthropometric Measures:  Height: 157.5 cm (5' 2.01\")  Ideal Body Weight (IBW): 110 lbs (50 kg)    Admission Body Weight: 97.2 kg (214 lb 4.6 oz) (01/20/25 standing

## 2025-01-20 NOTE — CARE COORDINATION
Case Management Assessment  Initial Evaluation    Date/Time of Evaluation: 1/20/2025 1:27 PM  Assessment Completed by: CÉSAR Rios    If patient is discharged prior to next notation, then this note serves as note for discharge by case management.    Patient Name: Velma Mazariegos                   YOB: 1965  Diagnosis: Peritonitis (HCC) [K65.9]  Fever, unspecified fever cause [R50.9]  Leak of anastomosis between gastrointestinal structures [K91.89]                   Date / Time: 1/18/2025  7:49 PM    Patient Admission Status: Inpatient   Readmission Risk (Low < 19, Mod (19-27), High > 27): Readmission Risk Score: 19    Current PCP: Niall Oconnor MD  PCP verified by CM? Yes    Chart Reviewed: Yes      History Provided by: Patient  Patient Orientation: Alert and Oriented    Patient Cognition: Alert    Hospitalization in the last 30 days (Readmission):  Yes    Readmission Assessment  Number of Days since last admission?: 1-7 days  Previous Disposition: SNF  Who is being Interviewed: Patient  What was the patient's/caregiver's perception as to why they think they needed to return back to the hospital?: Did not realize care needs would be so extensive  Did you visit your Primary Care Physician after you left the hospital, before you returned this time?: Yes  Did you see a specialist, such as Cardiac, Pulmonary, Orthopedic Physician, etc. after you left the hospital?: No  Who advised the patient to return to the hospital?: Skilled Unit  Does the patient report anything that got in the way of taking their medications?: No  In our efforts to provide the best possible care to you and others like you, can you think of anything that we could have done to help you after you left the hospital the first time, so that you might not have needed to return so soon?: Other (Comment) (Patient wants another SNF at [place].)      Advance Directives:      Code Status: Full Code   Patient's Primary

## 2025-01-20 NOTE — PROGRESS NOTES
Comprehensive Nutrition Assessment    Type and Reason for Visit:  Initial, Positive nutrition screen (MST 2)    Nutrition Recommendations/Plan:   Continue Current Diet (Full Liquid)  Continue ONS (high garth/high protein) TID  3.   Continue to monitor nutrition progression and provide recommendations as indicated/medically appropriate  4.   Consult RD as needed     Malnutrition Assessment:  Malnutrition Status:  At risk for malnutrition (01/20/25 1331)    Context:  Chronic Illness     Findings of the 6 clinical characteristics of malnutrition:  Energy Intake:  75% or less estimated energy requirements for 1 month or longer  Weight Loss:  No weight loss     Body Fat Loss:  No body fat loss     Muscle Mass Loss:  No muscle mass loss    Fluid Accumulation:  Mild Extremities   Strength:  Not Performed    Nutrition Assessment:    Pt admit w/ diverticulitits w/ perforation and abscess. Pt s/p robo sigmoidectomy 1/2/25 complicated by anastomotic leak, now s/p diagnostic laparoscopy converted to open, partial colon resection, end colostomy, and retention suture placement 1/6. Pt was discharged 01/18/25 to a facility once diet was tolerated and pain controlled. PMHx: Arthritis, Asthma, DM, KATHERINE, Hypothyroid. Pt w/ poor po intake 2/2 abd pain w/ n/v x 5 days. ONS ordered per MD, continue TID, continue inpatient monitoring, nutrition progression, f/up per policy.    Nutrition Related Findings:    A/O x4, I/O +570 since admit, abd distended, BS hypo x4, LLQ Ostomy, +1 edema, e'lytes wnl Wound Type: Surgical Incision, Open Wounds (R-buttock)       Current Nutrition Intake & Therapies:    Average Meal Intake: 0%  Average Supplements Intake: 51-75%  ADULT DIET; Full Liquid  ADULT ORAL NUTRITION SUPPLEMENT; Lunch, Breakfast, Dinner; Standard High Calorie/High Protein Oral Supplement    Anthropometric Measures:  Height: 157.5 cm (5' 2.01\")  Ideal Body Weight (IBW): 110 lbs (50 kg)    Admission Body Weight: 97.2 kg (214 lb 4.6 oz)

## 2025-01-20 NOTE — PROGRESS NOTES
Visit Date: 1/16/25  Velma Mazariegos  1965  female 59 y.o.      Subjective:    CC: Patient presents with Peritoneal abscess. Patient presents for follow up of Asthma, DM2, and hypothyroidism.    Asthma  She complains of cough, shortness of breath and wheezing. This is a chronic problem. The current episode started more than 1 year ago. The problem occurs daily. The problem has been gradually improving. The cough is non-productive. Associated symptoms include dyspnea on exertion and postnasal drip. Pertinent negatives include no appetite change, chest pain, ear pain, fever, headaches, myalgias, sore throat or trouble swallowing. Her symptoms are aggravated by strenuous activity. Her symptoms are alleviated by steroid inhaler. She reports moderate improvement on treatment. Her symptoms are not alleviated by OTC cough suppressant and change in position. Risk factors for lung disease include smoking/tobacco exposure. Her past medical history is significant for asthma and emphysema.   Thyroid Problem  Presents for follow-up visit. Symptoms include anxiety. Patient reports no cold intolerance, constipation, diarrhea, palpitations or tremors. The symptoms have been stable.   Diabetes  She presents for her follow-up diabetic visit. She has type 2 diabetes mellitus. Her disease course has been fluctuating. Hypoglycemia symptoms include nervousness/anxiousness. Pertinent negatives for hypoglycemia include no dizziness, headaches or tremors. Pertinent negatives for diabetes include no chest pain, no polydipsia, no polyphagia and no polyuria. There are no hypoglycemic complications. Symptoms are improving. There are no diabetic complications. Risk factors for coronary artery disease include obesity, post-menopausal and tobacco exposure. Current diabetic treatment includes oral agent (dual therapy). She is compliant with treatment most of the time. Her weight is stable. She is following a low fat/cholesterol diet. When

## 2025-01-21 ENCOUNTER — HOSPITAL ENCOUNTER (OUTPATIENT)
Dept: WOUND CARE | Age: 60
Discharge: HOME | End: 2025-01-21

## 2025-01-21 LAB
ALBUMIN SERPL-MCNC: 2.4 G/DL (ref 3.5–5.2)
ALP SERPL-CCNC: 97 U/L (ref 35–104)
ALT SERPL-CCNC: 8 U/L (ref 0–32)
ANION GAP SERPL CALCULATED.3IONS-SCNC: 8 MMOL/L (ref 7–16)
AST SERPL-CCNC: 12 U/L (ref 0–31)
BASOPHILS # BLD: 0.05 K/UL (ref 0–0.2)
BASOPHILS NFR BLD: 1 % (ref 0–2)
BILIRUB SERPL-MCNC: 0.2 MG/DL (ref 0–1.2)
BUN SERPL-MCNC: 11 MG/DL (ref 6–23)
CALCIUM SERPL-MCNC: 8.1 MG/DL (ref 8.6–10.2)
CHLORIDE SERPL-SCNC: 101 MMOL/L (ref 98–107)
CO2 SERPL-SCNC: 27 MMOL/L (ref 22–29)
CREAT SERPL-MCNC: 0.7 MG/DL (ref 0.5–1)
EKG ATRIAL RATE: 85 BPM
EKG P AXIS: 65 DEGREES
EKG P-R INTERVAL: 166 MS
EKG Q-T INTERVAL: 396 MS
EKG QRS DURATION: 90 MS
EKG QTC CALCULATION (BAZETT): 471 MS
EKG R AXIS: -21 DEGREES
EKG T AXIS: 53 DEGREES
EKG VENTRICULAR RATE: 85 BPM
EOSINOPHIL # BLD: 0.59 K/UL (ref 0.05–0.5)
EOSINOPHILS RELATIVE PERCENT: 8 % (ref 0–6)
ERYTHROCYTE [DISTWIDTH] IN BLOOD BY AUTOMATED COUNT: 17.3 % (ref 11.5–15)
GFR, ESTIMATED: >90 ML/MIN/1.73M2
GLUCOSE SERPL-MCNC: 115 MG/DL (ref 74–99)
HCT VFR BLD AUTO: 29.5 % (ref 34–48)
HGB BLD-MCNC: 8.9 G/DL (ref 11.5–15.5)
IMM GRANULOCYTES # BLD AUTO: 0.06 K/UL (ref 0–0.58)
IMM GRANULOCYTES NFR BLD: 1 % (ref 0–5)
LYMPHOCYTES NFR BLD: 1.56 K/UL (ref 1.5–4)
LYMPHOCYTES RELATIVE PERCENT: 21 % (ref 20–42)
MCH RBC QN AUTO: 27.2 PG (ref 26–35)
MCHC RBC AUTO-ENTMCNC: 30.2 G/DL (ref 32–34.5)
MCV RBC AUTO: 90.2 FL (ref 80–99.9)
MONOCYTES NFR BLD: 0.73 K/UL (ref 0.1–0.95)
MONOCYTES NFR BLD: 10 % (ref 2–12)
NEUTROPHILS NFR BLD: 60 % (ref 43–80)
NEUTS SEG NFR BLD: 4.4 K/UL (ref 1.8–7.3)
PLATELET # BLD AUTO: 711 K/UL (ref 130–450)
PMV BLD AUTO: 8.3 FL (ref 7–12)
POTASSIUM SERPL-SCNC: 3.6 MMOL/L (ref 3.5–5)
PROT SERPL-MCNC: 6.3 G/DL (ref 6.4–8.3)
RBC # BLD AUTO: 3.27 M/UL (ref 3.5–5.5)
SODIUM SERPL-SCNC: 136 MMOL/L (ref 132–146)
WBC OTHER # BLD: 7.4 K/UL (ref 4.5–11.5)

## 2025-01-21 PROCEDURE — 6360000002 HC RX W HCPCS: Performed by: STUDENT IN AN ORGANIZED HEALTH CARE EDUCATION/TRAINING PROGRAM

## 2025-01-21 PROCEDURE — 6360000002 HC RX W HCPCS: Performed by: SPECIALIST

## 2025-01-21 PROCEDURE — 97530 THERAPEUTIC ACTIVITIES: CPT

## 2025-01-21 PROCEDURE — 94660 CPAP INITIATION&MGMT: CPT

## 2025-01-21 PROCEDURE — 2580000003 HC RX 258: Performed by: STUDENT IN AN ORGANIZED HEALTH CARE EDUCATION/TRAINING PROGRAM

## 2025-01-21 PROCEDURE — 2500000003 HC RX 250 WO HCPCS: Performed by: STUDENT IN AN ORGANIZED HEALTH CARE EDUCATION/TRAINING PROGRAM

## 2025-01-21 PROCEDURE — 6370000000 HC RX 637 (ALT 250 FOR IP): Performed by: STUDENT IN AN ORGANIZED HEALTH CARE EDUCATION/TRAINING PROGRAM

## 2025-01-21 PROCEDURE — 36415 COLL VENOUS BLD VENIPUNCTURE: CPT

## 2025-01-21 PROCEDURE — 80053 COMPREHEN METABOLIC PANEL: CPT

## 2025-01-21 PROCEDURE — 2580000003 HC RX 258: Performed by: SPECIALIST

## 2025-01-21 PROCEDURE — 97535 SELF CARE MNGMENT TRAINING: CPT

## 2025-01-21 PROCEDURE — 85025 COMPLETE CBC W/AUTO DIFF WBC: CPT

## 2025-01-21 PROCEDURE — 1200000000 HC SEMI PRIVATE

## 2025-01-21 RX ORDER — METHOCARBAMOL 750 MG/1
750 TABLET, FILM COATED ORAL 4 TIMES DAILY
Qty: 40 TABLET | Refills: 0 | Status: SHIPPED | OUTPATIENT
Start: 2025-01-21 | End: 2025-01-24

## 2025-01-21 RX ORDER — OXYCODONE AND ACETAMINOPHEN 5; 325 MG/1; MG/1
1 TABLET ORAL EVERY 6 HOURS PRN
Qty: 15 TABLET | Refills: 0 | Status: SHIPPED | OUTPATIENT
Start: 2025-01-21 | End: 2025-01-24

## 2025-01-21 RX ADMIN — METOCLOPRAMIDE HYDROCHLORIDE 10 MG: 5 INJECTION, SOLUTION INTRAMUSCULAR; INTRAVENOUS at 04:18

## 2025-01-21 RX ADMIN — SODIUM CHLORIDE, PRESERVATIVE FREE 10 ML: 5 INJECTION INTRAVENOUS at 19:41

## 2025-01-21 RX ADMIN — ENOXAPARIN SODIUM 40 MG: 100 INJECTION SUBCUTANEOUS at 08:19

## 2025-01-21 RX ADMIN — METHOCARBAMOL TABLETS 750 MG: 500 TABLET, COATED ORAL at 12:45

## 2025-01-21 RX ADMIN — CEFEPIME 2000 MG: 2 INJECTION, POWDER, FOR SOLUTION INTRAVENOUS at 12:41

## 2025-01-21 RX ADMIN — DULOXETINE HYDROCHLORIDE 60 MG: 60 CAPSULE, DELAYED RELEASE ORAL at 19:39

## 2025-01-21 RX ADMIN — KETOROLAC TROMETHAMINE 30 MG: 30 INJECTION, SOLUTION INTRAMUSCULAR at 15:58

## 2025-01-21 RX ADMIN — OXYCODONE 10 MG: 5 TABLET ORAL at 12:46

## 2025-01-21 RX ADMIN — MONTELUKAST 10 MG: 10 TABLET, FILM COATED ORAL at 19:40

## 2025-01-21 RX ADMIN — METOCLOPRAMIDE HYDROCHLORIDE 10 MG: 5 INJECTION, SOLUTION INTRAMUSCULAR; INTRAVENOUS at 15:58

## 2025-01-21 RX ADMIN — METHOCARBAMOL TABLETS 750 MG: 500 TABLET, COATED ORAL at 15:57

## 2025-01-21 RX ADMIN — METOCLOPRAMIDE HYDROCHLORIDE 10 MG: 5 INJECTION, SOLUTION INTRAMUSCULAR; INTRAVENOUS at 19:40

## 2025-01-21 RX ADMIN — KETOROLAC TROMETHAMINE 30 MG: 30 INJECTION, SOLUTION INTRAMUSCULAR at 21:38

## 2025-01-21 RX ADMIN — SODIUM CHLORIDE, PRESERVATIVE FREE 10 ML: 5 INJECTION INTRAVENOUS at 09:44

## 2025-01-21 RX ADMIN — SODIUM CHLORIDE 40 MG: 9 INJECTION INTRAMUSCULAR; INTRAVENOUS; SUBCUTANEOUS at 09:34

## 2025-01-21 RX ADMIN — METHOCARBAMOL TABLETS 750 MG: 500 TABLET, COATED ORAL at 08:16

## 2025-01-21 RX ADMIN — KETOROLAC TROMETHAMINE 30 MG: 30 INJECTION, SOLUTION INTRAMUSCULAR at 09:35

## 2025-01-21 RX ADMIN — DOCUSATE SODIUM 100 MG: 100 CAPSULE, LIQUID FILLED ORAL at 19:40

## 2025-01-21 RX ADMIN — CEFEPIME 2000 MG: 2 INJECTION, POWDER, FOR SOLUTION INTRAVENOUS at 00:28

## 2025-01-21 RX ADMIN — FLUCONAZOLE 400 MG: 100 TABLET ORAL at 08:17

## 2025-01-21 RX ADMIN — LEVOTHYROXINE SODIUM 88 MCG: 0.09 TABLET ORAL at 05:21

## 2025-01-21 RX ADMIN — METOCLOPRAMIDE HYDROCHLORIDE 10 MG: 5 INJECTION, SOLUTION INTRAMUSCULAR; INTRAVENOUS at 09:35

## 2025-01-21 RX ADMIN — DOCUSATE SODIUM 100 MG: 100 CAPSULE, LIQUID FILLED ORAL at 08:16

## 2025-01-21 RX ADMIN — KETOROLAC TROMETHAMINE 30 MG: 30 INJECTION, SOLUTION INTRAMUSCULAR at 04:18

## 2025-01-21 RX ADMIN — DAPTOMYCIN 500 MG: 500 INJECTION, POWDER, LYOPHILIZED, FOR SOLUTION INTRAVENOUS at 09:33

## 2025-01-21 RX ADMIN — METHOCARBAMOL TABLETS 750 MG: 500 TABLET, COATED ORAL at 19:39

## 2025-01-21 ASSESSMENT — PAIN SCALES - GENERAL
PAINLEVEL_OUTOF10: 10
PAINLEVEL_OUTOF10: 0
PAINLEVEL_OUTOF10: 6
PAINLEVEL_OUTOF10: 5
PAINLEVEL_OUTOF10: 9
PAINLEVEL_OUTOF10: 5

## 2025-01-21 ASSESSMENT — PAIN DESCRIPTION - ORIENTATION: ORIENTATION: RIGHT;LEFT

## 2025-01-21 ASSESSMENT — PAIN DESCRIPTION - LOCATION
LOCATION: ABDOMEN

## 2025-01-21 ASSESSMENT — PAIN DESCRIPTION - PAIN TYPE
TYPE: SURGICAL PAIN
TYPE: SURGICAL PAIN

## 2025-01-21 ASSESSMENT — PAIN DESCRIPTION - DESCRIPTORS
DESCRIPTORS: DISCOMFORT
DESCRIPTORS: DISCOMFORT
DESCRIPTORS: ACHING;STABBING;SORE

## 2025-01-21 NOTE — PROGRESS NOTES
Physical Therapy Treatment Note/Plan of Care    Room #:  0318/0318-01  Patient Name: Velma Mazariegos  YOB: 1965  MRN: [id number]    Date of Service: 1/21/2025     Tentative placement recommendation: Subacute Rehab  Equipment recommendation: Equipment at Nursing Home      Evaluating Physical Therapist: Ted Lanza, PT  #[id number]      Specific Provider Orders/Date/Referring Provider :     PT eval and treat  Start:  01/19/25 0745,   End:  01/19/25 0745,   ONE TIME,   Standing Count:  1 Occurrences,   R       Norm Carrillo DO    Admitting Diagnosis:   Peritonitis (HCC) [K65.9]  Fever, unspecified fever cause [R50.9]  Leak of anastomosis between gastrointestinal structures [K91.89]    Admitted with    tachycardia and above  Surgery: status post 1/2/25 laparoscopic LAR and drainage of pelvic abscess for complicated diverticulitis complicated by anastomosis leak followed by exploratory laparotomy, drainage of pelvic abscess, flexible sigmoidoscopy, and retention sutures on POD4 1/6/25  Visit Diagnoses         Codes    Peritonitis (HCC)    -  Primary K65.9    Fever, unspecified fever cause     R50.9    Leg edema, right     R60.0    Postoperative pain     G89.18            Patient Active Problem List   Diagnosis    Asthma exacerbation    Nicotine addiction    Left ankle sprain    Diverticulitis of colon    Abscess    Right lower quadrant abdominal pain    Colonic diverticular abscess    Decubitus ulcer of right buttock, stage 3 (HCC)    Diabetes mellitus type 2, noninsulin dependent (HCC)    Diverticulitis with perforation and abscess    Colocutaneous fistula    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)    Diverticulitis large intestine w/o perforation or abscess w/o bleeding    Leak of anastomosis between gastrointestinal structures        ASSESSMENT of Current Deficits Patient exhibits decreased strength, balance, and endurance impairing functional mobility,    Pain level   8/10    6/10  abdomen    Bed Mobility  Using rails and head of bed elevated:     Rolling: Minimal assist of 1    Supine to sit: Minimal assist of 1    Sit to supine: Minimal assist of 1    Scooting: Minimal assist of 1   Using rails and head of bed elevated:     Rolling: Supervision    Supine to sit: Supervision    Sit to supine: Not assessed patient in chair   Scooting: Supervision     Rolling: Independent    Supine to sit: Independent    Sit to supine: Independent    Scooting: Independent     Transfers Sit to stand: Minimal assist of 1 from bed and Bedside commode  Sit to stand: Minimal assist of 1    Sit to stand: Independent     Ambulation     2x5 feet using  hand held assist with Minimal assist of 1   for balance and multiplane instability   2 x 140 feet using  hand held assist with Minimal assist of 1   cues for safety and pacing     75 feet using  least restrictive device versus no device with Independent    Stair negotiation: ascended and descended   Not assessed     5 steps, without rail, Supervision       ROM Within functional limits    Increase range of motion 10% of affected joints    Strength BUE:   3+/5  RLE:  3+/5  LLE:  3+/5  Increase strength in affected mm groups by 1/3 grade   Balance Sitting EOB:  fair    Dynamic Standing:  fair minus Sitting EOB: good   Dynamic Standing: fair using HHA   Sitting EOB:  good    Dynamic Standing: good       Patient is Alert & Oriented x person, place, time, and situation and follows directions    Sensation:  Patient  denies numbness/tingling   Edema:  none noted   Endurance: poor         Patient education  Patient educated on role of Physical Therapy, risks of immobility, safety and plan of care, importance of positional changes for oxygen exchange,  importance of mobility while in hospital , safety , and positioning for skin integrity and comfort     Patient response to education:   Pt verbalized understanding Pt demonstrated skill Pt requires

## 2025-01-21 NOTE — PROGRESS NOTES
Spiritual Health History and Assessment/Progress Note  [facility]    (P) Initial Encounter,  ,  ,      Name: Vlema Mazariegos MRN: [id number]    Age: 59 y.o.     Sex: female   Language: English   [employer]: [employer]ism   Diverticulitis of large intestine with perforation and abscess     Date: 1/21/2025                           Spiritual Assessment began in [facility] SURG        Referral/Consult From: (P) Rounding   Encounter Overview/Reason: (P) Initial Encounter  Service Provided For: (P) Patient    Kari, Belief, Meaning:   Patient is connected with a kari tradition or spiritual practice  Family/Friends No family/friends present      Importance and Influence:  Patient has spiritual/personal beliefs that influence decisions regarding their health  Family/Friends No family/friends present    Community:  Patient feels well-supported. Support system includes: Children and Extended family  Family/Friends No family/friends present    Assessment and Plan of Care:     Patient Interventions include: Facilitated expression of thoughts and feelings and Affirmed coping skills/support systems  Family/Friends Interventions include: No family/friends present    Patient Plan of Care: Spiritual Care available upon further referral  Family/Friends Plan of Care: No family/friends present    Electronically signed by Chaplain José on 1/21/2025 at 10:48 AM

## 2025-01-21 NOTE — FLOWSHEET NOTE
ET Nurse  Admit Date: 1/18/2025  7:49 PM    Reason for consult:  colostomy    Stoma assessment:     01/21/25 1235   Colostomy LUQ   Placement Date/Time: 01/06/25 1931   Present on Admission/Arrival: No  Location: LUQ   Stomal Appliance 1 piece   Stoma  Assessment Pink;Other (Comment);Flush  (slough)   Peristomal Assessment Intact   Mucocutaneous Junction Separation  ([id number])   Treatment Stoma powder   Stool Appearance Soft   Stool Color Brown   Stool Amount Medium   Output (mL)   (100)           **Informed Consent**    The patient has given verbal consent to have photos taken of stoma site and inserted into their chart as part of their permanent medical record for purposes of documentation, treatment management and/or medical review.   All Images taken on 1/21/25 of patient name: Velma SCHMITT Proverbs were transmitted and stored on secured Epic  Site located within Media Folder Tab by a registered Epic-[employer] Mobile Application Device.      Impression: Stoma was dark on previous admission, currently sloughing off. Stool was present in pouch.  Dr. Carranza made aware.     Plan:  1 piece flat colosplast pouch  Brava ring  Brava strip  Stoma powder  Stoma paste    Khadra Morales RN 1/21/2025 1:43 PM

## 2025-01-21 NOTE — PROGRESS NOTES
NAME: Velma Mazariegos  MR:  [id number]  :   1965  Admit Date:  2025    Elements of this note, were copied and pasted from Previous. Updates have been made where noted and reflect current exam and medical decision making from the DOS of this encounter.  CHIEF COMPLAINT       Chief Complaint   Patient presents with    Abdominal Pain     Pt comes to the ED from [facility] with c/o post op abdominal pain with nausea and vomiting. Pt had recent bowel resection surgery on 25 and had colostomy placed.      HISTORY OF PRESENT ILLNESS     Velma Mazariegos is a 59 y.o. female admitted on 2025 and has  has a past medical history of Arthritis, Asthma, Diabetes mellitus (HCC), Sleep apnea, and Thyroid disease.     This is a face to face encounter   25 in bed has no c/o f/c on o2 denies uti sx    Patient is tolerating medications. No reported adverse drug reactions.  Available labs, imaging studies, microbiologic studies have been reviewed with pt and family if present.    Assessment & Plan     Admitted for   Peritonitis (HCC) [K65.9]  Fever, unspecified fever cause [R50.9]  Leak of anastomosis between gastrointestinal structures [K91.89]    ID following for   Fevers 100.4   Pelvic abscess VRE Candida  Diverticular disease  H/o E coli/Strep 2024  Can not r/o peritonitis  add GN coverage  Wound cx pending   E coli bacteruria   PSORIASIS  Check procal rvp   Us rle  for dvt NEG  Check cpk   Antimicrobials:    fluconazole (DIFLUCAN) tablet 400 mg, Daily     DAPTOmycin (CUBICIN) 500 mg in sodium chloride (PF) 0.9 % 10 mL IV syringe, Q24H     ceFEPIme (MAXIPIME) 2,000 mg in sodium chloride 0.9 % 100 mL IVPB (mini-bag), Q12H            REVIEW OF SYSTEMS     As stated above      PHYSICAL EXAMINATION    BP (!) 116/54   Pulse 84   Temp 98.4 °F (36.9 °C) (Oral)   Resp 18   Ht 1.575 m (5' 2.01\")   Wt 97.2 kg (214 lb 4.8 oz)   SpO2 97%   BMI 39.19 kg/m²   Temp  Av.2 °F (36.8 °C)  Min: 98 °F    Component Value Date    PROCAL 0.11 (H) 01/19/2025     Recent Labs     01/18/25  2148 01/19/25  0436 01/20/25  0417 01/21/25  0404   PROCAL  --  0.11*  --   --    AST 12  --  12 12   ALT 8  --  7 8         Radiology:  Vascular duplex lower extremity venous bilateral    Result Date: 1/19/2025  No evidence of DVT in either lower extremity.     CT ABDOMEN PELVIS W IV CONTRAST Additional Contrast? None    Result Date: 1/18/2025  1. Status post partial colectomy with left lower quadrant colostomy. No evidence of bowel obstruction. 2. Small volume intraperitoneal fluid with mild peritoneal thickening/enhancement. Findings may be postsurgical, however, peritonitis cannot be excluded. 3. Small foci of gas along the anterior abdomen, which appears extraperitoneal, possibly within the anterior abdominal wall. 4. Extensive subcutaneous emphysema, overall decreased since the prior examination. 5. Trace left pleural effusion.     XR CHEST PORTABLE    Result Date: 1/18/2025  1. No sign of acute cardiopulmonary disease. 2. Moderate soft tissue gas in the left breast or lateral lower left chest wall. Recommend correlation with the clinical history.     Imaging and labs were reviewed per medical records.     Thank you for involving me in the care of Velma Mazariegos I will continue to follow. Please do not hesitate to call [phone] for any questions or concerns.    Electronically signed by Keiko Hull MD on 1/21/2025 at 12:30 PM

## 2025-01-21 NOTE — PROGRESS NOTES
GENERAL SURGERY  DAILY PROGRESS NOTE    Patient's Name/Date of Birth: Velma Mazariegos / 1965    Date: 2025     Chief Complaint   Patient presents with    Abdominal Pain     Pt comes to the ED from [facility] with c/o post op abdominal pain with nausea and vomiting. Pt had recent bowel resection surgery on 25 and had colostomy placed.         Subjective:  Pain has significantly improved. States she is feeling better this morning. No nausea or vomiting.     Objective:  Last 24Hrs  Temp  Av.2 °F (36.8 °C)  Min: 98 °F (36.7 °C)  Max: 98.4 °F (36.9 °C)  Resp  Av  Min: 18  Max: 18  Pulse  Av.5  Min: 84  Max: 95  Systolic (24hrs), Av , Min:116 , Max:118     Diastolic (24hrs), Av, Min:54, Max:66    SpO2  Av.7 %  Min: 97 %  Max: 99 %    I/O last 3 completed shifts:  In: 2380 [P.O.:1680; I.V.:600; IV Piggyback:100]  Out: 800 [Urine:600; Stool:200]      General: In no acute distress, alert and oriented x4  Cardiovascular: Warm throughout, no edema  Respiratory: no respiratory distress, equal chest rise  Abdomen: soft, obese, abdomen wound open with retention sutures and packed with gauze, covered with dressings and tape, no strikethrough. LLQ ostomy w/ soft brown stool, non-distended  Skin: no obvious rashes or lesions appreciated, no jaundice  Extremities: atraumatic, no focal motor deficits, no open wounds      CBC  Recent Labs     25  2148 25  0355 25  0417 25  0404   WBC 7.1 6.1 7.4 7.4   RBC 3.74 3.44* 3.52 3.27*   HGB 10.6* 9.6* 9.8* 8.9*   HCT 33.6* 30.4* 31.5* 29.5*   MCV 89.8 88.4 89.5 90.2   MCH 28.3 27.9 27.8 27.2   MCHC 31.5* 31.6* 31.1* 30.2*   RDW 16.9* 17.1* 17.4* 17.3*   PLT 1,061*  --  831* 711*   MPV 8.0 8.2 8.3 8.3       CMP  Recent Labs     25  2148 25  0355 25  0417 25  0404    137 136  DUPLICATE ORDER 136   K 3.9 3.8 3.5  DUPLICATE ORDER 3.6   CL 98 100 100  DUPLICATE ORDER 101   CO2  25 25

## 2025-01-21 NOTE — PROGRESS NOTES
OCCUPATIONAL THERAPY TREATMENT NOTE    [facility]   [address] [facility]        Date:2025  Patient Name: Velma Mazariegos  MRN: [id number]  : 1965  Room: [address] [place] [address]         Evaluating OT: Velma Coronel OTR/L; [id number]      Referring Provider and Specific Provider Orders/Date:      25   OT eval and treat  Start:  25,   End:  25,   ONE TIME,   Standing Count:  1 Occurrences,   R         Norm Carrillo, DO       Placement Recommendation: Subacute         Diagnosis:   1. Peritonitis (HCC)    2. Fever, unspecified fever cause    3. Leg edema, right         Surgery: none        Pertinent Medical History:       Past Medical History        Past Medical History:   Diagnosis Date    Arthritis      Asthma      Diabetes mellitus (HCC)      Sleep apnea       cpap   #10    Thyroid disease              Past Surgical History         Past Surgical History:   Procedure Laterality Date    ANKLE SURGERY Left       screws in ankle    BLADDER SUSPENSION         SECTION         x3    COLECTOMY N/A 2025     DIAGNOSTIC LAPAROSCOPY CONVERTED TO EXPLORATORY LAPAROTOMY, PARTIAL COLON RESECTION, DRAINAGE OF PELVIC ABSCESS, FLEXIBLE SIGMOIDOSCOPY, END COLOSTOMY, RETENTION SUTURES performed by Juan David Carranza MD at [facility] OR    COLONOSCOPY        SMALL INTESTINE SURGERY N/A 2025     BOWEL RESECTION SIGMOID LAPAROSCOPIC ROBOTIC XI performed by Juan David Carranza MD at [facility] OR          Precautions:  Fall Risk, recently colostomy      Assessment of current deficits:     [x] Functional mobility            [x]ADLs           [x] Strength                   []Cognition    [x] Functional transfers          [x] IADLs          [] Safety Awareness   [x]Endurance    [] Fine Coordination              [x] Balance      [] Vision/perception    []Sensation      []Gross Motor Coordination  [] ROM           [] Delirium                    taking off regular lower body clothing?: A Lot  How much help is needed for bathing (which includes washing, rinsing, drying)?: A Lot  How much help is needed for toileting (which includes using toilet, bedpan, or urinal)?: A Little  How much help is needed for putting on and taking off regular upper body clothing?: A Little  How much help is needed for taking care of personal grooming?: A Little  How much help for eating meals?: None  AM-PAC Inpatient Daily Activity Raw Score: 18  AM-PAC Inpatient ADL T-Scale Score : 37.26  ADL Inpatient CMS 0-100% Score: 50.11  ADL Inpatient CMS G-Code Modifier : CK                Functional Assessment:     Initial Eval Status  Date: 1/20/25 Treatment Status  Date: 1/21/25 STGs = LTGs  Time frame: 10-14 days   Feeding Independent  Independent  Independent    Grooming Supervision  Supervision seated EOB  Independent    UB Dressing Minimal Assist for gown management MIN A to abigail/doff [facility] gown  Independent    LB Dressing Moderate Assist   MIN A to abigail/doff socks using  shoe horn and sock aid v/c's for technique  Independent    Bathing Moderate Assist, set up to sponge bathe while seated in bedside chair. N/t; v/c's for DME, bathing AE  Modified [place]    Toileting Supervision for hygiene and to stand at sink level to wash and dry hands.  N/t  Independent    Bed Mobility  Supine to sit: Supervision   Sit to supine: N/T as pt as up in chair   Rolling:N/T    Supine<>sit supervision  Supine to sit: Independent   Sit to supine: Independent   Rolling:Independent     Functional Transfers Minimal Assist from EOB.  Supervision for transfer to and from low commode with Minimal verbal cues to use grab bar for safe commode transfer.   Supervision for transfer from standing to beside chair.   Transfer training with verbal cues for hand placement throughout session to improve safety.  Supervision sit<>stand from EOB no AD Independent    Functional Mobility Minimal Assist with hand  held assist and IV pole to improve balance to and from bathroom, verbal cues for sequence and safety.  MIN A no AD less than house hold distances  Modified [place]    Balance Sitting:     Static: good     Dynamic: fair   Standing: fair  with IV pole and hand held assist Sitting:  Static: supervision   Dynamic: supervision   Standing: supervision  Sitting:     Static: good     Dynamic: good   Standing: good  with wheeled walker   Activity Tolerance Fair  Fair  good    Visual/  Perceptual Glasses: yes                        Hand Dominance: right        AROM (PROM) Strength Additional Info:  Goal:   RUE  WFL 4/5 good  and wfl FMC/dexterity noted during ADL tasks    Improve overall RUE strength  for participation in functional tasks   LUE WFL 4/5 good  and wfl FMC/dexterity noted during ADL tasks    Improve overall LUE strength  for participation in functional tasks         Comments: Upon arrival pt supine in bed, agreeable to therapy session. Pt educated with regards to bed mobility, functional transfers, functional mobility, LE dressing AE, bathing AE, DME. At end of session pt supine in bed,  all lines and tubes intact, call light within reach.     Pt has made fair  progress towards set goals.   Continue with current plan of care      Treatment Time In:1321            Treatment Time Out: 1354                Treatment Charges: Mins Units   Ther Ex  51420     Manual Therapy 42948     Thera Activities 30300 16 1   ADL/Home Mgt 22300 17 1   Neuro Re-ed 59078     Group Therapy      Orthotic manage/training  46811     Non-Billable Time     Total Timed Treatment 33 2       Treatment Time additionally includes review of current medical information, gathering information on past medical history/social history and prior level of function, interpretation of standardized testing/informal observation of tasks, treatment for plan of care and goals.     Brant ROY/KESHIA [id number]

## 2025-01-21 NOTE — PROGRESS NOTES
Internal Medicine Progress Note     [facility]     Paul Avery D.O., AIMEEIMarkell Tian D.O., RICHOHAILEY Castrejon D.O.     Mili Hays, MSN, APRN, NP-C  Filemon Rosales, MSN, APRN-CNP  Remington Forte, MSN, APRN, NP-C  Christin Mary, MSN, APRN-CNP  Xochilt Bach, MSN, APRN, NP-C     Primary Care Physician: Niall Oconnor MD   Admitting Physician:  No admitting provider for patient encounter.  Admission date and time: 1/18/2025  7:49 PM    Room:  [address] [place] [address]  Admitting diagnosis: Peritonitis (HCC) [K65.9]  Fever, unspecified fever cause [R50.9]  Leak of anastomosis between gastrointestinal structures [K91.89]    Patient Name: Velma Mazariegos  MRN: [id number]    Date of Service: 1/21/2025     Subjective:  Velma is a 59 y.o. female who was seen and examined today,1/21/2025, at the bedside. Velma continues to improve on a daily basis.  She is tolerating a diet with adequate ostomy output.  Retention sutures will be removed today as per the surgery team.  We are awaiting acceptance and precertification to skilled nursing facility.  She is tolerating antibiotics as per the infectious disease team.    Review of System:   Constitutional:   Denies fever or chills, weight loss or gain, fatigue or malaise.  HEENT:   Denies ear pain, sore throat, sinus or eye problems.  Cardiovascular:   Denies any chest pain, irregular heartbeats, or palpitations.   Respiratory:   Denies shortness of breath, coughing, sputum production, hemoptysis, or wheezing.  Gastrointestinal:   Postoperative abdominal pain as to be expected.  Tolerating a diet.  Having adequate ostomy output.  Genitourinary:    Denies any urgency, frequency, hematuria. Voiding  without difficulty.  Extremities:   Denies lower extremity swelling, edema or cyanosis.   Neurology:    Denies any headache or focal neurological deficits, improving weakness and deconditioning.  Psch:   Denies being  color and texture.  Genitalia/Breast:  Deferred    Medication:  Scheduled Meds:   ketorolac  30 mg IntraVENous Q6H    levothyroxine  88 mcg Oral Daily    montelukast  10 mg Oral Nightly    DULoxetine  60 mg Oral Nightly    docusate sodium  100 mg Oral BID    methocarbamol  750 mg Oral 4x Daily    fluconazole  400 mg Oral Daily    sodium chloride flush  10 mL IntraVENous 2 times per day    pantoprazole (PROTONIX) 40 mg in sodium chloride (PF) 0.9 % 10 mL injection  40 mg IntraVENous Daily    enoxaparin  40 mg SubCUTAneous Daily    DAPTOmycin (CUBICIN) 500 mg in sodium chloride (PF) 0.9 % 10 mL IV syringe  500 mg IntraVENous Q24H    metoclopramide  10 mg IntraVENous Q6H    ceFEPIme (MAXIPIME) 2,000 mg in sodium chloride 0.9 % 100 mL IVPB (mini-bag)  2,000 mg IntraVENous Q12H     Continuous Infusions:   sodium chloride         Objective Data:  CBC with Differential:    Lab Results   Component Value Date/Time    WBC 7.4 01/21/2025 04:04 AM    RBC 3.27 01/21/2025 04:04 AM    HGB 8.9 01/21/2025 04:04 AM    HCT 29.5 01/21/2025 04:04 AM     01/21/2025 04:04 AM    MCV 90.2 01/21/2025 04:04 AM    MCH 27.2 01/21/2025 04:04 AM    MCHC 30.2 01/21/2025 04:04 AM    RDW 17.3 01/21/2025 04:04 AM    NRBC 3 01/08/2025 03:48 AM    METASPCT 2 01/19/2025 03:55 AM    LYMPHOPCT 21 01/21/2025 04:04 AM    MONOPCT 10 01/21/2025 04:04 AM    MYELOPCT 1 01/10/2025 03:35 AM    EOSPCT 8 01/21/2025 04:04 AM    BASOPCT 1 01/21/2025 04:04 AM    MONOSABS 0.73 01/21/2025 04:04 AM    LYMPHSABS 1.56 01/21/2025 04:04 AM    EOSABS 0.59 01/21/2025 04:04 AM    BASOSABS 0.05 01/21/2025 04:04 AM       Wound Documentation:   Wound 10/24/24 Buttocks Right #1  right buttock (Active)   Wound Image   01/07/25 1528   Dressing Status Dry 01/19/25 0820   Wound Cleansed Cleansed with saline 01/07/25 1600   Dressing/Treatment Open to air 01/19/25 0820   Offloading for Diabetic Foot Ulcers Offloading ordered 01/10/25 2000   Wound Length (cm) 1.2 cm 01/07/25 1529

## 2025-01-21 NOTE — CARE COORDINATION
Patient from [facility], family will not allow her to return.  Made multiple referrals for [facility] at [facility], the following facilities are unable to accept: [facility] (all facilities), [facility], [facility] all due to insurance.  Have spoken with Mariola at [facility], they can accept patient if she would choose to go there.  Teresa with [facility] is following for [facility] as well as [facility] but they can not formally accept until they know what ABX she will be on at [facility] as well as what her wound care orders etc will be.  Wound Care nurse to see patient today, ID following and will need final ABX.  Patient updated on above and will discuss with her daughter.  NEED PRECERT for DC and ELISSA signed.

## 2025-01-21 NOTE — FLOWSHEET NOTE
Inpatient Wound Care    Admit Date: 1/18/2025  7:49 PM    Reason for consult:  abdominal wound and ostomy    Significant history:  Per H&P:     Velma Mazariegos is a 59 y.o. female who presents to the ED for abdominal pain, nausea, and vomiting. She is status post 1/2/25 laparoscopic LAR and drainage of pelvic abscess for complicated diverticulitis complicated by anastomosis leak followed by exploratory laparotomy, drainage of pelvic abscess, flexible sigmoidoscopy, and retention sutures on POD4 1/6/25. She was discharged on POD #12/8 to a facility after tolerating a diet and pain controlled.      She represented to the ED last night with worsening abdominal pain, nausea, and vomiting. She had imaging which was significant for post surgical changes although mild peritoneal thickening/enhancement cannot rule out peritonitis. The patient was admitted and continued on antibiotics. She does not have much appetite.   Findings:     01/21/25 1236   Wound 01/07/25 Abdomen Mid   Date First Assessed/Time First Assessed: 01/07/25 1529   Primary Wound Type: Surgical Type  Location: Abdomen  Wound Location Orientation: Mid   Wound Etiology Surgical   Dressing Status New dressing applied   Wound Cleansed Cleansed with saline   Dressing/Treatment Moist to moist;ABD   Wound Length (cm) 23 cm   Wound Width (cm) 3.2 cm   Wound Depth (cm) 3 cm   Wound Surface Area (cm^2) 73.6 cm^2   Change in Wound Size % (l*w) -63.56   Wound Volume (cm^3) 220.8 cm^3   Wound Healing % -23   Wound Assessment [place]/red;Slough   Drainage Amount Small (< 25%)   Drainage Description Serosanguinous   Odor None   Laxmi-wound Assessment Intact  (retention sutures in place)         Impression:  Surgical abdominal wound    Plan:   Moist to moist packing with saline  TAPs  Patient states she is going home at discharge. She would benefit from home care and follow up at wound care clinic.       Khadra Morales RN 1/21/2025 1:47 PM

## 2025-01-22 LAB
ALBUMIN SERPL-MCNC: 2.3 G/DL (ref 3.5–5.2)
ALP SERPL-CCNC: 100 U/L (ref 35–104)
ALT SERPL-CCNC: 8 U/L (ref 0–32)
ANION GAP SERPL CALCULATED.3IONS-SCNC: 10 MMOL/L (ref 7–16)
AST SERPL-CCNC: 12 U/L (ref 0–31)
BASOPHILS # BLD: 0.05 K/UL (ref 0–0.2)
BASOPHILS NFR BLD: 1 % (ref 0–2)
BILIRUB SERPL-MCNC: 0.2 MG/DL (ref 0–1.2)
BUN SERPL-MCNC: 11 MG/DL (ref 6–23)
CALCIUM SERPL-MCNC: 7.9 MG/DL (ref 8.6–10.2)
CHLORIDE SERPL-SCNC: 103 MMOL/L (ref 98–107)
CO2 SERPL-SCNC: 24 MMOL/L (ref 22–29)
CREAT SERPL-MCNC: 0.6 MG/DL (ref 0.5–1)
EOSINOPHIL # BLD: 0.52 K/UL (ref 0.05–0.5)
EOSINOPHILS RELATIVE PERCENT: 7 % (ref 0–6)
ERYTHROCYTE [DISTWIDTH] IN BLOOD BY AUTOMATED COUNT: 17.2 % (ref 11.5–15)
GFR, ESTIMATED: >90 ML/MIN/1.73M2
GLUCOSE SERPL-MCNC: 111 MG/DL (ref 74–99)
HCT VFR BLD AUTO: 27.7 % (ref 34–48)
HGB BLD-MCNC: 8.6 G/DL (ref 11.5–15.5)
IMM GRANULOCYTES # BLD AUTO: 0.07 K/UL (ref 0–0.58)
IMM GRANULOCYTES NFR BLD: 1 % (ref 0–5)
LYMPHOCYTES NFR BLD: 1.59 K/UL (ref 1.5–4)
LYMPHOCYTES RELATIVE PERCENT: 21 % (ref 20–42)
MAGNESIUM SERPL-MCNC: 2 MG/DL (ref 1.6–2.6)
MCH RBC QN AUTO: 27.7 PG (ref 26–35)
MCHC RBC AUTO-ENTMCNC: 31 G/DL (ref 32–34.5)
MCV RBC AUTO: 89.1 FL (ref 80–99.9)
MICROORGANISM SPEC CULT: ABNORMAL
MICROORGANISM SPEC CULT: ABNORMAL
MONOCYTES NFR BLD: 0.75 K/UL (ref 0.1–0.95)
MONOCYTES NFR BLD: 10 % (ref 2–12)
NEUTROPHILS NFR BLD: 61 % (ref 43–80)
NEUTS SEG NFR BLD: 4.61 K/UL (ref 1.8–7.3)
PLATELET # BLD AUTO: 658 K/UL (ref 130–450)
PMV BLD AUTO: 8.4 FL (ref 7–12)
POTASSIUM SERPL-SCNC: 3.4 MMOL/L (ref 3.5–5)
PROT SERPL-MCNC: 6 G/DL (ref 6.4–8.3)
RBC # BLD AUTO: 3.11 M/UL (ref 3.5–5.5)
SERVICE CMNT-IMP: ABNORMAL
SODIUM SERPL-SCNC: 137 MMOL/L (ref 132–146)
SPECIMEN DESCRIPTION: ABNORMAL
WBC OTHER # BLD: 7.6 K/UL (ref 4.5–11.5)

## 2025-01-22 PROCEDURE — 85025 COMPLETE CBC W/AUTO DIFF WBC: CPT

## 2025-01-22 PROCEDURE — 6360000002 HC RX W HCPCS: Performed by: STUDENT IN AN ORGANIZED HEALTH CARE EDUCATION/TRAINING PROGRAM

## 2025-01-22 PROCEDURE — 2580000003 HC RX 258: Performed by: STUDENT IN AN ORGANIZED HEALTH CARE EDUCATION/TRAINING PROGRAM

## 2025-01-22 PROCEDURE — 6360000002 HC RX W HCPCS: Performed by: SPECIALIST

## 2025-01-22 PROCEDURE — 2580000003 HC RX 258: Performed by: SPECIALIST

## 2025-01-22 PROCEDURE — 1200000000 HC SEMI PRIVATE

## 2025-01-22 PROCEDURE — 2500000003 HC RX 250 WO HCPCS: Performed by: STUDENT IN AN ORGANIZED HEALTH CARE EDUCATION/TRAINING PROGRAM

## 2025-01-22 PROCEDURE — 80053 COMPREHEN METABOLIC PANEL: CPT

## 2025-01-22 PROCEDURE — 83735 ASSAY OF MAGNESIUM: CPT

## 2025-01-22 PROCEDURE — 6370000000 HC RX 637 (ALT 250 FOR IP): Performed by: STUDENT IN AN ORGANIZED HEALTH CARE EDUCATION/TRAINING PROGRAM

## 2025-01-22 PROCEDURE — 94640 AIRWAY INHALATION TREATMENT: CPT

## 2025-01-22 PROCEDURE — 36415 COLL VENOUS BLD VENIPUNCTURE: CPT

## 2025-01-22 RX ADMIN — ENOXAPARIN SODIUM 40 MG: 100 INJECTION SUBCUTANEOUS at 08:56

## 2025-01-22 RX ADMIN — CEFEPIME 2000 MG: 2 INJECTION, POWDER, FOR SOLUTION INTRAVENOUS at 00:03

## 2025-01-22 RX ADMIN — DOCUSATE SODIUM 100 MG: 100 CAPSULE, LIQUID FILLED ORAL at 20:32

## 2025-01-22 RX ADMIN — DAPTOMYCIN 500 MG: 500 INJECTION, POWDER, LYOPHILIZED, FOR SOLUTION INTRAVENOUS at 10:04

## 2025-01-22 RX ADMIN — METHOCARBAMOL TABLETS 750 MG: 500 TABLET, COATED ORAL at 16:57

## 2025-01-22 RX ADMIN — DOCUSATE SODIUM 100 MG: 100 CAPSULE, LIQUID FILLED ORAL at 08:38

## 2025-01-22 RX ADMIN — SODIUM CHLORIDE, PRESERVATIVE FREE 10 ML: 5 INJECTION INTRAVENOUS at 20:34

## 2025-01-22 RX ADMIN — FLUCONAZOLE 400 MG: 100 TABLET ORAL at 08:37

## 2025-01-22 RX ADMIN — METOCLOPRAMIDE HYDROCHLORIDE 10 MG: 5 INJECTION, SOLUTION INTRAMUSCULAR; INTRAVENOUS at 20:33

## 2025-01-22 RX ADMIN — SODIUM CHLORIDE, PRESERVATIVE FREE 10 ML: 5 INJECTION INTRAVENOUS at 08:42

## 2025-01-22 RX ADMIN — CEFEPIME 2000 MG: 2 INJECTION, POWDER, FOR SOLUTION INTRAVENOUS at 12:51

## 2025-01-22 RX ADMIN — KETOROLAC TROMETHAMINE 30 MG: 30 INJECTION, SOLUTION INTRAMUSCULAR at 10:08

## 2025-01-22 RX ADMIN — DULOXETINE HYDROCHLORIDE 60 MG: 60 CAPSULE, DELAYED RELEASE ORAL at 20:32

## 2025-01-22 RX ADMIN — METHOCARBAMOL TABLETS 750 MG: 500 TABLET, COATED ORAL at 08:38

## 2025-01-22 RX ADMIN — METOCLOPRAMIDE HYDROCHLORIDE 10 MG: 5 INJECTION, SOLUTION INTRAMUSCULAR; INTRAVENOUS at 08:39

## 2025-01-22 RX ADMIN — POTASSIUM BICARBONATE 40 MEQ: 782 TABLET, EFFERVESCENT ORAL at 08:37

## 2025-01-22 RX ADMIN — MONTELUKAST 10 MG: 10 TABLET, FILM COATED ORAL at 20:32

## 2025-01-22 RX ADMIN — METHOCARBAMOL TABLETS 750 MG: 500 TABLET, COATED ORAL at 20:32

## 2025-01-22 RX ADMIN — LEVOTHYROXINE SODIUM 88 MCG: 0.09 TABLET ORAL at 05:25

## 2025-01-22 RX ADMIN — ONDANSETRON 4 MG: 2 INJECTION, SOLUTION INTRAMUSCULAR; INTRAVENOUS at 20:33

## 2025-01-22 RX ADMIN — METHOCARBAMOL TABLETS 750 MG: 500 TABLET, COATED ORAL at 12:44

## 2025-01-22 RX ADMIN — SODIUM CHLORIDE 40 MG: 9 INJECTION INTRAMUSCULAR; INTRAVENOUS; SUBCUTANEOUS at 08:39

## 2025-01-22 RX ADMIN — KETOROLAC TROMETHAMINE 30 MG: 30 INJECTION, SOLUTION INTRAMUSCULAR at 16:57

## 2025-01-22 RX ADMIN — KETOROLAC TROMETHAMINE 30 MG: 30 INJECTION, SOLUTION INTRAMUSCULAR at 20:33

## 2025-01-22 RX ADMIN — KETOROLAC TROMETHAMINE 30 MG: 30 INJECTION, SOLUTION INTRAMUSCULAR at 04:06

## 2025-01-22 RX ADMIN — METOCLOPRAMIDE HYDROCHLORIDE 10 MG: 5 INJECTION, SOLUTION INTRAMUSCULAR; INTRAVENOUS at 04:06

## 2025-01-22 RX ADMIN — METOCLOPRAMIDE HYDROCHLORIDE 10 MG: 5 INJECTION, SOLUTION INTRAMUSCULAR; INTRAVENOUS at 17:02

## 2025-01-22 RX ADMIN — ALBUTEROL SULFATE 2.5 MG: 2.5 SOLUTION RESPIRATORY (INHALATION) at 18:44

## 2025-01-22 ASSESSMENT — PAIN DESCRIPTION - DESCRIPTORS
DESCRIPTORS: TENDER;CRAMPING
DESCRIPTORS: ACHING

## 2025-01-22 ASSESSMENT — PAIN DESCRIPTION - ORIENTATION
ORIENTATION: LEFT;LOWER
ORIENTATION: LEFT;LOWER
ORIENTATION: LEFT;RIGHT;MID;LOWER
ORIENTATION: LEFT;LOWER
ORIENTATION: LEFT;LOWER

## 2025-01-22 ASSESSMENT — PAIN DESCRIPTION - LOCATION
LOCATION: ABDOMEN

## 2025-01-22 ASSESSMENT — PAIN SCALES - GENERAL
PAINLEVEL_OUTOF10: 2
PAINLEVEL_OUTOF10: 9
PAINLEVEL_OUTOF10: 4
PAINLEVEL_OUTOF10: 9
PAINLEVEL_OUTOF10: 7
PAINLEVEL_OUTOF10: 9

## 2025-01-22 ASSESSMENT — PAIN - FUNCTIONAL ASSESSMENT: PAIN_FUNCTIONAL_ASSESSMENT: PREVENTS OR INTERFERES SOME ACTIVE ACTIVITIES AND ADLS

## 2025-01-22 NOTE — PLAN OF CARE
Problem: Chronic Conditions and Co-morbidities  Goal: Patient's chronic conditions and co-morbidity symptoms are monitored and maintained or improved  1/21/2025 2201 by Unique Reagan RN  Outcome: Progressing  1/21/2025 1518 by Nuvia Garcia RN  Outcome: Progressing     Problem: Discharge Planning  Goal: Discharge to home or other facility with appropriate resources  1/21/2025 2201 by Unique Reagan RN  Outcome: Progressing  1/21/2025 1518 by Nuvia Garcia RN  Outcome: Progressing     Problem: Pain  Goal: Verbalizes/displays adequate comfort level or baseline comfort level  1/21/2025 2201 by Unique Reagan RN  Outcome: Progressing  1/21/2025 1518 by Nuvia Garcia RN  Outcome: Progressing     Problem: Safety - Adult  Goal: Free from fall injury  1/21/2025 2201 by Unique Reagan RN  Outcome: Progressing  1/21/2025 1518 by Nuvia Garcia RN  Outcome: Progressing     Problem: ABCDS Injury Assessment  Goal: Absence of physical injury  1/21/2025 2201 by Unique Reagan RN  Outcome: Progressing  1/21/2025 1518 by Nuvia Garcia RN  Outcome: Progressing     Problem: Skin/Tissue Integrity  Goal: Absence of new skin breakdown  Description: 1.  Monitor for areas of redness and/or skin breakdown  2.  Assess vascular access sites hourly  3.  Every 4-6 hours minimum:  Change oxygen saturation probe site  4.  Every 4-6 hours:  If on nasal continuous positive airway pressure, respiratory therapy assess nares and determine need for appliance change or resting period.  1/21/2025 2201 by Unique Reagan RN  Outcome: Progressing  1/21/2025 1518 by Nuvia Garcia RN  Outcome: Progressing     Problem: Nutrition Deficit:  Goal: Optimize nutritional status  1/21/2025 2201 by Unique Reagan RN  Outcome: Progressing  1/21/2025 1518 by Nuvia Garcia RN  Outcome: Progressing

## 2025-01-22 NOTE — PROGRESS NOTES
NAME: Velma Mazariegos  MR:  [id number]  :   1965  Admit Date:  2025    Elements of this note, were copied and pasted from Previous. Updates have been made where noted and reflect current exam and medical decision making from the DOS of this encounter.  CHIEF COMPLAINT       Chief Complaint   Patient presents with    Abdominal Pain     Pt comes to the ED from [facility] with c/o post op abdominal pain with nausea and vomiting. Pt had recent bowel resection surgery on 25 and had colostomy placed.      HISTORY OF PRESENT ILLNESS     Velma Mazariegos is a 59 y.o. female admitted on 2025 and has  has a past medical history of Arthritis, Asthma, Diabetes mellitus (HCC), Sleep apnea, and Thyroid disease.     This is a face to face encounter   25- patient in bed- frustrated - wants to go home. On room air. Still with abdominal pain.     2025- in bed has no c/o f/c on o2 denies uti sx    Patient is tolerating medications. No reported adverse drug reactions.  Available labs, imaging studies, microbiologic studies have been reviewed with pt and family if present.    Assessment & Plan     Admitted for   Peritonitis (HCC) [K65.9]  Fever, unspecified fever cause [R50.9]  Leak of anastomosis between gastrointestinal structures [K91.89]    ID following for   Fevers - improved   Pelvic abscess VRE Candida  Diverticular disease  H/o E coli/Strep 2024  Can not r/o peritonitis  add GN coverage  Wound cx pending   E coli bacteruria - rule out UTI   PSORIASIS    Plan:  Discussed with Dr. Hull   Continue daptomycin   Continue cefepime  Continue diflucan   Check procal -0.11  Us rle  for dvt NEG  Check cpk -2025-16  Follow-up cultures   Discharge planning- maybe able to discharge with Po antibiotics. Will await final cultures   Updated patient on plans     CHRIS Reis - CNS  2025  Please note that 30 minutes were spent on this case in regards to the intensity and

## 2025-01-22 NOTE — PLAN OF CARE
Problem: Chronic Conditions and Co-morbidities  Goal: Patient's chronic conditions and co-morbidity symptoms are monitored and maintained or improved  1/21/2025 2201 by Unique Reagan RN  Outcome: Progressing  1/21/2025 1518 by Nuvia Garcia RN  Outcome: Progressing     Problem: Discharge Planning  Goal: Discharge to home or other facility with appropriate resources  1/21/2025 2201 by Unique Reagan RN  Outcome: Progressing  1/21/2025 1518 by Nuvia Gracia RN  Outcome: Progressing     Problem: Pain  Goal: Verbalizes/displays adequate comfort level or baseline comfort level  1/21/2025 2201 by Unique Reagan RN  Outcome: Progressing  1/21/2025 1518 by Nvuia Garcia RN  Outcome: Progressing     Problem: Safety - Adult  Goal: Free from fall injury  1/21/2025 2201 by Unique Reagan RN  Outcome: Progressing  1/21/2025 1518 by Nuvia Garcia RN  Outcome: Progressing     Problem: ABCDS Injury Assessment  Goal: Absence of physical injury  1/21/2025 2201 by Unique Reagan RN  Outcome: Progressing  1/21/2025 1518 by Nuvia Garcia RN  Outcome: Progressing     Problem: Skin/Tissue Integrity  Goal: Absence of new skin breakdown  Description: 1.  Monitor for areas of redness and/or skin breakdown  2.  Assess vascular access sites hourly  3.  Every 4-6 hours minimum:  Change oxygen saturation probe site  4.  Every 4-6 hours:  If on nasal continuous positive airway pressure, respiratory therapy assess nares and determine need for appliance change or resting period.  1/21/2025 2201 by Unique Reagan RN  Outcome: Progressing  1/21/2025 1518 by Nuvia Garcia RN  Outcome: Progressing     Problem: Nutrition Deficit:  Goal: Optimize nutritional status  1/21/2025 2201 by Unique Reagan RN  Outcome: Progressing  1/21/2025 1518 by Nuvia Garcia RN  Outcome: Progressing

## 2025-01-22 NOTE — PROGRESS NOTES
GENERAL SURGERY  DAILY PROGRESS NOTE    Patient's Name/Date of Birth: Velma Mazariegos / 1965    Date: 2025     Chief Complaint   Patient presents with    Abdominal Pain     Pt comes to the ED from [facility] with c/o post op abdominal pain with nausea and vomiting. Pt had recent bowel resection surgery on 25 and had colostomy placed.         Subjective:  Continues to do well.  No nausea no vomiting.  Tolerating diet.  Pain well-controlled.  And packed with physical therapy 1724    Objective:  Last 24Hrs  Temp  Av.2 °F (36.8 °C)  Min: 97.9 °F (36.6 °C)  Max: 98.4 °F (36.9 °C)  Resp  Av  Min: 18  Max: 20  Pulse  Av  Min: 82  Max: 98  Systolic (24hrs), Av , Min:126 , Max:140     Diastolic (24hrs), Av, Min:69, Max:74    SpO2  Av %  Min: 95 %  Max: 95 %    I/O last 3 completed shifts:  In: 120 [P.O.:120]  Out: 280 [Stool:280]      General: In no acute distress, alert and oriented x4  Cardiovascular: Warm throughout, no edema  Respiratory: no respiratory distress, equal chest rise  Abdomen: soft, obese, abdomen wound open with retention sutures and packed with gauze, covered with dressings and tape, no strikethrough. LLQ ostomy w/ soft brown stool, non-distended  Skin: no obvious rashes or lesions appreciated, no jaundice  Extremities: atraumatic, no focal motor deficits, no open wounds      CBC  Recent Labs     25  0417 25  0404 25  0442   WBC 7.4 7.4 7.6   RBC 3.52 3.27* 3.11*   HGB 9.8* 8.9* 8.6*   HCT 31.5* 29.5* 27.7*   MCV 89.5 90.2 89.1   MCH 27.8 27.2 27.7   MCHC 31.1* 30.2* 31.0*   RDW 17.4* 17.3* 17.2*   * 711* 658*   MPV 8.3 8.3 8.4       CMP  Recent Labs     25  0417 25  0404 25  0442     DUPLICATE ORDER 136 137   K 3.5  DUPLICATE ORDER 3.6 3.4*     DUPLICATE ORDER 101 103   CO2 25  DUPLICATE ORDER 27 24   BUN 10  DUPLICATE ORDER 11 11   CREATININE 0.8  DUPLICATE ORDER 0.7 0.6   GLUCOSE 105*

## 2025-01-22 NOTE — DISCHARGE INSTRUCTIONS
Your information:  Name: Velma Mazariegos  : 1965    Your instructions:  Discharge home with home care  They will call you to set up your first visit    Coloplast 1 piece flat pouch #[id number]  Brava ring # [id number]  Skin prep #[id number]  Brava rings #[id number]  Brava Paste # [id number]    Change pouch twice a week and as needed for leakage.     Signs and symptoms to watch out for:  Call your doctor now or seek immediate medical care if:    You are vomiting.     You have new or worse belly pain.     You have a fever.     You cannot pass stools or gas.   Watch closely for changes in your health, and be sure to contact your doctor if:    Your stoma turns pale or changes color.     Your stoma swells or bleeds.     You have little or no waste going into your pouch.       What to do after you leave the hospital:    Recommended diet: regular diet    The following personal items were collected during your admission and were returned to you:    Belongings  Dental Appliances: None  Vision - Corrective Lenses: Eyeglasses, At bedside  Hearing Aid: None  Clothing: Pants, Shirt, Jacket/Coat, Socks  Jewelry: None  Electronic Devices: Cell Phone  Weapons (Notify Protective Services/Security): None  Home Medications: None  Valuables Given To: Patient  Provide Name(s) of Who Valuable(s) Were Given To: n/a    Information obtained by:  By signing below, I understand that if any problems occur once I leave the hospital I am to contact Dr. Carranza.  I understand and acknowledge receipt of the instructions indicated above.

## 2025-01-22 NOTE — CARE COORDINATION
NURIA met with patient in her room.  She states she has decided she wants to go home at DC with [facility], has a history of [facility], Due to insurance and will use again.  Awaiting final ABX plan but per conversation this am may be able to transition to oral on DC.  NURIA made [facility] referral to [facility] via [facility] portal, they have accepted for SOC 1/24/25.  [facility] orders are in Epic.  If needing IV ABX at DC will need to make referral to pharmacy for infusion.  NURIA did speak with Charisse, daughter at her request to update on plan.

## 2025-01-22 NOTE — PROGRESS NOTES
Internal Medicine Progress Note     [facility]     Paul Avery D.O., AIMEEI.                         Brant Tian D.O., AB Castrejon D.O.     Mili Hays, MSN, APRN, NP-C  Filemon Rosales, MSN, APRN-CNP  Remington Forte, MSN, APRN, NP-C  Christin Mary, MSN, APRN-CNP  Xochitl Bach, MSN, APRN, NP-C     Primary Care Physician: Niall Oconnor MD   Admitting Physician:  No admitting provider for patient encounter.  Admission date and time: 1/18/2025  7:49 PM    Room:  [address] [place] [address]  Admitting diagnosis: Peritonitis (HCC) [K65.9]  Fever, unspecified fever cause [R50.9]  Leak of anastomosis between gastrointestinal structures [K91.89]    Patient Name: Velma Mazariegos  MRN: [id number]    Date of Service: 1/22/2025     Subjective:  Velma is a 59 y.o. female who was seen and examined today,1/22/2025, at the bedside.  Patient sitting up in chair.  Anxious to be discharged.  Attempting to arrange antibiotics for discharge.  Patient tolerating diet and have been ambulatory.  Adequate output from ostomy.  Denies chest pain or shortness of breath    No family member present      Review of System:   Constitutional:   Denies fever or chills, weight loss or gain, fatigue or malaise.  HEENT:   Denies ear pain, sore throat, sinus or eye problems.  Cardiovascular:   Denies any chest pain, irregular heartbeats, or palpitations.   Respiratory:   Denies shortness of breath, coughing, sputum production, hemoptysis, or wheezing.  Gastrointestinal:   Postoperative abdominal pain as to be expected.  Tolerating a diet.  Having adequate ostomy output.  Genitourinary:    Denies any urgency, frequency, hematuria. Voiding  without difficulty.  Extremities:   Denies lower extremity swelling, edema or cyanosis.   Neurology:    Denies any headache or focal neurological deficits, improving weakness and deconditioning.  Psch:   Denies being anxious or depressed.  Musculoskeletal:    Diffuse  leak with feculent peritonitis and intra-abdominal abscess formation status post diagnostic laparoscopy converted to exploratory laparotomy, partial colon resection, drainage of pelvic abscess, and end colostomy creation 1/6/2025  Non-insulin-dependent diabetes mellitus type II with hemoglobin A1c 8.6  Hypothyroid maintained on Synthroid  Reactive airway disease, controlled  Arthritis  Sleep apnea uses home CPAP  Obesity with elevated BMI of 38.10 kg/m²  Vitamin D deficiency    Plan:       Continue current diet  Encourage ambulation  Social service to arrange discharge planning  Lab satisfactory    More than 50% of my  time was spent at the bedside counseling/coordinating care with the patient and/or family with face to face contact.  This time was spent reviewing notes and laboratory data as well as instructing and counseling the patient. Time I spent with the family or surrogate(s) is included only if the patient was incapable of providing the necessary information or participating in medical decisions. I also discussed the differential diagnosis and all of the proposed management plans with the patient and individuals accompanying the patient.        Paul Avery DO, FMarkellA.C.O.I.  1/22/2025  5:53 PM

## 2025-01-23 LAB
ALBUMIN SERPL-MCNC: 2.1 G/DL (ref 3.5–5.2)
ALP SERPL-CCNC: 109 U/L (ref 35–104)
ALT SERPL-CCNC: 8 U/L (ref 0–32)
ANION GAP SERPL CALCULATED.3IONS-SCNC: 11 MMOL/L (ref 7–16)
AST SERPL-CCNC: 12 U/L (ref 0–31)
BASOPHILS # BLD: 0 K/UL (ref 0–0.2)
BASOPHILS NFR BLD: 0 % (ref 0–2)
BILIRUB SERPL-MCNC: 0.2 MG/DL (ref 0–1.2)
BUN SERPL-MCNC: 9 MG/DL (ref 6–23)
CALCIUM SERPL-MCNC: 8 MG/DL (ref 8.6–10.2)
CHLORIDE SERPL-SCNC: 103 MMOL/L (ref 98–107)
CO2 SERPL-SCNC: 23 MMOL/L (ref 22–29)
CREAT SERPL-MCNC: 0.6 MG/DL (ref 0.5–1)
EOSINOPHIL # BLD: 0.21 K/UL (ref 0.05–0.5)
EOSINOPHILS RELATIVE PERCENT: 3 % (ref 0–6)
ERYTHROCYTE [DISTWIDTH] IN BLOOD BY AUTOMATED COUNT: 16.8 % (ref 11.5–15)
GFR, ESTIMATED: >90 ML/MIN/1.73M2
GLUCOSE SERPL-MCNC: 133 MG/DL (ref 74–99)
HCT VFR BLD AUTO: 27.4 % (ref 34–48)
HGB BLD-MCNC: 8.4 G/DL (ref 11.5–15.5)
LYMPHOCYTES NFR BLD: 1.18 K/UL (ref 1.5–4)
LYMPHOCYTES RELATIVE PERCENT: 15 % (ref 20–42)
MCH RBC QN AUTO: 27.6 PG (ref 26–35)
MCHC RBC AUTO-ENTMCNC: 30.7 G/DL (ref 32–34.5)
MCV RBC AUTO: 90.1 FL (ref 80–99.9)
MONOCYTES NFR BLD: 0.56 K/UL (ref 0.1–0.95)
MONOCYTES NFR BLD: 7 % (ref 2–12)
NEUTROPHILS NFR BLD: 76 % (ref 43–80)
NEUTS SEG NFR BLD: 6.05 K/UL (ref 1.8–7.3)
PLATELET # BLD AUTO: 673 K/UL (ref 130–450)
PMV BLD AUTO: 8.4 FL (ref 7–12)
POTASSIUM SERPL-SCNC: 3.6 MMOL/L (ref 3.5–5)
PROT SERPL-MCNC: 6.2 G/DL (ref 6.4–8.3)
RBC # BLD AUTO: 3.04 M/UL (ref 3.5–5.5)
RBC # BLD: ABNORMAL 10*6/UL
SEND OUT REPORT: NORMAL
SODIUM SERPL-SCNC: 137 MMOL/L (ref 132–146)
TEST NAME: NORMAL
WBC OTHER # BLD: 8 K/UL (ref 4.5–11.5)

## 2025-01-23 PROCEDURE — 2580000003 HC RX 258: Performed by: STUDENT IN AN ORGANIZED HEALTH CARE EDUCATION/TRAINING PROGRAM

## 2025-01-23 PROCEDURE — 85025 COMPLETE CBC W/AUTO DIFF WBC: CPT

## 2025-01-23 PROCEDURE — 6360000002 HC RX W HCPCS: Performed by: STUDENT IN AN ORGANIZED HEALTH CARE EDUCATION/TRAINING PROGRAM

## 2025-01-23 PROCEDURE — 6370000000 HC RX 637 (ALT 250 FOR IP): Performed by: STUDENT IN AN ORGANIZED HEALTH CARE EDUCATION/TRAINING PROGRAM

## 2025-01-23 PROCEDURE — 80053 COMPREHEN METABOLIC PANEL: CPT

## 2025-01-23 PROCEDURE — 6360000002 HC RX W HCPCS: Performed by: SPECIALIST

## 2025-01-23 PROCEDURE — 36415 COLL VENOUS BLD VENIPUNCTURE: CPT

## 2025-01-23 PROCEDURE — 6370000000 HC RX 637 (ALT 250 FOR IP): Performed by: SPECIALIST

## 2025-01-23 PROCEDURE — 2500000003 HC RX 250 WO HCPCS: Performed by: STUDENT IN AN ORGANIZED HEALTH CARE EDUCATION/TRAINING PROGRAM

## 2025-01-23 PROCEDURE — 1200000000 HC SEMI PRIVATE

## 2025-01-23 PROCEDURE — 2580000003 HC RX 258: Performed by: SPECIALIST

## 2025-01-23 RX ORDER — LINEZOLID 600 MG/1
600 TABLET, FILM COATED ORAL EVERY 12 HOURS SCHEDULED
Status: DISCONTINUED | OUTPATIENT
Start: 2025-01-23 | End: 2025-01-24

## 2025-01-23 RX ORDER — LINEZOLID 600 MG/1
600 TABLET, FILM COATED ORAL EVERY 12 HOURS SCHEDULED
Qty: 28 TABLET | Refills: 0 | Status: SHIPPED | OUTPATIENT
Start: 2025-01-23 | End: 2025-01-24 | Stop reason: HOSPADM

## 2025-01-23 RX ORDER — SULFAMETHOXAZOLE AND TRIMETHOPRIM 800; 160 MG/1; MG/1
1 TABLET ORAL EVERY 12 HOURS SCHEDULED
Status: DISCONTINUED | OUTPATIENT
Start: 2025-01-23 | End: 2025-01-24 | Stop reason: HOSPADM

## 2025-01-23 RX ORDER — NALOXONE HYDROCHLORIDE 0.4 MG/ML
INJECTION, SOLUTION INTRAMUSCULAR; INTRAVENOUS; SUBCUTANEOUS
Status: DISCONTINUED
Start: 2025-01-23 | End: 2025-01-23

## 2025-01-23 RX ORDER — SULFAMETHOXAZOLE AND TRIMETHOPRIM 800; 160 MG/1; MG/1
1 TABLET ORAL EVERY 12 HOURS SCHEDULED
Qty: 28 TABLET | Refills: 0 | Status: SHIPPED | OUTPATIENT
Start: 2025-01-23 | End: 2025-02-06

## 2025-01-23 RX ADMIN — OXYCODONE 10 MG: 5 TABLET ORAL at 03:05

## 2025-01-23 RX ADMIN — ENOXAPARIN SODIUM 40 MG: 100 INJECTION SUBCUTANEOUS at 09:07

## 2025-01-23 RX ADMIN — KETOROLAC TROMETHAMINE 30 MG: 30 INJECTION, SOLUTION INTRAMUSCULAR at 22:05

## 2025-01-23 RX ADMIN — SODIUM CHLORIDE, PRESERVATIVE FREE 10 ML: 5 INJECTION INTRAVENOUS at 09:08

## 2025-01-23 RX ADMIN — METOCLOPRAMIDE HYDROCHLORIDE 10 MG: 5 INJECTION, SOLUTION INTRAMUSCULAR; INTRAVENOUS at 22:05

## 2025-01-23 RX ADMIN — METHOCARBAMOL TABLETS 750 MG: 500 TABLET, COATED ORAL at 09:01

## 2025-01-23 RX ADMIN — METOCLOPRAMIDE HYDROCHLORIDE 10 MG: 5 INJECTION, SOLUTION INTRAMUSCULAR; INTRAVENOUS at 15:35

## 2025-01-23 RX ADMIN — CEFEPIME 2000 MG: 2 INJECTION, POWDER, FOR SOLUTION INTRAVENOUS at 02:22

## 2025-01-23 RX ADMIN — METHOCARBAMOL TABLETS 750 MG: 500 TABLET, COATED ORAL at 17:54

## 2025-01-23 RX ADMIN — METOCLOPRAMIDE HYDROCHLORIDE 10 MG: 5 INJECTION, SOLUTION INTRAMUSCULAR; INTRAVENOUS at 09:04

## 2025-01-23 RX ADMIN — DOCUSATE SODIUM 100 MG: 100 CAPSULE, LIQUID FILLED ORAL at 09:01

## 2025-01-23 RX ADMIN — SODIUM CHLORIDE 40 MG: 9 INJECTION INTRAMUSCULAR; INTRAVENOUS; SUBCUTANEOUS at 09:05

## 2025-01-23 RX ADMIN — MONTELUKAST 10 MG: 10 TABLET, FILM COATED ORAL at 22:05

## 2025-01-23 RX ADMIN — KETOROLAC TROMETHAMINE 30 MG: 30 INJECTION, SOLUTION INTRAMUSCULAR at 12:23

## 2025-01-23 RX ADMIN — DAPTOMYCIN 500 MG: 500 INJECTION, POWDER, LYOPHILIZED, FOR SOLUTION INTRAVENOUS at 09:08

## 2025-01-23 RX ADMIN — METHOCARBAMOL TABLETS 750 MG: 500 TABLET, COATED ORAL at 12:23

## 2025-01-23 RX ADMIN — KETOROLAC TROMETHAMINE 30 MG: 30 INJECTION, SOLUTION INTRAMUSCULAR at 05:38

## 2025-01-23 RX ADMIN — DULOXETINE HYDROCHLORIDE 60 MG: 60 CAPSULE, DELAYED RELEASE ORAL at 22:05

## 2025-01-23 RX ADMIN — LINEZOLID 600 MG: 600 TABLET, FILM COATED ORAL at 22:05

## 2025-01-23 RX ADMIN — FLUCONAZOLE 400 MG: 100 TABLET ORAL at 09:02

## 2025-01-23 RX ADMIN — LORAZEPAM 1 MG: 1 TABLET ORAL at 22:16

## 2025-01-23 RX ADMIN — SULFAMETHOXAZOLE AND TRIMETHOPRIM 1 TABLET: 800; 160 TABLET ORAL at 22:04

## 2025-01-23 RX ADMIN — METOCLOPRAMIDE HYDROCHLORIDE 10 MG: 5 INJECTION, SOLUTION INTRAMUSCULAR; INTRAVENOUS at 03:06

## 2025-01-23 RX ADMIN — SODIUM CHLORIDE, PRESERVATIVE FREE 10 ML: 5 INJECTION INTRAVENOUS at 22:12

## 2025-01-23 RX ADMIN — DOCUSATE SODIUM 100 MG: 100 CAPSULE, LIQUID FILLED ORAL at 22:05

## 2025-01-23 RX ADMIN — METHOCARBAMOL TABLETS 750 MG: 500 TABLET, COATED ORAL at 22:05

## 2025-01-23 RX ADMIN — SODIUM CHLORIDE 1000 MG: 9 INJECTION INTRAMUSCULAR; INTRAVENOUS; SUBCUTANEOUS at 15:37

## 2025-01-23 RX ADMIN — LEVOTHYROXINE SODIUM 88 MCG: 0.09 TABLET ORAL at 05:38

## 2025-01-23 RX ADMIN — KETOROLAC TROMETHAMINE 30 MG: 30 INJECTION, SOLUTION INTRAMUSCULAR at 15:40

## 2025-01-23 ASSESSMENT — PAIN SCALES - GENERAL
PAINLEVEL_OUTOF10: 8
PAINLEVEL_OUTOF10: 9
PAINLEVEL_OUTOF10: 7
PAINLEVEL_OUTOF10: 2
PAINLEVEL_OUTOF10: 8
PAINLEVEL_OUTOF10: 9
PAINLEVEL_OUTOF10: 6

## 2025-01-23 ASSESSMENT — PAIN DESCRIPTION - LOCATION
LOCATION: ABDOMEN

## 2025-01-23 ASSESSMENT — PAIN DESCRIPTION - DESCRIPTORS
DESCRIPTORS: ACHING
DESCRIPTORS: ACHING
DESCRIPTORS: TENDER;SORE;CRAMPING
DESCRIPTORS: ACHING
DESCRIPTORS: ACHING
DESCRIPTORS: JABBING;POUNDING;PRESSURE

## 2025-01-23 ASSESSMENT — PAIN DESCRIPTION - ORIENTATION
ORIENTATION: RIGHT;LEFT;MID;LOWER
ORIENTATION: MID
ORIENTATION: MID
ORIENTATION: LEFT;LOWER;MID
ORIENTATION: MID

## 2025-01-23 ASSESSMENT — PAIN - FUNCTIONAL ASSESSMENT: PAIN_FUNCTIONAL_ASSESSMENT: PREVENTS OR INTERFERES SOME ACTIVE ACTIVITIES AND ADLS

## 2025-01-23 NOTE — PROGRESS NOTES
GENERAL SURGERY  DAILY PROGRESS NOTE    Patient's Name/Date of Birth: Velma Mazariegos / 1965    Date: 2025     Chief Complaint   Patient presents with    Abdominal Pain     Pt comes to the ED from [facility] with c/o post op abdominal pain with nausea and vomiting. Pt had recent bowel resection surgery on 25 and had colostomy placed.         Subjective:  Patient feeling well this morning. Denies nausea or vomiting.     Objective:  Last 24Hrs  Temp  Av.9 °F (37.2 °C)  Min: 98.3 °F (36.8 °C)  Max: 99.5 °F (37.5 °C)  Resp  Av.3  Min: 17  Max: 18  Pulse  Av.5  Min: 88  Max: 93  Systolic (24hrs), Av , Min:117 , Max:153     Diastolic (24hrs), Av, Min:65, Max:83    SpO2  Av.5 %  Min: 96 %  Max: 97 %    I/O last 3 completed shifts:  In: 720 [P.O.:720]  Out: 80 [Stool:80]      General: In no acute distress, alert and oriented x4  Cardiovascular: Warm throughout, no edema  Respiratory: no respiratory distress, equal chest rise  Abdomen: soft, obese, abdomen wound open with retention sutures and packed with gauze, covered with dressings and tape, no strikethrough. LLQ ostomy w/ soft brown stool, non-distended  Skin: no obvious rashes or lesions appreciated, no jaundice  Extremities: atraumatic, no focal motor deficits, no open wounds      CBC  Recent Labs     25  0404 25  0442 25  0349   WBC 7.4 7.6 8.0   RBC 3.27* 3.11* 3.04*   HGB 8.9* 8.6* 8.4*   HCT 29.5* 27.7* 27.4*   MCV 90.2 89.1 90.1   MCH 27.2 27.7 27.6   MCHC 30.2* 31.0* 30.7*   RDW 17.3* 17.2* 16.8*   * 658* 673*   MPV 8.3 8.4 8.4       CMP  Recent Labs     25  0404 25  0442 25  0349    137 137   K 3.6 3.4* 3.6    103 103   CO2 27 24 23   BUN 11 11 9   CREATININE 0.7 0.6 0.6   GLUCOSE 115* 111* 133*   CALCIUM 8.1* 7.9* 8.0*   BILITOT 0.2 0.2 0.2   ALKPHOS 97 100 109*   AST 12 12 12   ALT 8 8 8         Assessment/Plan:    Patient Active Problem List   Diagnosis

## 2025-01-23 NOTE — PROGRESS NOTES
Internal Medicine Progress Note     [facility]     Paul Avery D.O., AIMEEI.                         Brant Tian D.O., AB Castrejon D.O.     Mili Hays, MSN, APRN, NP-C  Filemon Rosales, MSN, APRN-CNP  Remington Forte, MSN, APRN, NP-C  Christin Mary, MSN, APRN-CNP  Xochitl Bach, MSN, APRN, NP-C     Primary Care Physician: Niall Oconnor MD   Admitting Physician:  No admitting provider for patient encounter.  Admission date and time: 1/18/2025  7:49 PM    Room:  [address] [place] [address]  Admitting diagnosis: Peritonitis (HCC) [K65.9]  Fever, unspecified fever cause [R50.9]  Leak of anastomosis between gastrointestinal structures [K91.89]    Patient Name: Velma Mazariegos  MRN: [id number]    Date of Service: 1/23/2025     Subjective:  Velma is a 59 y.o. female who was seen and examined today,1/23/2025, at the bedside.  Patient sitting up in chair.  Patient denies specific complaints of chest pain or shortness of breath.  Tolerating diet with adequate bowel function.  Patient very frustrated due to ongoing discharge preparation and planning.    No family member present      Review of System:   Constitutional:   Denies fever or chills, weight loss or gain, fatigue or malaise.  HEENT:   Denies ear pain, sore throat, sinus or eye problems.  Cardiovascular:   Denies any chest pain, irregular heartbeats, or palpitations.   Respiratory:   Denies shortness of breath, coughing, sputum production, hemoptysis, or wheezing.  Gastrointestinal:   Postoperative abdominal pain as to be expected.  Tolerating a diet.  Having adequate ostomy output.  Genitourinary:    Denies any urgency, frequency, hematuria. Voiding  without difficulty.  Extremities:   Denies lower extremity swelling, edema or cyanosis.   Neurology:    Denies any headache or focal neurological deficits, improving weakness and deconditioning.  Psch:   Denies being anxious or depressed.  Musculoskeletal:    Diffuse  muscle aches and pains.  Integumentary:   Denies any rashes, ulcers, or excoriations.  Denies bruising.  Hematologic/Lymphatic:  Denies bruising or bleeding.    Physical Exam:  I/O this shift:  In: 120 [P.O.:120]  Out: -     Intake/Output Summary (Last 24 hours) at 1/23/2025 1633  Last data filed at 1/23/2025 0902  Gross per 24 hour   Intake 360 ml   Output --   Net 360 ml   I/O last 3 completed shifts:  In: 600 [P.O.:600]  Out: 80 [Stool:80]  Patient Vitals for the past 96 hrs (Last 3 readings):   Weight   01/20/25 0930 97.2 kg (214 lb 4.8 oz)     Vital Signs:   Blood pressure 131/82, pulse 90, temperature 98.5 °F (36.9 °C), temperature source Oral, resp. rate 17, height 1.575 m (5' 2.01\"), weight 97.2 kg (214 lb 4.8 oz), SpO2 95%.    General appearance:  Alert, responsive, oriented to person, place, and time. Well preserved, alert, no distress.  Head:  Normocephalic. No masses, lesions or tenderness.  Eyes:  PERRLA.  EOMI.  Sclera clear.  Buccal mucosa moist.  ENT:  Ears normal. Mucosa normal.  Neck:    Supple. Trachea midline. No thyromegaly. No JVD. No bruits.  Heart:    Rhythm regular. Rate controlled.  No murmurs.  Lungs:    Symmetrical. Clear to auscultation bilaterally.  No wheezes. No rhonchi. No rales.  Abdomen:   Soft.  Tender to palpation diffusely as to be expected.  Ostomy is in place with stool output.  Binder is in place with appropriate coverage of retention sutures.  Extremities:    Peripheral pulses present.  No peripheral edema.  No ulcers. No cyanosis. No clubbing.  Neurologic:    Alert x 3.  No focal deficit.  Cranial nerves grossly intact. No focal weakness.  Psych:   Behavior is normal. Mood appears normal. Speech is not rapid and/or pressured.  Musculoskeletal:   Spine ROM normal. Muscular strength intact. Gait not assessed.  Integumentary:  No rashes  Skin normal color and texture.  Genitalia/Breast:  Deferred    Medication:  Scheduled Meds:   linezolid  600 mg Oral 2 times per day

## 2025-01-23 NOTE — PROGRESS NOTES
NAME: Velma Mazariegos  MR:  [id number]  :   1965  Admit Date:  2025    Elements of this note, were copied and pasted from Previous. Updates have been made where noted and reflect current exam and medical decision making from the DOS of this encounter.  CHIEF COMPLAINT       Chief Complaint   Patient presents with    Abdominal Pain     Pt comes to the ED from [facility] with c/o post op abdominal pain with nausea and vomiting. Pt had recent bowel resection surgery on 25 and had colostomy placed.      HISTORY OF PRESENT ILLNESS     Velma Mazariegos is a 59 y.o. female admitted on 2025 and has  has a past medical history of Arthritis, Asthma, Diabetes mellitus (HCC), Sleep apnea, and Thyroid disease.     This is a face to face encounter   25- in chair upset about being here    patient in bed- frustrated - wants to go home. On room air. Still with abdominal pain.   2025- in bed has no c/o f/c on o2 denies uti sx    Patient is tolerating medications. No reported adverse drug reactions.  Available labs, imaging studies, microbiologic studies have been reviewed with pt and family if present.    Assessment & Plan     Admitted for   Peritonitis (HCC) [K65.9]  Fever, unspecified fever cause [R50.9]  Leak of anastomosis between gastrointestinal structures [K91.89]    ID following for   Fevers - improved   Pelvic abscess VRE Candida  Diverticular disease  H/o E coli/Strep 2024  Can not r/o peritonitis  add GN coverage  Wound cx pending   E coli bacteruria - rule out UTI   PSORIASIS    Plan:  Added bactrim   Called micro to work up A baumanni  daptomycin swicth to linezolid  Cefepime switch to ertapenem   Continue diflucan   Check procal -0.11  Us rle  for dvt NEG    Check final cultures   Discharge planning- med rec prelim POC   Updated patient and family on POC    Please note that 30 minutes were spent on this case in regards to the intensity and complexity inherent to hospital  Date    SEDRATE 6 09/24/2024    SEDRATE 36 (H) 09/11/2024     Lab Results   Component Value Date    CRP 3.0 09/24/2024    CRP 9.0 (H) 09/18/2024     Lab Results   Component Value Date    PROCAL 0.11 (H) 01/19/2025     Recent Labs     01/21/25  0404 01/22/25  0442 01/23/25  0349   AST 12 12 12   ALT 8 8 8         Radiology:  Vascular duplex lower extremity venous bilateral    Result Date: 1/19/2025  No evidence of DVT in either lower extremity.     CT ABDOMEN PELVIS W IV CONTRAST Additional Contrast? None    Result Date: 1/18/2025  1. Status post partial colectomy with left lower quadrant colostomy. No evidence of bowel obstruction. 2. Small volume intraperitoneal fluid with mild peritoneal thickening/enhancement. Findings may be postsurgical, however, peritonitis cannot be excluded. 3. Small foci of gas along the anterior abdomen, which appears extraperitoneal, possibly within the anterior abdominal wall. 4. Extensive subcutaneous emphysema, overall decreased since the prior examination. 5. Trace left pleural effusion.     XR CHEST PORTABLE    Result Date: 1/18/2025  1. No sign of acute cardiopulmonary disease. 2. Moderate soft tissue gas in the left breast or lateral lower left chest wall. Recommend correlation with the clinical history.     Imaging and labs were reviewed per medical records.     Thank you for involving me in the care of Velma Mazariegos I will continue to follow. Please do not hesitate to call [phone] for any questions or concerns.    Electronically signed by Keiko Hull MD on 1/23/2025 at 11:27 AM

## 2025-01-23 NOTE — CARE COORDINATION
1/23/2025 115p (sf) SS NOTE: ID med rec'ed for IV Invanz 1g  q24 for 14 days. Referral made to [facility], spoke with Mariola. Mariola to review pt's benefits and will let SW know if pt has an out of pocket expense. Pt will need line placed prior to dc. Signed script and pharmacy agreement faxed to [facility] [phone].    [facility] has accepted pt with a tentative SOC of 1/24/2025. [facility] orders are in chart.     **ADDEND 156p** NURIA spoke with Mariola for [facility]. Per Mariola, pt has 100% IV ATB coverage, therefore she will not have an out of pocket expense. NURIA relayed info to pt.     Family to provide transportation home on dc.   Electronically signed by CÉSAR Young on 1/23/2025 at 1:19 PM

## 2025-01-24 ENCOUNTER — APPOINTMENT (OUTPATIENT)
Dept: CT IMAGING | Age: 60
DRG: 466 | End: 2025-01-24
Payer: COMMERCIAL

## 2025-01-24 VITALS
TEMPERATURE: 98.6 F | SYSTOLIC BLOOD PRESSURE: 139 MMHG | BODY MASS INDEX: 39.43 KG/M2 | RESPIRATION RATE: 18 BRPM | DIASTOLIC BLOOD PRESSURE: 67 MMHG | WEIGHT: 214.3 LBS | OXYGEN SATURATION: 94 % | HEART RATE: 90 BPM | HEIGHT: 62 IN

## 2025-01-24 LAB
ALBUMIN SERPL-MCNC: 2.3 G/DL (ref 3.5–5.2)
ALP SERPL-CCNC: 109 U/L (ref 35–104)
ALT SERPL-CCNC: 9 U/L (ref 0–32)
ANION GAP SERPL CALCULATED.3IONS-SCNC: 10 MMOL/L (ref 7–16)
AST SERPL-CCNC: 13 U/L (ref 0–31)
BASOPHILS # BLD: 0.04 K/UL (ref 0–0.2)
BASOPHILS NFR BLD: 1 % (ref 0–2)
BILIRUB SERPL-MCNC: 0.3 MG/DL (ref 0–1.2)
BUN SERPL-MCNC: 11 MG/DL (ref 6–23)
CALCIUM SERPL-MCNC: 8.3 MG/DL (ref 8.6–10.2)
CHLORIDE SERPL-SCNC: 100 MMOL/L (ref 98–107)
CO2 SERPL-SCNC: 24 MMOL/L (ref 22–29)
CREAT SERPL-MCNC: 0.6 MG/DL (ref 0.5–1)
EOSINOPHIL # BLD: 0.49 K/UL (ref 0.05–0.5)
EOSINOPHILS RELATIVE PERCENT: 6 % (ref 0–6)
ERYTHROCYTE [DISTWIDTH] IN BLOOD BY AUTOMATED COUNT: 16.9 % (ref 11.5–15)
GFR, ESTIMATED: >90 ML/MIN/1.73M2
GLUCOSE SERPL-MCNC: 100 MG/DL (ref 74–99)
HCT VFR BLD AUTO: 27.5 % (ref 34–48)
HGB BLD-MCNC: 8.3 G/DL (ref 11.5–15.5)
IMM GRANULOCYTES # BLD AUTO: 0.06 K/UL (ref 0–0.58)
IMM GRANULOCYTES NFR BLD: 1 % (ref 0–5)
LYMPHOCYTES NFR BLD: 1.62 K/UL (ref 1.5–4)
LYMPHOCYTES RELATIVE PERCENT: 20 % (ref 20–42)
MCH RBC QN AUTO: 26.8 PG (ref 26–35)
MCHC RBC AUTO-ENTMCNC: 30.2 G/DL (ref 32–34.5)
MCV RBC AUTO: 88.7 FL (ref 80–99.9)
MICROORGANISM SPEC CULT: ABNORMAL
MICROORGANISM SPEC CULT: ABNORMAL
MICROORGANISM SPEC CULT: NORMAL
MICROORGANISM SPEC CULT: NORMAL
MICROORGANISM/AGENT SPEC: ABNORMAL
MONOCYTES NFR BLD: 0.82 K/UL (ref 0.1–0.95)
MONOCYTES NFR BLD: 10 % (ref 2–12)
NEUTROPHILS NFR BLD: 62 % (ref 43–80)
NEUTS SEG NFR BLD: 4.99 K/UL (ref 1.8–7.3)
PLATELET # BLD AUTO: 654 K/UL (ref 130–450)
PMV BLD AUTO: 8.4 FL (ref 7–12)
POTASSIUM SERPL-SCNC: 3.6 MMOL/L (ref 3.5–5)
PROT SERPL-MCNC: 6.4 G/DL (ref 6.4–8.3)
RBC # BLD AUTO: 3.1 M/UL (ref 3.5–5.5)
SERVICE CMNT-IMP: ABNORMAL
SERVICE CMNT-IMP: NORMAL
SERVICE CMNT-IMP: NORMAL
SODIUM SERPL-SCNC: 134 MMOL/L (ref 132–146)
SPECIMEN DESCRIPTION: ABNORMAL
SPECIMEN DESCRIPTION: NORMAL
SPECIMEN DESCRIPTION: NORMAL
WBC OTHER # BLD: 8 K/UL (ref 4.5–11.5)

## 2025-01-24 PROCEDURE — 6360000002 HC RX W HCPCS: Performed by: STUDENT IN AN ORGANIZED HEALTH CARE EDUCATION/TRAINING PROGRAM

## 2025-01-24 PROCEDURE — 6360000002 HC RX W HCPCS: Performed by: CLINICAL NURSE SPECIALIST

## 2025-01-24 PROCEDURE — 2580000003 HC RX 258: Performed by: CLINICAL NURSE SPECIALIST

## 2025-01-24 PROCEDURE — 36415 COLL VENOUS BLD VENIPUNCTURE: CPT

## 2025-01-24 PROCEDURE — 80053 COMPREHEN METABOLIC PANEL: CPT

## 2025-01-24 PROCEDURE — 6370000000 HC RX 637 (ALT 250 FOR IP): Performed by: STUDENT IN AN ORGANIZED HEALTH CARE EDUCATION/TRAINING PROGRAM

## 2025-01-24 PROCEDURE — 85025 COMPLETE CBC W/AUTO DIFF WBC: CPT

## 2025-01-24 PROCEDURE — 74177 CT ABD & PELVIS W/CONTRAST: CPT

## 2025-01-24 PROCEDURE — 6360000004 HC RX CONTRAST MEDICATION: Performed by: RADIOLOGY

## 2025-01-24 PROCEDURE — 6370000000 HC RX 637 (ALT 250 FOR IP): Performed by: SPECIALIST

## 2025-01-24 PROCEDURE — 2580000003 HC RX 258: Performed by: STUDENT IN AN ORGANIZED HEALTH CARE EDUCATION/TRAINING PROGRAM

## 2025-01-24 RX ORDER — METHOCARBAMOL 750 MG/1
750 TABLET, FILM COATED ORAL 4 TIMES DAILY
Qty: 40 TABLET | Refills: 0 | Status: SHIPPED | OUTPATIENT
Start: 2025-01-24 | End: 2025-02-03

## 2025-01-24 RX ORDER — DOCUSATE SODIUM 100 MG/1
100 CAPSULE, LIQUID FILLED ORAL 2 TIMES DAILY
Qty: 30 CAPSULE | Refills: 0 | Status: SHIPPED | OUTPATIENT
Start: 2025-01-24 | End: 2025-02-08

## 2025-01-24 RX ORDER — IOPAMIDOL 755 MG/ML
75 INJECTION, SOLUTION INTRAVASCULAR
Status: COMPLETED | OUTPATIENT
Start: 2025-01-24 | End: 2025-01-24

## 2025-01-24 RX ORDER — OXYCODONE AND ACETAMINOPHEN 5; 325 MG/1; MG/1
1 TABLET ORAL EVERY 6 HOURS PRN
Qty: 15 TABLET | Refills: 0 | Status: SHIPPED | OUTPATIENT
Start: 2025-01-24 | End: 2025-01-29

## 2025-01-24 RX ORDER — LIDOCAINE HYDROCHLORIDE 10 MG/ML
50 INJECTION, SOLUTION INFILTRATION; PERINEURAL ONCE
Status: DISCONTINUED | OUTPATIENT
Start: 2025-01-24 | End: 2025-01-24 | Stop reason: HOSPADM

## 2025-01-24 RX ORDER — IOPAMIDOL 755 MG/ML
18 INJECTION, SOLUTION INTRAVASCULAR
Status: COMPLETED | OUTPATIENT
Start: 2025-01-24 | End: 2025-01-24

## 2025-01-24 RX ORDER — SODIUM CHLORIDE 9 MG/ML
INJECTION, SOLUTION INTRAVENOUS PRN
Status: DISCONTINUED | OUTPATIENT
Start: 2025-01-24 | End: 2025-01-24 | Stop reason: HOSPADM

## 2025-01-24 RX ORDER — SODIUM CHLORIDE 0.9 % (FLUSH) 0.9 %
5-40 SYRINGE (ML) INJECTION EVERY 12 HOURS SCHEDULED
Status: DISCONTINUED | OUTPATIENT
Start: 2025-01-24 | End: 2025-01-24 | Stop reason: HOSPADM

## 2025-01-24 RX ORDER — SODIUM CHLORIDE 0.9 % (FLUSH) 0.9 %
5-40 SYRINGE (ML) INJECTION PRN
Status: DISCONTINUED | OUTPATIENT
Start: 2025-01-24 | End: 2025-01-24 | Stop reason: HOSPADM

## 2025-01-24 RX ADMIN — KETOROLAC TROMETHAMINE 30 MG: 30 INJECTION, SOLUTION INTRAMUSCULAR at 10:47

## 2025-01-24 RX ADMIN — FLUCONAZOLE 400 MG: 100 TABLET ORAL at 09:23

## 2025-01-24 RX ADMIN — DOCUSATE SODIUM 100 MG: 100 CAPSULE, LIQUID FILLED ORAL at 09:23

## 2025-01-24 RX ADMIN — SODIUM CHLORIDE 40 MG: 9 INJECTION INTRAMUSCULAR; INTRAVENOUS; SUBCUTANEOUS at 09:24

## 2025-01-24 RX ADMIN — METHOCARBAMOL TABLETS 750 MG: 500 TABLET, COATED ORAL at 09:23

## 2025-01-24 RX ADMIN — SODIUM CHLORIDE 400 MG: 9 INJECTION INTRAMUSCULAR; INTRAVENOUS; SUBCUTANEOUS at 14:38

## 2025-01-24 RX ADMIN — OXYCODONE 10 MG: 5 TABLET ORAL at 04:02

## 2025-01-24 RX ADMIN — KETOROLAC TROMETHAMINE 30 MG: 30 INJECTION, SOLUTION INTRAMUSCULAR at 03:57

## 2025-01-24 RX ADMIN — METHOCARBAMOL TABLETS 750 MG: 500 TABLET, COATED ORAL at 14:06

## 2025-01-24 RX ADMIN — LINEZOLID 600 MG: 600 TABLET, FILM COATED ORAL at 09:23

## 2025-01-24 RX ADMIN — LEVOTHYROXINE SODIUM 88 MCG: 0.09 TABLET ORAL at 07:23

## 2025-01-24 RX ADMIN — IOPAMIDOL 75 ML: 755 INJECTION, SOLUTION INTRAVENOUS at 11:52

## 2025-01-24 RX ADMIN — ENOXAPARIN SODIUM 40 MG: 100 INJECTION SUBCUTANEOUS at 09:23

## 2025-01-24 RX ADMIN — METOCLOPRAMIDE HYDROCHLORIDE 10 MG: 5 INJECTION, SOLUTION INTRAMUSCULAR; INTRAVENOUS at 09:24

## 2025-01-24 RX ADMIN — METOCLOPRAMIDE HYDROCHLORIDE 10 MG: 5 INJECTION, SOLUTION INTRAMUSCULAR; INTRAVENOUS at 03:57

## 2025-01-24 RX ADMIN — IOPAMIDOL 18 ML: 755 INJECTION, SOLUTION INTRAVENOUS at 11:51

## 2025-01-24 RX ADMIN — SULFAMETHOXAZOLE AND TRIMETHOPRIM 1 TABLET: 800; 160 TABLET ORAL at 09:23

## 2025-01-24 ASSESSMENT — PAIN SCALES - GENERAL
PAINLEVEL_OUTOF10: 4
PAINLEVEL_OUTOF10: 8

## 2025-01-24 ASSESSMENT — PAIN DESCRIPTION - LOCATION: LOCATION: ABDOMEN

## 2025-01-24 ASSESSMENT — PAIN DESCRIPTION - DESCRIPTORS: DESCRIPTORS: ACHING;DULL;JABBING

## 2025-01-24 NOTE — PROGRESS NOTES
GENERAL SURGERY  DAILY PROGRESS NOTE    Patient's Name/Date of Birth: Velma Mazariegos / 1965    Date: 2025     Chief Complaint   Patient presents with    Abdominal Pain     Pt comes to the ED from [facility] with c/o post op abdominal pain with nausea and vomiting. Pt had recent bowel resection surgery on 25 and had colostomy placed.         Subjective:  No acute issues overnight.  Tolerating diet.  Resting comfortably.  Notes from infectious disease were reviewed, patient will probably be going home on IV antibiotic    Objective:  Last 24Hrs  Temp  Av.6 °F (37 °C)  Min: 98.5 °F (36.9 °C)  Max: 98.6 °F (37 °C)  Resp  Av  Min: 16  Max: 18  Pulse  Av  Min: 90  Max: 90  Systolic (24hrs), Av , Min:131 , Max:139     Diastolic (24hrs), Av, Min:67, Max:82    SpO2  Av.5 %  Min: 94 %  Max: 95 %    I/O last 3 completed shifts:  In: 720 [P.O.:720]  Out: -       General: In no acute distress, alert and oriented x4  Cardiovascular: Warm throughout, no edema  Respiratory: no respiratory distress, equal chest rise  Abdomen: soft, obese, abdomen wound open with retention sutures and packed with gauze, covered with dressings and tape, no strikethrough. LLQ ostomy w/ soft brown stool, some areas of mucosal sloughing but remains functional, non-distended  Skin: no obvious rashes or lesions appreciated, no jaundice  Extremities: atraumatic, no focal motor deficits, no open wounds      CBC  Recent Labs     25  0442 25  0349 25  0349   WBC 7.6 8.0 8.0   RBC 3.11* 3.04* 3.10*   HGB 8.6* 8.4* 8.3*   HCT 27.7* 27.4* 27.5*   MCV 89.1 90.1 88.7   MCH 27.7 27.6 26.8   MCHC 31.0* 30.7* 30.2*   RDW 17.2* 16.8* 16.9*   * 673* 654*   MPV 8.4 8.4 8.4       CMP  Recent Labs     25  0442 25  0349 25  034    137 134   K 3.4* 3.6 3.6    103 100   CO2 24 23 24   BUN 11 9 11   CREATININE 0.6 0.6 0.6   GLUCOSE 111* 133* 100*   CALCIUM 7.9* 8.0*

## 2025-01-24 NOTE — FLOWSHEET NOTE
Inpatient Wound Care    Admit Date: 1/18/2025  7:49 PM    Reason for consult:  abdomen    Significant history:    Past Medical History:   Diagnosis Date    Arthritis     Asthma     Diabetes mellitus (HCC)     Sleep apnea     cpap   #10    Thyroid disease          Findings:     01/24/25 1531   Wound 01/07/25 Abdomen Mid   Date First Assessed/Time First Assessed: 01/07/25 1529   Primary Wound Type: Surgical Type  Location: Abdomen  Wound Location Orientation: Mid   Wound Etiology Surgical   Dressing/Treatment Packing   Drainage Amount Small (< 25%)   Drainage Description Serosanguinous   Odor None         Impression:  surgical wound    Interventions in place:  moist packing    Plan:  Cont packing daily  Pt to have homecare      Kaylynn Mendes RN 1/24/2025 3:33 PM

## 2025-01-24 NOTE — CARE COORDINATION
CM note; Confirmed with [facility] that they can see patient for start of care tomorrow.  Pt to receive her antibiotics today and then can discharge home.  Pt aware of and in agreement with plan.  PICC line in place.  NURIA faxed signed script and home pharmacy consent yesterday to [facility] home infusion pharmacy.  [facility] orders written, IV antibiotic therapy/PICC  added.

## 2025-01-24 NOTE — PROGRESS NOTES
Internal Medicine Progress Note     [facility]     Paul Avery D.O., RICHOMarkellI.                         Brant Tian D.O., AB Castrejon D.O.     Mili Hays, MSN, APRN, NP-C  Filemon Rosales, MSN, APRN-CNP  Remington Forte, MSN, APRN, NP-C  Christin Mary, MSN, APRN-CNP  Xochitl Bach, MSN, APRN, NP-C     Primary Care Physician: Niall Oconnor MD   Admitting Physician:  No admitting provider for patient encounter.  Admission date and time: 1/18/2025  7:49 PM    Room:  [address] [place] [address]  Admitting diagnosis: Peritonitis (HCC) [K65.9]  Fever, unspecified fever cause [R50.9]  Leak of anastomosis between gastrointestinal structures [K91.89]    Patient Name: Velma Mazariegos  MRN: [id number]    Date of Service: 1/24/2025     Subjective:  Velma is a 59 y.o. female who was seen and examined today,1/24/2025, at the bedside.  Patient sitting up in chair.  Patient is up for discharge today patient had adequate bowel function.  Arrangement has been outpatient for antibiotic therapy patient denies chest pain or shortness of breath..  Frustrated with ongoing problem    No family member present      Review of System:   Constitutional:   Denies fever or chills, weight loss or gain, fatigue or malaise.  HEENT:   Denies ear pain, sore throat, sinus or eye problems.  Cardiovascular:   Denies any chest pain, irregular heartbeats, or palpitations.   Respiratory:   Denies shortness of breath, coughing, sputum production, hemoptysis, or wheezing.  Gastrointestinal:   No significant pain.  tolerating a diet.  Having adequate ostomy output.  Genitourinary:    Denies any urgency, frequency, hematuria. Voiding  without difficulty.  Extremities:   Denies lower extremity swelling, edema or cyanosis.   Neurology:    Denies any headache or focal neurological deficits, improving weakness and deconditioning.  Psch:   Denies being anxious or depressed.  Musculoskeletal:    Diffuse muscle aches and

## 2025-01-24 NOTE — DISCHARGE SUMMARY
Physician Discharge Summary     Patient ID:  Velma Mazariegos  [id number]  59 y.o.  1965    Admit date: 1/18/2025    Discharge date and time: 1/24/2025 @ 1600    Admitting Physician: No admitting provider for patient encounter.     Admission Diagnoses: Peritonitis (HCC) [K65.9]  Fever, unspecified fever cause [R50.9]  Leak of anastomosis between gastrointestinal structures [K91.89]    Discharge Diagnoses: Principal Problem:    Diverticulitis with perforation and abscess  Active Problems:    Leak of anastomosis between gastrointestinal structures  Resolved Problems:    * No resolved hospital problems. *      Admission Condition: fair    Discharged Condition: stable      Hospital Course:  Velma Mazariegos is a 59 y.o. female who presented with abdominal pain. Work up revealed postoperative EUSEIBA and postoperative pain. The patient's course was otherwise uneventful. She progressed well, pain was controlled on PO medications. She was tolerating a regular diet with no nausea or vomiting, and was in a suitable condition for discharge to  in stable condition.      Consults:   IP CONSULT TO HOSPITALIST  IP CONSULT TO INFECTIOUS DISEASES  IP CONSULT TO DIETITIAN  IP CONSULT TO SOCIAL WORK  IP CONSULT TO SOCIAL WORK  IP CONSULT TO HOME CARE NEEDS  IP CONSULT TO PHARMACY  IP CONSULT TO VASCULAR ACCESS TEAM  IP CONSULT TO HOME CARE NEEDS    Significant Diagnostic Studies:   No results found.    Discharge Exam:  General: In no acute distress, alert and oriented x4  Cardiovascular: Warm throughout, no edema  Respiratory: no respiratory distress, equal chest rise  Abdomen: soft, obese, abdomen wound open with retention sutures and packed with gauze, covered with dressings and tape, no strikethrough. LLQ ostomy w/ soft brown stool, some areas of mucosal sloughing but remains functional, non-distended  Skin: no obvious rashes or lesions appreciated, no jaundice  Extremities: atraumatic, no focal motor deficits, no open wounds

## 2025-01-24 NOTE — PROGRESS NOTES
ET Nurse  Admit Date: 1/18/2025  7:49 PM    Reason for consult:  colostomy    Significant history:    Past Medical History:   Diagnosis Date    Arthritis     Asthma     Diabetes mellitus (HCC)     Sleep apnea     cpap   #10    Thyroid disease        Stoma assessment:  stoma  has necrotic tissue  Can see red mucosa    Stoma separation      Plan:  cont with one piece system coloplast #[id number]  Gave supplies and inst for discharge    Kaylynn Mendes RN 1/24/2025 3:34 PM

## 2025-01-24 NOTE — CARE COORDINATION
CM note: Per ID, changed antibiotics to daptomycin instead of invanz,  New script faxed to [facility] infusion pharmacy and notified [facility] care.

## 2025-01-24 NOTE — PROGRESS NOTES
CLINICAL PHARMACY NOTE: MEDS TO BEDS    Total # of Prescriptions Filled: 1   The following medications were delivered to the patient:    Bactrim 800/160 mg    Additional Documentation:  Linzolid discontinued by dr. Hull

## 2025-01-24 NOTE — PLAN OF CARE
Problem: Chronic Conditions and Co-morbidities  Goal: Patient's chronic conditions and co-morbidity symptoms are monitored and maintained or improved  1/24/2025 1121 by Lucero Ang RN  Outcome: Progressing  1/23/2025 2304 by Rahul Silva RN  Outcome: Progressing     Problem: Discharge Planning  Goal: Discharge to home or other facility with appropriate resources  1/24/2025 1121 by Lucero Ang RN  Outcome: Progressing  1/23/2025 2304 by Rahul Silva RN  Outcome: Progressing     Problem: Pain  Goal: Verbalizes/displays adequate comfort level or baseline comfort level  1/24/2025 1121 by Lucero Ang RN  Outcome: Progressing  1/23/2025 2304 by Rahul Silva RN  Outcome: Progressing     Problem: Safety - Adult  Goal: Free from fall injury  1/24/2025 1121 by Lucero Ang RN  Outcome: Progressing  1/23/2025 2304 by Rahul Silva RN  Outcome: Progressing     Problem: ABCDS Injury Assessment  Goal: Absence of physical injury  1/24/2025 1121 by Lucero Ang RN  Outcome: Progressing  1/23/2025 2304 by Rahul Silva RN  Outcome: Progressing     Problem: Skin/Tissue Integrity  Goal: Absence of new skin breakdown  Description: 1.  Monitor for areas of redness and/or skin breakdown  2.  Assess vascular access sites hourly  3.  Every 4-6 hours minimum:  Change oxygen saturation probe site  4.  Every 4-6 hours:  If on nasal continuous positive airway pressure, respiratory therapy assess nares and determine need for appliance change or resting period.  1/24/2025 1121 by Lucero Ang RN  Outcome: Progressing  1/23/2025 2304 by Rahul Silva RN  Outcome: Progressing     Problem: Nutrition Deficit:  Goal: Optimize nutritional status  1/24/2025 1121 by Lucero Ang RN  Outcome: Progressing  1/23/2025 2304 by Rahul Silva RN  Outcome: Progressing

## 2025-01-24 NOTE — PROGRESS NOTES
NAME: Velma Mazariegos  MR:  [id number]  :   1965  Admit Date:  2025    Elements of this note, were copied and pasted from Previous. Updates have been made where noted and reflect current exam and medical decision making from the DOS of this encounter.  CHIEF COMPLAINT       Chief Complaint   Patient presents with    Abdominal Pain     Pt comes to the ED from [facility] with c/o post op abdominal pain with nausea and vomiting. Pt had recent bowel resection surgery on 25 and had colostomy placed.      HISTORY OF PRESENT ILLNESS     Velma Mazariegos is a 59 y.o. female admitted on 2025 and has  has a past medical history of Arthritis, Asthma, Diabetes mellitus (HCC), Sleep apnea, and Thyroid disease.     This is a face to face encounter   25- patient is up in chair- has been afebrile.- on room air. Reports abdominal cramping- feels like she needs to move her bowels- reports she is having little outpt from ostomy.   2025-in chair upset about being here    patient in bed- frustrated - wants to go home. On room air. Still with abdominal pain.   2025- in bed has no c/o f/c on o2 denies uti sx    Patient is tolerating medications. No reported adverse drug reactions.  Available labs, imaging studies, microbiologic studies have been reviewed with pt and family if present.    Assessment & Plan     Admitted for   Peritonitis (HCC) [K65.9]  Fever, unspecified fever cause [R50.9]  Leak of anastomosis between gastrointestinal structures [K91.89]    ID following for   Fevers - improved   Pelvic abscess VRE Candida  Diverticular disease  H/o E coli/Strep 2024  Can not r/o peritonitis    E coli bacteruria - rule out UTI   PSORIASIS    Plan:  Discussed with Dr. Hull   Continue bactrim   micro to work up A baumanni- sensitive to bactrim  stop linezolid- patient is on Cymbalta- watch for side effects - will change to Daptomycin   continue ertapenem   Continue diflucan   Check procal

## 2025-01-27 ENCOUNTER — OFFICE VISIT (OUTPATIENT)
Dept: SURGERY | Age: 60
End: 2025-01-27

## 2025-01-27 VITALS
RESPIRATION RATE: 18 BRPM | TEMPERATURE: 98.4 F | OXYGEN SATURATION: 98 % | DIASTOLIC BLOOD PRESSURE: 64 MMHG | HEIGHT: 62 IN | SYSTOLIC BLOOD PRESSURE: 118 MMHG | HEART RATE: 93 BPM | BODY MASS INDEX: 38.64 KG/M2 | WEIGHT: 210 LBS

## 2025-01-27 DIAGNOSIS — R18.8 PELVIC FLUID COLLECTION: Primary | ICD-10-CM

## 2025-01-27 PROCEDURE — 99024 POSTOP FOLLOW-UP VISIT: CPT | Performed by: SURGERY

## 2025-01-27 NOTE — PROGRESS NOTES
General Surgery Office Note  [facility]  Juan David Carranza MD, MS    Patient's Name/Date of Birth: Velma Mazariegos / 1965    Date: January 27, 2025     Surgeon: MD Dillon    Chief Complaint: s/p sigmoid resection    Patient Active Problem List   Diagnosis    Asthma exacerbation    Nicotine addiction    Left ankle sprain    Diverticulitis of colon    Abscess    Right lower quadrant abdominal pain    Colonic diverticular abscess    Decubitus ulcer of right buttock, stage 3 (HCC)    Diabetes mellitus type 2, noninsulin dependent (HCC)    Diverticulitis with perforation and abscess    Colocutaneous fistula    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)    Diverticulitis large intestine w/o perforation or abscess w/o bleeding    Leak of anastomosis between gastrointestinal structures       Subjective: improving, no nausea, states food still not appealing, stoma working    Objective:  /64 (Site: Left Upper Arm, Position: Sitting, Cuff Size: Medium Adult)   Pulse 93   Temp 98.4 °F (36.9 °C)   Resp 18   Ht 1.575 m (5' 2\")   Wt 95.3 kg (210 lb)   SpO2 98%   BMI 38.41 kg/m²   Labs:        General appearance: AA, NAD  HEENT: NCAT, PERRLA, EOMI  Lungs: Clear, equal rise bilateral  Heart: Reg  Abdomen: soft, nondistended, nontender, incisions well healed, no signs of infection, no cellulitis, no hematoma, retentions removed, stoma LLQ viable, retracted, stool and air in bag  Skin: No lesions, incisions well healed  Psych: No distress, conversive, no hallucinations  : No ulcers or lesions  Rectal: No bleeding    A complete 10 system review was performed and are otherwise negative unless mentioned in the above HPI. Specific negatives are listed below but may not include all those reviewed.    General ROS: negative obtundation, AMS  ENT ROS: negative rhinorrhea, epistaxis  Allergy and Immunology ROS: negative itchy/watery eyes or nasal

## 2025-01-27 NOTE — PROGRESS NOTES
Physician Progress Note      PATIENT:               MARSHALL FIERRO  [facility] #:                  [id number]  :                       1965  ADMIT DATE:       2025 7:49 PM  DISCH DATE:        2025 5:30 PM  RESPONDING  PROVIDER #:        Brant Tian DO          QUERY TEXT:    Patient admitted with Peritonitis.  Noted documentation of Acute Kidney Injury   in DS on  with Creatinine - 0.7, 0.8, 0.6.  In order to support the   diagnosis of EUSEBIA, please include additional clinical indicators in your   documentation.? Or please document if the diagnosis of EUSEBIA has been ruled out   after further study.    The medical record reflects the following:  Risk Factors: Peritonitis, DM    Clinical Indicators: H&P,  \"BUN and creatinine were 10 and 0.6   respectively\".    DS  \"Work up revealed postoperative EUSEBIA and postoperative pain\".    On  - Creatinine - 0.7, BUN - 10, GFR - >90  On  - Creatinine - 0.7, BUN - 9, GFR - >90  On  - Creatinine - 0.8, BUN - 10, GFR - 85  On  - Creatinine - 0.7, BUN - 11, GFR - >90  On  - Creatinine - 0.6, BUN - 11, GFR - >90  On  - Creatinine - 0.6, BUN - 9, GFR - >90  On  - Creatinine - 0.6, BUN - 11, GFR - >90    Treatment: Sodium chloride 0.9 % bolus 1,000 ml, Serial labs    Thank you  HUNTER Cardoso CDS      Defined by Kidney Disease Improving Global Outcomes (KDIGO) clinical practice   guideline for acute kidney injury:  -Increase in SCr by greater than or equal to 0.3 mg/dl within 48 hours; or  -Increase or decrease in SCr to greater than or equal to 1.5 times baseline,   which is known or presumed to have occurred within the prior 7 days; or  -Urine volume < 0.5ml/kg/h for 6 hours.  Options provided:  -- Acute kidney injury evidenced by, Please document evidence as well as a   numerical baseline creatinine, if known.  -- Acute kidney injury ruled out after study  -- Other - I will add my own diagnosis  -- Disagree - Not

## 2025-01-27 NOTE — PROGRESS NOTES
Physician Progress Note      PATIENT:               MARSHALL FIERRO  [facility] #:                  [id number]  :                       1965  ADMIT DATE:       2025 7:49 PM  DISCH DATE:        2025 5:30 PM  RESPONDING  PROVIDER #:        EDGAR DAWSON DO          QUERY TEXT:    Patient admitted with Peritonitis.  Noted documentation of Acute Kidney Injury   in DS on  with Creatinine - 0.7, 0.8, 0.6.  In order to support the   diagnosis of EUSEBIA, please include additional clinical indicators in your   documentation.? Or please document if the diagnosis of EUSEBIA has been ruled out   after further study.    The medical record reflects the following:  Risk Factors: Peritonitis, DM    Clinical Indicators: H&P,  \"BUN and creatinine were 10 and 0.6   respectively\".    DS  \"Work up revealed postoperative EUSEBIA and postoperative pain\".    On  - Creatinine - 0.7, BUN - 10, GFR - >90  On  - Creatinine - 0.7, BUN - 9, GFR - >90  On  - Creatinine - 0.8, BUN - 10, GFR - 85  On  - Creatinine - 0.7, BUN - 11, GFR - >90  On  - Creatinine - 0.6, BUN - 11, GFR - >90  On  - Creatinine - 0.6, BUN - 9, GFR - >90  On  - Creatinine - 0.6, BUN - 11, GFR - >90    Treatment: Sodium chloride 0.9 % bolus 1,000 ml, Serial labs    Thank you  HUNTER Cardoso CDS      Defined by Kidney Disease Improving Global Outcomes (KDIGO) clinical practice   guideline for acute kidney injury:  -Increase in SCr by greater than or equal to 0.3 mg/dl within 48 hours; or  -Increase or decrease in SCr to greater than or equal to 1.5 times baseline,   which is known or presumed to have occurred within the prior 7 days; or  -Urine volume < 0.5ml/kg/h for 6 hours.  Options provided:  -- Acute kidney injury evidenced by, Please document evidence as well as a   numerical baseline creatinine, if known.  -- Acute kidney injury ruled out after study  -- Other - I will add my own diagnosis  -- Disagree -

## 2025-01-27 NOTE — PROGRESS NOTES
CLINICAL PHARMACY NOTE: MEDS TO BEDS    Total # of Prescriptions Filled: 2   The following medications were delivered to the patient:  Percocet 5-325 mg  Methocarbamol 750 mg    Additional Documentation:

## 2025-01-29 LAB
SEND OUT REPORT: NORMAL
TEST NAME: NORMAL

## 2025-01-31 NOTE — PROGRESS NOTES
Physician Progress Note      PATIENT:               MARSHALL FIERRO  CSN #:                  [id number]  :                       1965  ADMIT DATE:       2025 7:49 PM  DISCH DATE:        2025 5:30 PM  RESPONDING  PROVIDER #:        EDGAR DAWSON DO          QUERY TEXT:    Patient admitted with Intra-abdominal abscess. Documentation reflects Can not   r/o peritonitis in Infectious Disease Consult note dated .  If possible,   please document in the progress notes and discharge summary if peritonitis   was:    The medical record reflects the following:  Risk Factors:  s/p end colostomy creation, 59 year old    Clinical Indicators: Infectious Disease Consult  \"Pt had recent bowel   resection surgery on 25 and had colostomy placed; c/o post op abdominal   pain with nausea and vomiting. Pelvic abscess VRE Candida,  Can not r/o   peritonitis  add GN coverage\".    Internal Medicine PN  \" Colon anastomotic leak with feculent peritonitis   and intra-abdominal abscess formation status post diagnostic laparoscopy   converted to exploratory laparotomy, partial colon resection, drainage of   pelvic abscess, and end colostomy creation 2025\".    DS  \"Peritonitis, Diverticulitis with perforation and abscess\".    Treatment: IV vancomycin, ID Consult, IVF    Thank you  HUNTER Cardoso CDS  Options provided:  -- Peritonitis confirmed after study  -- Peritonitis ruled out after study  -- Other - I will add my own diagnosis  -- Disagree - Not applicable / Not valid  -- Disagree - Clinically unable to determine / Unknown  -- Refer to Clinical Documentation Reviewer    PROVIDER RESPONSE TEXT:    Peritonitis ruled out after study.    Query created by: Kiel Bailey on 2025 8:40 AM      Electronically signed by:  EDGAR DAWSON DO 2025 5:17 AM

## 2025-02-11 ENCOUNTER — CLINICAL DOCUMENTATION (OUTPATIENT)
Dept: INFECTIOUS DISEASES | Age: 60
End: 2025-02-11

## 2025-02-11 NOTE — PROGRESS NOTES
Physician Progress Note      PATIENT:               MARSHALL FIERRO  [facility] #:                  [id number]  :                       1965  ADMIT DATE:       2025 7:49 PM  DISCH DATE:        2025 5:30 PM  RESPONDING  PROVIDER #:        Juan David Carranza MD          QUERY TEXT:    Pt admitted with abdominal pain. Pt noted to have postoperative pain, Pelvic   abscess. If possible, please document in progress notes and discharge summary   if you are evaluating and /or treating any of the following:    The medical record reflects the following:  Risk Factors: s/p laparoscopic low anterior resection with low pelvic   anastomosis    Clinical Indicators:  CT Abd  \"Small volume intraperitoneal fluid with   mild peritoneal thickening/enhancement\".    GS PN  \" ED with worsening abdominal pain, nausea, and vomiting, imaging   significant for post surgical changes although mild peritoneal   thickening/enhancement; Some fluid on CT but no drainable abscess, Subcut   emphysema on CT wo cellulitis or signs of abdominal wall infection, Will   remove retentions, Adjust her pain regimen as state at the facility they only   provided narcotics q6 and did no adhere to postop pain regimen we prescribed   with robaxin and Tylenol routine\"    ID PN  \"Pelvic abscess VRE Candida, Diverticular disease, Check procal   rvp, fluconazole    ID PN  \" patient is up in chair- has been afebrile.- on room air. Reports   abdominal cramping- feels like she needs to move her bowels- reports she is   having little outpt from ostomy.  Pelvic abscess VRE Candida, Diverticular   disease\".    DS  \"Work up revealed postoperative EUSEBIA and postoperative pain. The   patient's course was otherwise uneventful. She progressed well, pain was   controlled on PO medications\".    Treatment:  IV Zosyn, IV Vancomycin, IV fentanyl, Acetaminophen, CT Abd, ID   consult, Genral surgery consut    Thank you  HUNTER Cardoso CDS  Options

## 2025-02-12 ENCOUNTER — SCHEDULED TELEPHONE ENCOUNTER (OUTPATIENT)
Dept: SURGERY | Age: 60
End: 2025-02-12

## 2025-02-12 DIAGNOSIS — K91.89 LEAK OF ANASTOMOSIS BETWEEN GASTROINTESTINAL STRUCTURES: Primary | ICD-10-CM

## 2025-02-12 NOTE — PROGRESS NOTES
Telephone follow up for CT. She feels better. CT improving  Follow up in April discuss reversal    IMPRESSION:  Fluid collection along the superior margin of the rectosigmoid pouch has  decreased in size, as above.     Distal colectomy with left abdominal ileostomy.  There is colonic  diverticulosis, without evidence of diverticulitis.     No new abdominal/pelvic fluid collection is identified.     Left abdominal/pelvic deep subcutaneous emphysema has improved, without  resolution.    Juan David Carranza MD, MS, FACS, [facility]  General and Bariatric Surgery  [phone] (p)  2/12/2025  11:33 AM

## 2025-04-07 ENCOUNTER — OFFICE VISIT (OUTPATIENT)
Dept: SURGERY | Age: 60
End: 2025-04-07

## 2025-04-07 VITALS
DIASTOLIC BLOOD PRESSURE: 72 MMHG | SYSTOLIC BLOOD PRESSURE: 133 MMHG | HEIGHT: 62 IN | BODY MASS INDEX: 38.64 KG/M2 | HEART RATE: 81 BPM | WEIGHT: 210 LBS | TEMPERATURE: 97.5 F | RESPIRATION RATE: 18 BRPM

## 2025-04-07 DIAGNOSIS — Z93.3 COLOSTOMY IN PLACE (HCC): Primary | ICD-10-CM

## 2025-04-07 RX ORDER — PREDNISONE 10 MG/1
10 TABLET ORAL DAILY
COMMUNITY

## 2025-04-07 NOTE — PROGRESS NOTES
[FreeTextEntry1] : 44 yo F with microhematuria\par \par - No labs available for review today\par - UA, culture\par - Reviewed US through [facility] portal and confirmed findings as stated above\par - Discussed possible etiologies for microhematuria including benign (UTI, nephrolithiasis, cyst, BPH) vs malignancy (renal, ureteral and bladder). Discussed hematuria workup which includes upper tract imaging such as US or CT urogram and cystoscopy. Discussed risk and benefits of cystoscopy.\par - Once microhematuria confirmed, will plan to proceed with cysto
fistula  Patient Active Problem List   Diagnosis    Asthma exacerbation    Nicotine addiction    Left ankle sprain    Diverticulitis of colon    Abscess    Right lower quadrant abdominal pain    Colonic diverticular abscess    Decubitus ulcer of right buttock, stage 3 (HCC)    Diabetes mellitus type 2, noninsulin dependent (HCC)    Diverticulitis with perforation and abscess    Colocutaneous fistula    Non-pressure chronic ulcer of 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    Diverticulitis large intestine w/o perforation or abscess w/o bleeding    Leak of anastomosis between gastrointestinal structures         Plan:  Return in Aug at her request to discuss reversal- she needs to resume work and save some Monday before the reversal  CT abd/pel around reversal for operative planning        NOTE: This report, in part or full, may have been transcribed using voice recognition software. Every effort was made to ensure accuracy; however, inadvertent computerized transcription errors may be present. Please excuse any transcriptional grammatical or spelling errors that may have escaped my editorial review.    Juan David Carranza MD  1:35 PM  4/7/2025

## 2025-06-02 ENCOUNTER — HOSPITAL ENCOUNTER (OUTPATIENT)
Age: 60
Discharge: HOME | End: 2025-06-02
Payer: COMMERCIAL

## 2025-06-02 LAB
ALT SERPL-CCNC: 14 U/L (ref 0–32)
BASOPHILS # BLD: 0.07 K/UL (ref 0–0.2)
BASOPHILS NFR BLD: 1 % (ref 0–2)
EOSINOPHIL # BLD: 0.52 K/UL (ref 0.05–0.5)
EOSINOPHILS RELATIVE PERCENT: 6 % (ref 0–6)
ERYTHROCYTE [DISTWIDTH] IN BLOOD BY AUTOMATED COUNT: 17.2 % (ref 11.5–15)
GGT SERPL-CCNC: 18 U/L (ref 5–36)
HCT VFR BLD AUTO: 44.3 % (ref 34–48)
HGB BLD-MCNC: 13.4 G/DL (ref 11.5–15.5)
IMM GRANULOCYTES # BLD AUTO: <0.03 K/UL (ref 0–0.58)
IMM GRANULOCYTES NFR BLD: 0 % (ref 0–5)
LYMPHOCYTES NFR BLD: 2.4 K/UL (ref 1.5–4)
LYMPHOCYTES RELATIVE PERCENT: 27 % (ref 20–42)
MCH RBC QN AUTO: 23.9 PG (ref 26–35)
MCHC RBC AUTO-ENTMCNC: 30.2 G/DL (ref 32–34.5)
MCV RBC AUTO: 79.1 FL (ref 80–99.9)
MONOCYTES NFR BLD: 0.59 K/UL (ref 0.1–0.95)
MONOCYTES NFR BLD: 7 % (ref 2–12)
NEUTROPHILS NFR BLD: 59 % (ref 43–80)
NEUTS SEG NFR BLD: 5.26 K/UL (ref 1.8–7.3)
PLATELET # BLD AUTO: 453 K/UL (ref 130–450)
PMV BLD AUTO: 8.9 FL (ref 7–12)
RBC # BLD AUTO: 5.6 M/UL (ref 3.5–5.5)
WBC OTHER # BLD: 8.9 K/UL (ref 4.5–11.5)

## 2025-06-02 PROCEDURE — 36415 COLL VENOUS BLD VENIPUNCTURE: CPT

## 2025-06-02 PROCEDURE — 85025 COMPLETE CBC W/AUTO DIFF WBC: CPT

## 2025-06-02 PROCEDURE — 84460 ALANINE AMINO (ALT) (SGPT): CPT

## 2025-06-02 PROCEDURE — 86481 TB AG RESPONSE T-CELL SUSP: CPT

## 2025-06-02 PROCEDURE — 82977 ASSAY OF GGT: CPT

## 2025-06-05 LAB — T SPOT TB TEST: NORMAL

## 2025-07-25 ENCOUNTER — HOSPITAL ENCOUNTER (OUTPATIENT)
Age: 60
Discharge: HOME | End: 2025-07-25
Payer: COMMERCIAL

## 2025-07-25 LAB
ALT SERPL-CCNC: 16 U/L (ref 0–35)
BASOPHILS # BLD: 0.12 K/UL (ref 0–0.2)
BASOPHILS NFR BLD: 1 % (ref 0–2)
EOSINOPHIL # BLD: 0.22 K/UL (ref 0.05–0.5)
EOSINOPHILS RELATIVE PERCENT: 2 % (ref 0–6)
ERYTHROCYTE [DISTWIDTH] IN BLOOD BY AUTOMATED COUNT: 18.6 % (ref 11.5–15)
GGT SERPL-CCNC: 23 U/L (ref 5–36)
HCT VFR BLD AUTO: 43 % (ref 34–48)
HGB BLD-MCNC: 13.2 G/DL (ref 11.5–15.5)
IMM GRANULOCYTES # BLD AUTO: 0.05 K/UL (ref 0–0.58)
IMM GRANULOCYTES NFR BLD: 1 % (ref 0–5)
LYMPHOCYTES NFR BLD: 3.34 K/UL (ref 1.5–4)
LYMPHOCYTES RELATIVE PERCENT: 35 % (ref 20–42)
MCH RBC QN AUTO: 25.6 PG (ref 26–35)
MCHC RBC AUTO-ENTMCNC: 30.7 G/DL (ref 32–34.5)
MCV RBC AUTO: 83.3 FL (ref 80–99.9)
MONOCYTES NFR BLD: 0.53 K/UL (ref 0.1–0.95)
MONOCYTES NFR BLD: 6 % (ref 2–12)
NEUTROPHILS NFR BLD: 55 % (ref 43–80)
NEUTS SEG NFR BLD: 5.21 K/UL (ref 1.8–7.3)
PLATELET # BLD AUTO: 370 K/UL (ref 130–450)
PMV BLD AUTO: 8.8 FL (ref 7–12)
RBC # BLD AUTO: 5.16 M/UL (ref 3.5–5.5)
WBC OTHER # BLD: 9.5 K/UL (ref 4.5–11.5)

## 2025-07-25 PROCEDURE — 85025 COMPLETE CBC W/AUTO DIFF WBC: CPT

## 2025-07-25 PROCEDURE — 84460 ALANINE AMINO (ALT) (SGPT): CPT

## 2025-07-25 PROCEDURE — 82977 ASSAY OF GGT: CPT

## 2025-07-25 PROCEDURE — 36415 COLL VENOUS BLD VENIPUNCTURE: CPT

## (undated) DEVICE — TROCAR: Brand: KII FIOS FIRST ENTRY

## (undated) DEVICE — TOWEL,OR,DSP,ST,BLUE,STD,6/PK,12PK/CS: Brand: MEDLINE

## (undated) DEVICE — SPONGE,LAP,12"X12",XR,ST,5/PK,40PK/CS: Brand: MEDLINE

## (undated) DEVICE — GOWN,SIRUS,NONRNF,SETINSLV,XL,20/CS: Brand: MEDLINE

## (undated) DEVICE — LIQUIBAND RAPID ADHESIVE 36/CS 0.8ML: Brand: MEDLINE

## (undated) DEVICE — GOWN,SIRUS,FABRNF,XL,20/CS: Brand: MEDLINE

## (undated) DEVICE — STAPLER 60 RELOAD GREEN: Brand: SUREFORM

## (undated) DEVICE — SUTURE BARB NON ABSORBABLE V LOC PBT BLU SZ 0 LEN 9 IN GS 21 VLOCN0346

## (undated) DEVICE — PLUMEPORT ACTIV LAPAROSCOPIC SMOKE FILTRATION DEVICE: Brand: PLUMEPORT ACTIVE

## (undated) DEVICE — AIRSEAL 8 MM CANNULA CAP AND OBTURATOR WITH BLADELESS OPTICAL TIP COMPATIBLE WITH INTUITIVE DA VINCI XI AND DA VINCI X 8 MM INSTRUMENT CANNULA, STANDARD LENGTH: Brand: AIRSEAL

## (undated) DEVICE — SYRINGE MED 10ML TRNSLUC BRL PLUNG BLK MRK POLYPR CTRL

## (undated) DEVICE — DRAIN CHN 19FR L0.25MM DIA6.3MM SIL RND HUBLESS FULL FLUT

## (undated) DEVICE — SKIN PREP TRAY 4 COMPARTM TRAY: Brand: MEDLINE INDUSTRIES, INC.

## (undated) DEVICE — COLOSTOMY/ILEOSTOMY KIT,FLEXWEAR: Brand: NEW IMAGE

## (undated) DEVICE — Device

## (undated) DEVICE — SHEET,DRAPE,70X100,STERILE: Brand: MEDLINE

## (undated) DEVICE — 1LYRTR 16FR10ML100%SIL UMS SNP: Brand: MEDLINE INDUSTRIES, INC.

## (undated) DEVICE — COLUMN DRAPE

## (undated) DEVICE — HYDROPHILIC COATED RED RUBBER URETHRAL CATHETER, SMOOTH ROUNDED TIP, 20 FR (6.7 MM): Brand: DOVER

## (undated) DEVICE — LAPAROSCOPIC ACCESS SYSTEM: Brand: ALEXIS LAPAROSCOPIC SYSTEM

## (undated) DEVICE — AIRSEAL BIFURCATED FILTERED TUBESET WITH ACTIVATED CHARCOAL FILTER: Brand: AIRSEAL

## (undated) DEVICE — SEALER ENDOSCP NANO COAT OPN DIV CRV L JAW LIGASURE IMPACT

## (undated) DEVICE — YANKAUER,BULB TIP,W/O VENT,RIGID,STERILE: Brand: MEDLINE

## (undated) DEVICE — INSUFFLATION NEEDLE TO ESTABLISH PNEUMOPERITONEUM.: Brand: INSUFFLATION NEEDLE

## (undated) DEVICE — 40586 ADVANCED TRENDELENBURG POSITIONING KIT: Brand: 40586 ADVANCED TRENDELENBURG POSITIONING KIT

## (undated) DEVICE — PAD,NON-ADHERENT,3X8,STERILE,LF,1/PK: Brand: MEDLINE

## (undated) DEVICE — SUCTION IRRIGATOR: Brand: ENDOWRIST

## (undated) DEVICE — RELOAD STPL L75MM OPN H3.8MM CLS 1.5MM WIRE DIA0.2MM REG

## (undated) DEVICE — STAPLER-OBTURATOR DAVINCI XI 12MM

## (undated) DEVICE — [HIGH FLOW INSUFFLATOR,  DO NOT USE IF PACKAGE IS DAMAGED,  KEEP DRY,  KEEP AWAY FROM SUNLIGHT,  PROTECT FROM HEAT AND RADIOACTIVE SOURCES.]: Brand: PNEUMOSURE

## (undated) DEVICE — LAPAROSCOPIC SCISSORS: Brand: EPIX LAPAROSCOPIC SCISSORS

## (undated) DEVICE — APPLICATOR MEDICATED 26 CC SOLUTION HI LT ORNG CHLORAPREP

## (undated) DEVICE — REDUCER: Brand: ENDOWRIST

## (undated) DEVICE — LAPAROSCOPIC WIRE L HK 45 CM

## (undated) DEVICE — STAPLER INT L75MM CUT LN L73MM STPL LN L77MM BLU B FRM 8

## (undated) DEVICE — DRAIN CHN 19FR L0.25IN DIA6.3MM SIL RND HUBLESS FULL FLUT

## (undated) DEVICE — ARM DRAPE

## (undated) DEVICE — SOLUTION IV 1000ML 0.9% SOD CHL PH 5 INJ USP VIAFLX PLAS

## (undated) DEVICE — TROCAR: Brand: KII SLEEVE

## (undated) DEVICE — AIRSEAL 5 MM ACCESS PORT AND LOW PROFILE OBTURATOR WITH BLADELESS OPTICAL TIP, 120 MM LENGTH: Brand: AIRSEAL

## (undated) DEVICE — CADIERE FORCEPS: Brand: ENDOWRIST

## (undated) DEVICE — SPONGE LAP W18XL18IN WHT COT 4 PLY FLD STRUNG RADPQ DISP ST 2 PER PACK

## (undated) DEVICE — YANKAUER,POOLE TIP,STERILE,50/CS: Brand: MEDLINE

## (undated) DEVICE — SYRINGE IRRIG 60ML SFT PLIABLE BLB EZ TO GRP 1 HND USE W/

## (undated) DEVICE — ELECTRODE PT RET AD L9FT HI MOIST COND ADH HYDRGEL CORDED

## (undated) DEVICE — MARKER,SKIN,WI/RULER AND LABELS: Brand: MEDLINE

## (undated) DEVICE — TUBING, SUCTION, 1/4" X 10', STRAIGHT: Brand: MEDLINE

## (undated) DEVICE — SHEET,DRAPE,40X58,STERILE: Brand: MEDLINE

## (undated) DEVICE — TOWEL,OR,DSP,ST,GREEN,DLX,XR,4/PK,20PK/C: Brand: MEDLINE

## (undated) DEVICE — BLADELESS OBTURATOR: Brand: WECK VISTA

## (undated) DEVICE — BLADE ES ELASTOMERIC COAT INSUL DURABLE BEND UPTO 90DEG

## (undated) DEVICE — STAPLER 60: Brand: SUREFORM

## (undated) DEVICE — SEAL

## (undated) DEVICE — VESSEL SEALER EXTEND: Brand: ENDOWRIST

## (undated) DEVICE — NEEDLE HYPO 25GA L1.5IN BLU POLYPR HUB S STL REG BVL STR

## (undated) DEVICE — SOLUTION IRRIG 1000ML 0.9% SOD CHL USP POUR PLAS BTL

## (undated) DEVICE — PUMP SUC IRR TBNG L10FT W/ HNDPC ASSEMB STRYKEFLOW 2

## (undated) DEVICE — GLOVE ORANGE PI 7 1/2   MSG9075

## (undated) DEVICE — NDL CNTR 40CT FM MAG: Brand: MEDLINE INDUSTRIES, INC.

## (undated) DEVICE — 4-PORT MANIFOLD: Brand: NEPTUNE 2

## (undated) DEVICE — KIT BEDSIDE REVITAL OX 500ML

## (undated) DEVICE — BASIC DOUBLE BASIN 2-LF: Brand: MEDLINE INDUSTRIES, INC.

## (undated) DEVICE — STAPLER INT L28CM DIA29MM CLS STPL H10-2.5MM OPN LEG L5.5MM

## (undated) DEVICE — COVER,LIGHT HANDLE,FLX,1/PK: Brand: MEDLINE INDUSTRIES, INC.

## (undated) DEVICE — WIPES SKIN CLOTH READYPREP 9 X 10.5 IN 2% CHLORHEX GLUCONATE CHG PREOP